# Patient Record
Sex: MALE | Race: WHITE | NOT HISPANIC OR LATINO | Employment: OTHER | URBAN - NONMETROPOLITAN AREA
[De-identification: names, ages, dates, MRNs, and addresses within clinical notes are randomized per-mention and may not be internally consistent; named-entity substitution may affect disease eponyms.]

---

## 2020-05-18 ENCOUNTER — HOSPITAL ENCOUNTER (INPATIENT)
Facility: HOSPITAL | Age: 50
LOS: 4 days | Discharge: HOME | DRG: 872 | End: 2020-05-22
Attending: EMERGENCY MEDICINE | Admitting: FAMILY MEDICINE
Payer: COMMERCIAL

## 2020-05-18 ENCOUNTER — APPOINTMENT (EMERGENCY)
Dept: RADIOLOGY | Facility: HOSPITAL | Age: 50
DRG: 872 | End: 2020-05-18
Payer: COMMERCIAL

## 2020-05-18 DIAGNOSIS — I10 ESSENTIAL HYPERTENSION: ICD-10-CM

## 2020-05-18 DIAGNOSIS — L03.116 CELLULITIS OF LEFT LOWER EXTREMITY: ICD-10-CM

## 2020-05-18 DIAGNOSIS — L03.116 CELLULITIS OF LEFT ANTERIOR LOWER LEG: Primary | ICD-10-CM

## 2020-05-18 DIAGNOSIS — N17.9 AKI (ACUTE KIDNEY INJURY) (HCC): ICD-10-CM

## 2020-05-18 DIAGNOSIS — E87.6 HYPOKALEMIA: ICD-10-CM

## 2020-05-18 PROBLEM — R93.89 ABNORMAL FINDING ON CHEST XRAY: Status: ACTIVE | Noted: 2020-05-18

## 2020-05-18 PROBLEM — R79.89 ELEVATED SERUM CREATININE: Status: ACTIVE | Noted: 2020-05-18

## 2020-05-18 PROBLEM — R50.9 FEVER IN ADULT: Status: ACTIVE | Noted: 2020-05-18

## 2020-05-18 LAB
ANION GAP SERPL CALCULATED.3IONS-SCNC: 9 MMOL/L (ref 4–13)
BASOPHILS # BLD MANUAL: 0 THOUSAND/UL (ref 0–0.1)
BASOPHILS NFR MAR MANUAL: 0 % (ref 0–1)
BUN SERPL-MCNC: 24 MG/DL (ref 5–25)
CALCIUM SERPL-MCNC: 8.6 MG/DL (ref 8.3–10.1)
CHLORIDE SERPL-SCNC: 100 MMOL/L (ref 100–108)
CO2 SERPL-SCNC: 28 MMOL/L (ref 21–32)
CREAT SERPL-MCNC: 1.63 MG/DL (ref 0.6–1.3)
EOSINOPHIL # BLD MANUAL: 0 THOUSAND/UL (ref 0–0.4)
EOSINOPHIL NFR BLD MANUAL: 0 % (ref 0–6)
ERYTHROCYTE [DISTWIDTH] IN BLOOD BY AUTOMATED COUNT: 12.5 % (ref 11.6–15.1)
GFR SERPL CREATININE-BSD FRML MDRD: 49 ML/MIN/1.73SQ M
GLUCOSE SERPL-MCNC: 152 MG/DL (ref 65–140)
HCT VFR BLD AUTO: 41.6 % (ref 36.5–49.3)
HGB BLD-MCNC: 14.9 G/DL (ref 12–17)
LACTATE SERPL-SCNC: 1.2 MMOL/L (ref 0.5–2)
LYMPHOCYTES # BLD AUTO: 0.2 THOUSAND/UL (ref 0.6–4.47)
LYMPHOCYTES # BLD AUTO: 1 % (ref 14–44)
MCH RBC QN AUTO: 31.2 PG (ref 26.8–34.3)
MCHC RBC AUTO-ENTMCNC: 35.8 G/DL (ref 31.4–37.4)
MCV RBC AUTO: 87 FL (ref 82–98)
MONOCYTES # BLD AUTO: 0.6 THOUSAND/UL (ref 0–1.22)
MONOCYTES NFR BLD: 3 % (ref 4–12)
NEUTROPHILS # BLD MANUAL: 19.23 THOUSAND/UL (ref 1.85–7.62)
NEUTS SEG NFR BLD AUTO: 96 % (ref 43–75)
NRBC BLD AUTO-RTO: 0 /100 WBCS
PLATELET # BLD AUTO: 218 THOUSANDS/UL (ref 149–390)
PLATELET BLD QL SMEAR: ADEQUATE
PMV BLD AUTO: 9.5 FL (ref 8.9–12.7)
POTASSIUM SERPL-SCNC: 3 MMOL/L (ref 3.5–5.3)
RBC # BLD AUTO: 4.78 MILLION/UL (ref 3.88–5.62)
SARS-COV-2 RNA RESP QL NAA+PROBE: NEGATIVE
SODIUM SERPL-SCNC: 137 MMOL/L (ref 136–145)
TOTAL CELLS COUNTED SPEC: 100
WBC # BLD AUTO: 20.03 THOUSAND/UL (ref 4.31–10.16)

## 2020-05-18 PROCEDURE — 71045 X-RAY EXAM CHEST 1 VIEW: CPT

## 2020-05-18 PROCEDURE — 99284 EMERGENCY DEPT VISIT MOD MDM: CPT

## 2020-05-18 PROCEDURE — 99223 1ST HOSP IP/OBS HIGH 75: CPT | Performed by: NURSE PRACTITIONER

## 2020-05-18 PROCEDURE — 99285 EMERGENCY DEPT VISIT HI MDM: CPT | Performed by: EMERGENCY MEDICINE

## 2020-05-18 PROCEDURE — 96365 THER/PROPH/DIAG IV INF INIT: CPT

## 2020-05-18 PROCEDURE — 36415 COLL VENOUS BLD VENIPUNCTURE: CPT | Performed by: EMERGENCY MEDICINE

## 2020-05-18 PROCEDURE — 83605 ASSAY OF LACTIC ACID: CPT | Performed by: EMERGENCY MEDICINE

## 2020-05-18 PROCEDURE — 85027 COMPLETE CBC AUTOMATED: CPT | Performed by: EMERGENCY MEDICINE

## 2020-05-18 PROCEDURE — 87635 SARS-COV-2 COVID-19 AMP PRB: CPT | Performed by: EMERGENCY MEDICINE

## 2020-05-18 PROCEDURE — 85007 BL SMEAR W/DIFF WBC COUNT: CPT | Performed by: EMERGENCY MEDICINE

## 2020-05-18 PROCEDURE — 96375 TX/PRO/DX INJ NEW DRUG ADDON: CPT

## 2020-05-18 PROCEDURE — 80048 BASIC METABOLIC PNL TOTAL CA: CPT | Performed by: EMERGENCY MEDICINE

## 2020-05-18 RX ORDER — POTASSIUM CHLORIDE 20 MEQ/1
40 TABLET, EXTENDED RELEASE ORAL ONCE
Status: COMPLETED | OUTPATIENT
Start: 2020-05-18 | End: 2020-05-18

## 2020-05-18 RX ORDER — HYDROCHLOROTHIAZIDE 50 MG/1
50 TABLET ORAL DAILY
COMMUNITY

## 2020-05-18 RX ORDER — KETOROLAC TROMETHAMINE 30 MG/ML
15 INJECTION, SOLUTION INTRAMUSCULAR; INTRAVENOUS ONCE
Status: COMPLETED | OUTPATIENT
Start: 2020-05-18 | End: 2020-05-18

## 2020-05-18 RX ORDER — CEFTRIAXONE 1 G/50ML
1000 INJECTION, SOLUTION INTRAVENOUS ONCE
Status: COMPLETED | OUTPATIENT
Start: 2020-05-18 | End: 2020-05-18

## 2020-05-18 RX ORDER — METOPROLOL TARTRATE 5 MG/5ML
5 INJECTION INTRAVENOUS ONCE
Status: DISCONTINUED | OUTPATIENT
Start: 2020-05-18 | End: 2020-05-22 | Stop reason: HOSPADM

## 2020-05-18 RX ORDER — ACETAMINOPHEN 325 MG/1
650 TABLET ORAL ONCE
Status: COMPLETED | OUTPATIENT
Start: 2020-05-18 | End: 2020-05-18

## 2020-05-18 RX ADMIN — POTASSIUM CHLORIDE 40 MEQ: 1500 TABLET, EXTENDED RELEASE ORAL at 21:22

## 2020-05-18 RX ADMIN — ACETAMINOPHEN 650 MG: 325 TABLET ORAL at 20:27

## 2020-05-18 RX ADMIN — SODIUM CHLORIDE 1000 ML: 0.9 INJECTION, SOLUTION INTRAVENOUS at 21:24

## 2020-05-18 RX ADMIN — CEFTRIAXONE 1000 MG: 1 INJECTION, SOLUTION INTRAVENOUS at 20:34

## 2020-05-18 RX ADMIN — KETOROLAC TROMETHAMINE 15 MG: 30 INJECTION, SOLUTION INTRAMUSCULAR at 20:34

## 2020-05-18 RX ADMIN — VANCOMYCIN HYDROCHLORIDE 1250 MG: 5 INJECTION, POWDER, LYOPHILIZED, FOR SOLUTION INTRAVENOUS at 22:14

## 2020-05-19 LAB
ANION GAP SERPL CALCULATED.3IONS-SCNC: 9 MMOL/L (ref 4–13)
BUN SERPL-MCNC: 25 MG/DL (ref 5–25)
CALCIUM SERPL-MCNC: 8 MG/DL (ref 8.3–10.1)
CHLORIDE SERPL-SCNC: 100 MMOL/L (ref 100–108)
CO2 SERPL-SCNC: 27 MMOL/L (ref 21–32)
CREAT SERPL-MCNC: 1.25 MG/DL (ref 0.6–1.3)
ERYTHROCYTE [DISTWIDTH] IN BLOOD BY AUTOMATED COUNT: 12.8 % (ref 11.6–15.1)
GFR SERPL CREATININE-BSD FRML MDRD: 68 ML/MIN/1.73SQ M
GLUCOSE SERPL-MCNC: 115 MG/DL (ref 65–140)
HCT VFR BLD AUTO: 39.9 % (ref 36.5–49.3)
HGB BLD-MCNC: 14 G/DL (ref 12–17)
MCH RBC QN AUTO: 31 PG (ref 26.8–34.3)
MCHC RBC AUTO-ENTMCNC: 35.1 G/DL (ref 31.4–37.4)
MCV RBC AUTO: 88 FL (ref 82–98)
NRBC BLD AUTO-RTO: 0 /100 WBCS
PLATELET # BLD AUTO: 206 THOUSANDS/UL (ref 149–390)
PMV BLD AUTO: 9.7 FL (ref 8.9–12.7)
POTASSIUM SERPL-SCNC: 3.3 MMOL/L (ref 3.5–5.3)
PROCALCITONIN SERPL-MCNC: 1.9 NG/ML
RBC # BLD AUTO: 4.52 MILLION/UL (ref 3.88–5.62)
SODIUM SERPL-SCNC: 136 MMOL/L (ref 136–145)
WBC # BLD AUTO: 14.96 THOUSAND/UL (ref 4.31–10.16)

## 2020-05-19 PROCEDURE — 99232 SBSQ HOSP IP/OBS MODERATE 35: CPT | Performed by: FAMILY MEDICINE

## 2020-05-19 PROCEDURE — 80048 BASIC METABOLIC PNL TOTAL CA: CPT | Performed by: NURSE PRACTITIONER

## 2020-05-19 PROCEDURE — 84145 PROCALCITONIN (PCT): CPT | Performed by: NURSE PRACTITIONER

## 2020-05-19 PROCEDURE — 85027 COMPLETE CBC AUTOMATED: CPT | Performed by: NURSE PRACTITIONER

## 2020-05-19 RX ORDER — HEPARIN SODIUM 5000 [USP'U]/ML
5000 INJECTION, SOLUTION INTRAVENOUS; SUBCUTANEOUS EVERY 8 HOURS SCHEDULED
Status: DISCONTINUED | OUTPATIENT
Start: 2020-05-19 | End: 2020-05-22 | Stop reason: HOSPADM

## 2020-05-19 RX ORDER — CYCLOBENZAPRINE HCL 10 MG
10 TABLET ORAL ONCE
Status: COMPLETED | OUTPATIENT
Start: 2020-05-19 | End: 2020-05-19

## 2020-05-19 RX ORDER — POTASSIUM CHLORIDE 20 MEQ/1
40 TABLET, EXTENDED RELEASE ORAL ONCE
Status: COMPLETED | OUTPATIENT
Start: 2020-05-19 | End: 2020-05-19

## 2020-05-19 RX ORDER — ONDANSETRON 2 MG/ML
4 INJECTION INTRAMUSCULAR; INTRAVENOUS EVERY 6 HOURS PRN
Status: DISCONTINUED | OUTPATIENT
Start: 2020-05-19 | End: 2020-05-22 | Stop reason: HOSPADM

## 2020-05-19 RX ORDER — HYDROCHLOROTHIAZIDE 25 MG/1
50 TABLET ORAL DAILY
Status: DISCONTINUED | OUTPATIENT
Start: 2020-05-19 | End: 2020-05-22 | Stop reason: HOSPADM

## 2020-05-19 RX ORDER — ACETAMINOPHEN 325 MG/1
650 TABLET ORAL EVERY 6 HOURS PRN
Status: DISCONTINUED | OUTPATIENT
Start: 2020-05-19 | End: 2020-05-22 | Stop reason: HOSPADM

## 2020-05-19 RX ORDER — CEFAZOLIN SODIUM 1 G/50ML
1000 SOLUTION INTRAVENOUS EVERY 8 HOURS
Status: DISCONTINUED | OUTPATIENT
Start: 2020-05-19 | End: 2020-05-20

## 2020-05-19 RX ADMIN — ACETAMINOPHEN 650 MG: 325 TABLET ORAL at 08:38

## 2020-05-19 RX ADMIN — HYDROCHLOROTHIAZIDE 50 MG: 25 TABLET ORAL at 08:31

## 2020-05-19 RX ADMIN — ONDANSETRON 4 MG: 2 INJECTION INTRAMUSCULAR; INTRAVENOUS at 08:38

## 2020-05-19 RX ADMIN — CYCLOBENZAPRINE HYDROCHLORIDE 10 MG: 10 TABLET, FILM COATED ORAL at 19:47

## 2020-05-19 RX ADMIN — CEFAZOLIN SODIUM 1000 MG: 1 SOLUTION INTRAVENOUS at 15:39

## 2020-05-19 RX ADMIN — ACETAMINOPHEN 650 MG: 325 TABLET ORAL at 15:39

## 2020-05-19 RX ADMIN — HEPARIN SODIUM 5000 UNITS: 5000 INJECTION INTRAVENOUS; SUBCUTANEOUS at 21:45

## 2020-05-19 RX ADMIN — POTASSIUM CHLORIDE 40 MEQ: 1500 TABLET, EXTENDED RELEASE ORAL at 11:08

## 2020-05-19 RX ADMIN — HEPARIN SODIUM 5000 UNITS: 5000 INJECTION INTRAVENOUS; SUBCUTANEOUS at 01:46

## 2020-05-19 RX ADMIN — CEFAZOLIN SODIUM 1000 MG: 1 SOLUTION INTRAVENOUS at 08:31

## 2020-05-19 RX ADMIN — HEPARIN SODIUM 5000 UNITS: 5000 INJECTION INTRAVENOUS; SUBCUTANEOUS at 15:39

## 2020-05-20 LAB
ANION GAP SERPL CALCULATED.3IONS-SCNC: 6 MMOL/L (ref 4–13)
BUN SERPL-MCNC: 15 MG/DL (ref 5–25)
CALCIUM SERPL-MCNC: 8.9 MG/DL (ref 8.3–10.1)
CHLORIDE SERPL-SCNC: 99 MMOL/L (ref 100–108)
CK SERPL-CCNC: 83 U/L (ref 39–308)
CO2 SERPL-SCNC: 30 MMOL/L (ref 21–32)
CREAT SERPL-MCNC: 1.17 MG/DL (ref 0.6–1.3)
ERYTHROCYTE [DISTWIDTH] IN BLOOD BY AUTOMATED COUNT: 12.9 % (ref 11.6–15.1)
GFR SERPL CREATININE-BSD FRML MDRD: 74 ML/MIN/1.73SQ M
GLUCOSE SERPL-MCNC: 111 MG/DL (ref 65–140)
HCT VFR BLD AUTO: 40.8 % (ref 36.5–49.3)
HGB BLD-MCNC: 14.4 G/DL (ref 12–17)
MCH RBC QN AUTO: 31.1 PG (ref 26.8–34.3)
MCHC RBC AUTO-ENTMCNC: 35.3 G/DL (ref 31.4–37.4)
MCV RBC AUTO: 88 FL (ref 82–98)
PLATELET # BLD AUTO: 189 THOUSANDS/UL (ref 149–390)
PMV BLD AUTO: 9.5 FL (ref 8.9–12.7)
POTASSIUM SERPL-SCNC: 3.5 MMOL/L (ref 3.5–5.3)
PROCALCITONIN SERPL-MCNC: 1.12 NG/ML
RBC # BLD AUTO: 4.63 MILLION/UL (ref 3.88–5.62)
SODIUM SERPL-SCNC: 135 MMOL/L (ref 136–145)
WBC # BLD AUTO: 11.88 THOUSAND/UL (ref 4.31–10.16)

## 2020-05-20 PROCEDURE — 84145 PROCALCITONIN (PCT): CPT | Performed by: NURSE PRACTITIONER

## 2020-05-20 PROCEDURE — 80048 BASIC METABOLIC PNL TOTAL CA: CPT | Performed by: FAMILY MEDICINE

## 2020-05-20 PROCEDURE — 85027 COMPLETE CBC AUTOMATED: CPT | Performed by: FAMILY MEDICINE

## 2020-05-20 PROCEDURE — 82550 ASSAY OF CK (CPK): CPT | Performed by: FAMILY MEDICINE

## 2020-05-20 PROCEDURE — 99232 SBSQ HOSP IP/OBS MODERATE 35: CPT | Performed by: FAMILY MEDICINE

## 2020-05-20 RX ORDER — CEFAZOLIN SODIUM 2 G/50ML
2000 SOLUTION INTRAVENOUS EVERY 8 HOURS
Status: DISCONTINUED | OUTPATIENT
Start: 2020-05-20 | End: 2020-05-22 | Stop reason: HOSPADM

## 2020-05-20 RX ADMIN — HEPARIN SODIUM 5000 UNITS: 5000 INJECTION INTRAVENOUS; SUBCUTANEOUS at 21:32

## 2020-05-20 RX ADMIN — VANCOMYCIN HYDROCHLORIDE 1250 MG: 5 INJECTION, POWDER, LYOPHILIZED, FOR SOLUTION INTRAVENOUS at 11:40

## 2020-05-20 RX ADMIN — ACETAMINOPHEN 650 MG: 325 TABLET ORAL at 00:09

## 2020-05-20 RX ADMIN — VANCOMYCIN HYDROCHLORIDE 1250 MG: 5 INJECTION, POWDER, LYOPHILIZED, FOR SOLUTION INTRAVENOUS at 21:32

## 2020-05-20 RX ADMIN — ACETAMINOPHEN 650 MG: 325 TABLET ORAL at 20:41

## 2020-05-20 RX ADMIN — HYDROCHLOROTHIAZIDE 50 MG: 25 TABLET ORAL at 08:04

## 2020-05-20 RX ADMIN — CEFAZOLIN SODIUM 1000 MG: 1 SOLUTION INTRAVENOUS at 00:06

## 2020-05-20 RX ADMIN — CEFAZOLIN SODIUM 2000 MG: 2 SOLUTION INTRAVENOUS at 23:52

## 2020-05-20 RX ADMIN — CEFAZOLIN SODIUM 2000 MG: 2 SOLUTION INTRAVENOUS at 15:32

## 2020-05-20 RX ADMIN — HEPARIN SODIUM 5000 UNITS: 5000 INJECTION INTRAVENOUS; SUBCUTANEOUS at 13:12

## 2020-05-20 RX ADMIN — CEFAZOLIN SODIUM 1000 MG: 1 SOLUTION INTRAVENOUS at 08:04

## 2020-05-20 RX ADMIN — ACETAMINOPHEN 650 MG: 325 TABLET ORAL at 07:23

## 2020-05-20 RX ADMIN — HEPARIN SODIUM 5000 UNITS: 5000 INJECTION INTRAVENOUS; SUBCUTANEOUS at 06:21

## 2020-05-21 ENCOUNTER — APPOINTMENT (INPATIENT)
Dept: NON INVASIVE DIAGNOSTICS | Facility: HOSPITAL | Age: 50
DRG: 872 | End: 2020-05-21
Payer: COMMERCIAL

## 2020-05-21 LAB
ALBUMIN SERPL BCP-MCNC: 3.3 G/DL (ref 3.5–5)
ALP SERPL-CCNC: 57 U/L (ref 46–116)
ALT SERPL W P-5'-P-CCNC: 28 U/L (ref 12–78)
ANION GAP SERPL CALCULATED.3IONS-SCNC: 7 MMOL/L (ref 4–13)
AST SERPL W P-5'-P-CCNC: 21 U/L (ref 5–45)
BILIRUB SERPL-MCNC: 0.85 MG/DL (ref 0.2–1)
BUN SERPL-MCNC: 15 MG/DL (ref 5–25)
CALCIUM SERPL-MCNC: 9 MG/DL (ref 8.3–10.1)
CHLORIDE SERPL-SCNC: 100 MMOL/L (ref 100–108)
CO2 SERPL-SCNC: 30 MMOL/L (ref 21–32)
CREAT SERPL-MCNC: 1.05 MG/DL (ref 0.6–1.3)
ERYTHROCYTE [DISTWIDTH] IN BLOOD BY AUTOMATED COUNT: 12.8 % (ref 11.6–15.1)
GFR SERPL CREATININE-BSD FRML MDRD: 84 ML/MIN/1.73SQ M
GLUCOSE SERPL-MCNC: 96 MG/DL (ref 65–140)
HCT VFR BLD AUTO: 41.9 % (ref 36.5–49.3)
HGB BLD-MCNC: 14.5 G/DL (ref 12–17)
MCH RBC QN AUTO: 30.6 PG (ref 26.8–34.3)
MCHC RBC AUTO-ENTMCNC: 34.6 G/DL (ref 31.4–37.4)
MCV RBC AUTO: 88 FL (ref 82–98)
PLATELET # BLD AUTO: 196 THOUSANDS/UL (ref 149–390)
PMV BLD AUTO: 9.3 FL (ref 8.9–12.7)
POTASSIUM SERPL-SCNC: 3.2 MMOL/L (ref 3.5–5.3)
PROCALCITONIN SERPL-MCNC: 0.62 NG/ML
PROT SERPL-MCNC: 7.2 G/DL (ref 6.4–8.2)
RBC # BLD AUTO: 4.74 MILLION/UL (ref 3.88–5.62)
SODIUM SERPL-SCNC: 137 MMOL/L (ref 136–145)
WBC # BLD AUTO: 9.28 THOUSAND/UL (ref 4.31–10.16)

## 2020-05-21 PROCEDURE — 93971 EXTREMITY STUDY: CPT | Performed by: SURGERY

## 2020-05-21 PROCEDURE — 85027 COMPLETE CBC AUTOMATED: CPT | Performed by: FAMILY MEDICINE

## 2020-05-21 PROCEDURE — 93971 EXTREMITY STUDY: CPT

## 2020-05-21 PROCEDURE — 84145 PROCALCITONIN (PCT): CPT | Performed by: FAMILY MEDICINE

## 2020-05-21 PROCEDURE — 80053 COMPREHEN METABOLIC PANEL: CPT | Performed by: FAMILY MEDICINE

## 2020-05-21 PROCEDURE — 99232 SBSQ HOSP IP/OBS MODERATE 35: CPT | Performed by: FAMILY MEDICINE

## 2020-05-21 RX ORDER — POTASSIUM CHLORIDE 20 MEQ/1
40 TABLET, EXTENDED RELEASE ORAL ONCE
Status: COMPLETED | OUTPATIENT
Start: 2020-05-21 | End: 2020-05-21

## 2020-05-21 RX ADMIN — POTASSIUM CHLORIDE 40 MEQ: 1500 TABLET, EXTENDED RELEASE ORAL at 09:53

## 2020-05-21 RX ADMIN — CEFAZOLIN SODIUM 2000 MG: 2 SOLUTION INTRAVENOUS at 08:36

## 2020-05-21 RX ADMIN — CEFAZOLIN SODIUM 2000 MG: 2 SOLUTION INTRAVENOUS at 23:36

## 2020-05-21 RX ADMIN — HEPARIN SODIUM 5000 UNITS: 5000 INJECTION INTRAVENOUS; SUBCUTANEOUS at 05:49

## 2020-05-21 RX ADMIN — HYDROCHLOROTHIAZIDE 50 MG: 25 TABLET ORAL at 08:35

## 2020-05-21 RX ADMIN — ACETAMINOPHEN 650 MG: 325 TABLET ORAL at 11:53

## 2020-05-21 RX ADMIN — CEFAZOLIN SODIUM 2000 MG: 2 SOLUTION INTRAVENOUS at 16:14

## 2020-05-21 RX ADMIN — HEPARIN SODIUM 5000 UNITS: 5000 INJECTION INTRAVENOUS; SUBCUTANEOUS at 21:45

## 2020-05-21 RX ADMIN — VANCOMYCIN HYDROCHLORIDE 1250 MG: 5 INJECTION, POWDER, LYOPHILIZED, FOR SOLUTION INTRAVENOUS at 21:45

## 2020-05-21 RX ADMIN — HEPARIN SODIUM 5000 UNITS: 5000 INJECTION INTRAVENOUS; SUBCUTANEOUS at 13:53

## 2020-05-21 RX ADMIN — VANCOMYCIN HYDROCHLORIDE 1250 MG: 5 INJECTION, POWDER, LYOPHILIZED, FOR SOLUTION INTRAVENOUS at 10:25

## 2020-05-22 VITALS
BODY MASS INDEX: 27.3 KG/M2 | OXYGEN SATURATION: 95 % | HEIGHT: 71 IN | DIASTOLIC BLOOD PRESSURE: 85 MMHG | RESPIRATION RATE: 19 BRPM | HEART RATE: 77 BPM | SYSTOLIC BLOOD PRESSURE: 126 MMHG | TEMPERATURE: 99.1 F | WEIGHT: 195 LBS

## 2020-05-22 PROBLEM — R93.89 ABNORMAL FINDING ON CHEST XRAY: Status: RESOLVED | Noted: 2020-05-18 | Resolved: 2020-05-22

## 2020-05-22 PROBLEM — E87.6 HYPOKALEMIA: Status: RESOLVED | Noted: 2020-05-18 | Resolved: 2020-05-22

## 2020-05-22 PROBLEM — N17.9 ACUTE KIDNEY INJURY (HCC): Status: RESOLVED | Noted: 2020-05-18 | Resolved: 2020-05-22

## 2020-05-22 LAB
ANION GAP SERPL CALCULATED.3IONS-SCNC: 8 MMOL/L (ref 4–13)
BUN SERPL-MCNC: 17 MG/DL (ref 5–25)
CALCIUM SERPL-MCNC: 9.1 MG/DL (ref 8.3–10.1)
CHLORIDE SERPL-SCNC: 100 MMOL/L (ref 100–108)
CO2 SERPL-SCNC: 30 MMOL/L (ref 21–32)
CREAT SERPL-MCNC: 1.03 MG/DL (ref 0.6–1.3)
ERYTHROCYTE [DISTWIDTH] IN BLOOD BY AUTOMATED COUNT: 12.6 % (ref 11.6–15.1)
GFR SERPL CREATININE-BSD FRML MDRD: 86 ML/MIN/1.73SQ M
GLUCOSE SERPL-MCNC: 99 MG/DL (ref 65–140)
HCT VFR BLD AUTO: 41.7 % (ref 36.5–49.3)
HGB BLD-MCNC: 14.6 G/DL (ref 12–17)
MCH RBC QN AUTO: 30.7 PG (ref 26.8–34.3)
MCHC RBC AUTO-ENTMCNC: 35 G/DL (ref 31.4–37.4)
MCV RBC AUTO: 88 FL (ref 82–98)
PLATELET # BLD AUTO: 217 THOUSANDS/UL (ref 149–390)
PMV BLD AUTO: 9.1 FL (ref 8.9–12.7)
POTASSIUM SERPL-SCNC: 3.6 MMOL/L (ref 3.5–5.3)
PROCALCITONIN SERPL-MCNC: 0.35 NG/ML
RBC # BLD AUTO: 4.76 MILLION/UL (ref 3.88–5.62)
SODIUM SERPL-SCNC: 138 MMOL/L (ref 136–145)
WBC # BLD AUTO: 9.21 THOUSAND/UL (ref 4.31–10.16)

## 2020-05-22 PROCEDURE — 85027 COMPLETE CBC AUTOMATED: CPT | Performed by: FAMILY MEDICINE

## 2020-05-22 PROCEDURE — 80048 BASIC METABOLIC PNL TOTAL CA: CPT | Performed by: FAMILY MEDICINE

## 2020-05-22 PROCEDURE — 84145 PROCALCITONIN (PCT): CPT | Performed by: FAMILY MEDICINE

## 2020-05-22 PROCEDURE — 99239 HOSP IP/OBS DSCHRG MGMT >30: CPT | Performed by: FAMILY MEDICINE

## 2020-05-22 RX ORDER — CEFUROXIME AXETIL 500 MG/1
500 TABLET ORAL EVERY 12 HOURS SCHEDULED
Qty: 12 TABLET | Refills: 0 | Status: SHIPPED | OUTPATIENT
Start: 2020-05-22 | End: 2020-05-28

## 2020-05-22 RX ORDER — DOXYCYCLINE HYCLATE 100 MG/1
100 CAPSULE ORAL EVERY 12 HOURS SCHEDULED
Status: DISCONTINUED | OUTPATIENT
Start: 2020-05-22 | End: 2020-05-22 | Stop reason: HOSPADM

## 2020-05-22 RX ORDER — DOXYCYCLINE HYCLATE 100 MG/1
100 CAPSULE ORAL EVERY 12 HOURS SCHEDULED
Qty: 12 CAPSULE | Refills: 0 | Status: SHIPPED | OUTPATIENT
Start: 2020-05-22 | End: 2020-05-28

## 2020-05-22 RX ADMIN — HEPARIN SODIUM 5000 UNITS: 5000 INJECTION INTRAVENOUS; SUBCUTANEOUS at 05:10

## 2020-05-22 RX ADMIN — DOXYCYCLINE 100 MG: 100 CAPSULE ORAL at 10:19

## 2020-05-22 RX ADMIN — HYDROCHLOROTHIAZIDE 50 MG: 25 TABLET ORAL at 08:10

## 2020-05-22 RX ADMIN — CEFAZOLIN SODIUM 2000 MG: 2 SOLUTION INTRAVENOUS at 08:10

## 2021-03-10 DIAGNOSIS — Z23 ENCOUNTER FOR IMMUNIZATION: ICD-10-CM

## 2021-08-31 DIAGNOSIS — M54.50 LOW BACK PAIN: ICD-10-CM

## 2021-08-31 DIAGNOSIS — G89.29 OTHER CHRONIC PAIN: ICD-10-CM

## 2021-08-31 DIAGNOSIS — R29.898 OTHER SYMPTOMS AND SIGNS INVOLVING THE MUSCULOSKELETAL SYSTEM: ICD-10-CM

## 2021-11-19 ENCOUNTER — TELEPHONE (OUTPATIENT)
Dept: PAIN MEDICINE | Facility: CLINIC | Age: 51
End: 2021-11-19

## 2021-11-19 ENCOUNTER — CONSULT (OUTPATIENT)
Dept: PAIN MEDICINE | Facility: CLINIC | Age: 51
End: 2021-11-19
Payer: COMMERCIAL

## 2021-11-19 ENCOUNTER — HOSPITAL ENCOUNTER (OUTPATIENT)
Dept: RADIOLOGY | Facility: CLINIC | Age: 51
Discharge: HOME | End: 2021-11-19
Payer: COMMERCIAL

## 2021-11-19 VITALS
TEMPERATURE: 97.8 F | HEIGHT: 71 IN | HEART RATE: 59 BPM | SYSTOLIC BLOOD PRESSURE: 160 MMHG | WEIGHT: 206.6 LBS | DIASTOLIC BLOOD PRESSURE: 100 MMHG | BODY MASS INDEX: 28.92 KG/M2

## 2021-11-19 DIAGNOSIS — M54.16 LUMBAR RADICULOPATHY: ICD-10-CM

## 2021-11-19 DIAGNOSIS — M54.2 NECK PAIN: ICD-10-CM

## 2021-11-19 DIAGNOSIS — M54.16 LUMBAR RADICULOPATHY: Primary | ICD-10-CM

## 2021-11-19 DIAGNOSIS — M53.3 SACROILIAC JOINT DYSFUNCTION OF RIGHT SIDE: ICD-10-CM

## 2021-11-19 PROCEDURE — 72040 X-RAY EXAM NECK SPINE 2-3 VW: CPT

## 2021-11-19 PROCEDURE — 72100 X-RAY EXAM L-S SPINE 2/3 VWS: CPT

## 2021-11-19 PROCEDURE — 99244 OFF/OP CNSLTJ NEW/EST MOD 40: CPT | Performed by: ANESTHESIOLOGY

## 2021-11-19 RX ORDER — GABAPENTIN 300 MG/1
300 CAPSULE ORAL 3 TIMES DAILY
Qty: 90 CAPSULE | Refills: 0 | Status: SHIPPED | OUTPATIENT
Start: 2021-11-19 | End: 2021-12-20 | Stop reason: SDUPTHER

## 2021-11-19 RX ORDER — METHYLPREDNISOLONE ACETATE 80 MG/ML
80 INJECTION, SUSPENSION INTRA-ARTICULAR; INTRALESIONAL; INTRAMUSCULAR; PARENTERAL; SOFT TISSUE ONCE
Status: CANCELLED | OUTPATIENT
Start: 2021-11-19 | End: 2021-11-19

## 2021-11-19 RX ORDER — LANOLIN ALCOHOL/MO/W.PET/CERES
1 CREAM (GRAM) TOPICAL
COMMUNITY

## 2021-11-19 RX ORDER — OMEGA-3S/DHA/EPA/FISH OIL/D3 300MG-1000
400 CAPSULE ORAL DAILY
COMMUNITY

## 2021-11-19 RX ORDER — MAGNESIUM 30 MG
200 TABLET ORAL 2 TIMES DAILY
COMMUNITY

## 2021-11-19 RX ORDER — IBUPROFEN 600 MG/1
600 TABLET ORAL
COMMUNITY
End: 2021-11-19

## 2021-11-19 RX ORDER — LIDOCAINE HYDROCHLORIDE 10 MG/ML
5 INJECTION, SOLUTION EPIDURAL; INFILTRATION; INTRACAUDAL; PERINEURAL ONCE
Status: CANCELLED | OUTPATIENT
Start: 2021-11-19 | End: 2021-11-19

## 2021-11-19 RX ORDER — OMEGA-3/DHA/EPA/FISH OIL 300-1000MG
1 CAPSULE ORAL
COMMUNITY

## 2021-11-19 RX ORDER — MELOXICAM 7.5 MG/1
7.5 TABLET ORAL 2 TIMES DAILY PRN
Qty: 60 TABLET | Refills: 0 | Status: SHIPPED | OUTPATIENT
Start: 2021-11-19 | End: 2021-12-26

## 2021-11-19 RX ORDER — LOSARTAN POTASSIUM 25 MG/1
TABLET ORAL
COMMUNITY
Start: 2021-08-19 | End: 2022-02-14

## 2021-11-19 RX ORDER — SUMATRIPTAN 25 MG/1
TABLET, FILM COATED ORAL
COMMUNITY
Start: 2021-09-01

## 2021-11-19 RX ORDER — BUPIVACAINE HCL/PF 2.5 MG/ML
4 VIAL (ML) INJECTION ONCE
Status: CANCELLED | OUTPATIENT
Start: 2021-11-19 | End: 2021-11-19

## 2021-12-01 ENCOUNTER — HOSPITAL ENCOUNTER (OUTPATIENT)
Dept: MRI IMAGING | Facility: HOSPITAL | Age: 51
Discharge: HOME | End: 2021-12-01
Attending: ANESTHESIOLOGY
Payer: COMMERCIAL

## 2021-12-01 DIAGNOSIS — M54.16 LUMBAR RADICULOPATHY: ICD-10-CM

## 2021-12-01 PROCEDURE — G1004 CDSM NDSC: HCPCS

## 2021-12-01 PROCEDURE — 72148 MRI LUMBAR SPINE W/O DYE: CPT

## 2021-12-02 ENCOUNTER — APPOINTMENT (OUTPATIENT)
Dept: RADIOLOGY | Facility: HOSPITAL | Age: 51
End: 2021-12-02
Payer: COMMERCIAL

## 2021-12-02 ENCOUNTER — HOSPITAL ENCOUNTER (OUTPATIENT)
Facility: HOSPITAL | Age: 51
Setting detail: OUTPATIENT SURGERY
Discharge: HOME | End: 2021-12-02
Attending: ANESTHESIOLOGY | Admitting: ANESTHESIOLOGY
Payer: COMMERCIAL

## 2021-12-02 VITALS
SYSTOLIC BLOOD PRESSURE: 125 MMHG | OXYGEN SATURATION: 99 % | BODY MASS INDEX: 28.84 KG/M2 | WEIGHT: 206 LBS | DIASTOLIC BLOOD PRESSURE: 78 MMHG | HEART RATE: 68 BPM | TEMPERATURE: 98.1 F | RESPIRATION RATE: 18 BRPM | HEIGHT: 71 IN

## 2021-12-02 PROCEDURE — 62323 NJX INTERLAMINAR LMBR/SAC: CPT | Performed by: ANESTHESIOLOGY

## 2021-12-02 RX ORDER — LIDOCAINE HYDROCHLORIDE 10 MG/ML
INJECTION, SOLUTION EPIDURAL; INFILTRATION; INTRACAUDAL; PERINEURAL AS NEEDED
Status: DISCONTINUED | OUTPATIENT
Start: 2021-12-02 | End: 2021-12-02 | Stop reason: HOSPADM

## 2021-12-02 RX ORDER — METHYLPREDNISOLONE ACETATE 40 MG/ML
INJECTION, SUSPENSION INTRA-ARTICULAR; INTRALESIONAL; INTRAMUSCULAR; SOFT TISSUE AS NEEDED
Status: DISCONTINUED | OUTPATIENT
Start: 2021-12-02 | End: 2021-12-02 | Stop reason: HOSPADM

## 2021-12-02 RX ORDER — ALPRAZOLAM 0.5 MG/1
0.5 TABLET ORAL ONCE
Status: COMPLETED | OUTPATIENT
Start: 2021-12-02 | End: 2021-12-02

## 2021-12-02 RX ORDER — SODIUM CHLORIDE 9 MG/ML
INJECTION INTRAVENOUS AS NEEDED
Status: DISCONTINUED | OUTPATIENT
Start: 2021-12-02 | End: 2021-12-02 | Stop reason: HOSPADM

## 2021-12-02 RX ADMIN — ALPRAZOLAM 0.5 MG: 0.5 TABLET ORAL at 09:08

## 2021-12-20 ENCOUNTER — OFFICE VISIT (OUTPATIENT)
Dept: PAIN MEDICINE | Facility: CLINIC | Age: 51
End: 2021-12-20
Payer: COMMERCIAL

## 2021-12-20 VITALS
HEIGHT: 71 IN | BODY MASS INDEX: 29.46 KG/M2 | WEIGHT: 210.4 LBS | RESPIRATION RATE: 20 BRPM | TEMPERATURE: 97.4 F | HEART RATE: 72 BPM | DIASTOLIC BLOOD PRESSURE: 68 MMHG | SYSTOLIC BLOOD PRESSURE: 122 MMHG

## 2021-12-20 DIAGNOSIS — M54.16 LUMBAR RADICULOPATHY: ICD-10-CM

## 2021-12-20 DIAGNOSIS — M47.816 LUMBAR FACET ARTHROPATHY: Primary | ICD-10-CM

## 2021-12-20 PROCEDURE — 99214 OFFICE O/P EST MOD 30 MIN: CPT | Performed by: ANESTHESIOLOGY

## 2021-12-20 RX ORDER — BUPIVACAINE HCL/PF 2.5 MG/ML
10 VIAL (ML) INJECTION ONCE
Status: CANCELLED | OUTPATIENT
Start: 2021-12-20 | End: 2021-12-20

## 2021-12-20 RX ORDER — METHYLPREDNISOLONE ACETATE 80 MG/ML
80 INJECTION, SUSPENSION INTRA-ARTICULAR; INTRALESIONAL; INTRAMUSCULAR; PARENTERAL; SOFT TISSUE ONCE
Status: CANCELLED | OUTPATIENT
Start: 2021-12-20 | End: 2021-12-20

## 2021-12-20 RX ORDER — LIDOCAINE HYDROCHLORIDE 10 MG/ML
10 INJECTION, SOLUTION EPIDURAL; INFILTRATION; INTRACAUDAL; PERINEURAL ONCE
Status: CANCELLED | OUTPATIENT
Start: 2021-12-20 | End: 2021-12-20

## 2021-12-20 RX ORDER — GABAPENTIN 300 MG/1
300 CAPSULE ORAL 3 TIMES DAILY
Qty: 90 CAPSULE | Refills: 2 | Status: SHIPPED | OUTPATIENT
Start: 2021-12-20 | End: 2022-03-16 | Stop reason: SDUPTHER

## 2021-12-20 NOTE — H&P (VIEW-ONLY)
Assessment  1  Lumbar facet arthropathy  -     Nerve Stimulator (TENS Therapy Pain Relief) SASHA; Use 1 application 2 (two) times a day as needed (moderate pain)  -     Case request operating room: BLOCK MEDIAL BRANCH Bilateral L3, L4, L5 #1; Standing  -     Case request operating room: BLOCK MEDIAL BRANCH Bilateral L3, L4, L5 #1    2  Lumbar radiculopathy  -     gabapentin (NEURONTIN) 300 mg capsule; Take 1 capsule (300 mg total) by mouth 3 (three) times a day    Greater than 60% relief of radicular pain with improved ability to participate with IADLs after ILESI at L4-L5  Continues to describe axial low back pain with aching, nagging, indolent, stabbing,, and characteristics  Positive facet loading maneuvers concordant with prominent lumbar facet arthropathy seen throughout lumbar spine on MRI  Risks, benefits and alternatives to lumbar medial branch blocks and subsequent radiofrequency ablation of successful thoroughly discussed with patient  Handouts provided questions answered to patient's satisfaction  Previously reported the following symptomatology:     Right low axial back pain in the area of right sacroiliac joint  Positive right-sided sacroiliac joint loading, positive Rony fingers test, positive Gaenslen's test   Aching, nagging, indolent, stabbing, throbbing pain  Additionally describes symptomatology of leg weakness and numbness that has been progressive  The pain resembles neurogenic claudication  No pertinent imaging available to review; however, pain with positive facet loading maneuvers bilaterally  5/5 strength in all extremities with active range of motion movements, negative SLR bilaterally  He has failed more than 12 weeks of conservative therapy including physical and chiropractic therapy  Reasonable at this time to proceed with multimodal pain therapy plan while obtaining MRI lumbar spine noncontrast to guide interventional therapy      Plan  -bilateral L3, L4, L5 medial branch blocks #1; f/u 2 weeks post procedure  -gabapentin 300 mg t i d  Ordered for patient; counseled regarding sedative effects of taking this medication and provided up titration calendar  Counseled not to take medication while driving or operating heavy machinery/using stairs  -DME TENS  -Meloxicam 7 5 mg b i d  prn pain Prescribed  Patient educated regarding bleeding risk of taking this medication not taking any other nonsteroidal anti-inflammatory medications while taking this medication; counseled thoroughly regarding potential risk of Cardiovascular injury, Kidney injury, Gastrointestinal ulceration/bleeding  Patient voiced understanding  -physical therapy for right-sided lumbar radiculopathy; Core exercises additionally provided for physician directed home PT that the patient plans to participate with for 1 hour, twice a week for the next 6 weeks  There are risks associated with opioid medications, including dependence, addiction and tolerance  The patient understands and agrees to use these medications only as prescribed  Potential side effects of the medications include, but are not limited to, constipation, drowsiness, addiction, impaired judgment and risk of fatal overdose if not taken as prescribed  The patient was warned against driving while taking sedation medications  Sharing medications is a felony  At this point in time, the patient is showing no signs of addiction, abuse, diversion or suicidal ideation  [place] Prescription Drug Monitoring Program report was reviewed and was appropriate     Complete risks and benefits including bleeding, infection, tissue reaction, nerve injury and allergic reaction were discussed  The approach was demonstrated using models and literature was provided  Verbal and written consent was obtained  My impressions and treatment recommendations were discussed in detail with the patient who verbalized understanding and had no further questions  Discharge instructions were provided  I personally saw and examined the patient and I agree with the above discussed plan of care  New Medications Ordered This Visit   Medications    Nerve Stimulator (TENS Therapy Pain Relief) SASHA     Sig: Use 1 application 2 (two) times a day as needed (moderate pain)     Dispense:  1 each     Refill:  0    gabapentin (NEURONTIN) 300 mg capsule     Sig: Take 1 capsule (300 mg total) by mouth 3 (three) times a day     Dispense:  90 capsule     Refill:  2       History of Present Illness    Greater than 60% relief of radicular pain with improved ability to participate with IADLs after ILESI at L4-L5  Continues to describe axial low back pain with aching, nagging, indolent, stabbing,, and characteristics  Positive facet loading maneuvers concordant with prominent lumbar facet arthropathy seen throughout lumbar spine on MRI  Risks, benefits and alternatives to lumbar medial branch blocks and subsequent radiofrequency ablation of successful thoroughly discussed with patient  Handouts provided questions answered to patient's satisfaction  Previously reported the following symptomatology:     Angeline Flores is a 54 y o  male with pmhx of migraines, HTN presenting with right low axial back pain described primarily arthritic features  He describes 8/10 right low back pain that is worse in the mornings and worse at the end of the day  The pain is characterized by achy, nagging, indolent, crampy, stabbing pain in his axial right low back in the region of the sacroiliac joint  The patient describes that the pain is worse with standing for long periods of time on hard surfaces as well as with walking  The patient is a very active individual and feels as though this pain compromises his  participation with independent activities of daily living   The pain can be debilitating at times and contribute to significant disability, compromising overall activity and independent activities of daily living  He has tried chiropractic and physical therapy with limited relief of symptoms  Medications the patient has tried in the past include ibuprofen with limited relief  He describes   radicular symptoms in the bilateral L4 and L5 dermatomes but otherwise has good strength  He endorses weakness numbness or paresthesias  The patient denies any bowel or bladder dysfunction as well  The patient works as a  and states that he has significant debilitation with independent activities of daily living and overall function  Additionally describes neck pain with arthritic features; pain with lateral rotation/extesion flexion  Chiropractic and PT did help with this pain in the past as have nsaids  I have personally reviewed and/or updated the patient's past medical history, past surgical history, family history, social history, current medications, allergies, and vital signs today  Review of Systems   Constitutional: Positive for activity change  HENT: Negative  Eyes: Negative  Respiratory: Negative  Cardiovascular: Negative  Gastrointestinal: Negative  Endocrine: Negative  Genitourinary: Negative  Musculoskeletal: Positive for arthralgias, back pain, gait problem and myalgias  Skin: Negative  Allergic/Immunologic: Negative  Neurological: Positive for weakness and numbness  Hematological: Negative  Psychiatric/Behavioral: Negative  All other systems reviewed and are negative        Patient Active Problem List   Diagnosis    Essential hypertension    Cellulitis of left lower extremity    Fever in adult       Past Medical History:   Diagnosis Date    Hypertension     Migraines        Past Surgical History:   Procedure Laterality Date   Willene Mocha JOINT Right 12/2/2021    Procedure: Interlaminar epidural steroid injection at L4-L5 ;  Surgeon: Wyatt Nielson MD;  [facility];  Service: Pain Management     SHOULDER SURGERY Left        Family History   Problem Relation Age of Onset    Diabetes Mother     Migraines Mother     Hypertension Father        Social History     Occupational History    Not on file   Tobacco Use    Smoking status: Former Smoker     Packs/day: 1 00     Years: 10 00     Pack years: 10      Types: Cigarettes     Quit date: 2000     Years since quittin 6    Smokeless tobacco: Never Used   Vaping Use    Vaping Use: Never used   Substance and Sexual Activity    Alcohol use: Not Currently    Drug use: Yes     Types: Marijuana     Comment: Occasional marijuana    Sexual activity: Not on file       Current Outpatient Medications on File Prior to Visit   Medication Sig    cholecalciferol (VITAMIN D3) 400 units tablet Take 400 Units by mouth daily    Diclofenac Sodium (VOLTAREN) 1 % Apply to affected area twice a day as needed for pain    fish oil-omega-3 fatty acids 1000 MG capsule Take 1 capsule by mouth    glucosamine-chondroitin 500-400 MG tablet Take 1 tablet by mouth    hydrochlorothiazide (HYDRODIURIL) 50 mg tablet Take 50 mg by mouth daily    losartan (COZAAR) 25 mg tablet     magnesium 30 MG tablet Take 200 mg by mouth 2 (two) times a day    meloxicam (MOBIC) 7 5 mg tablet Take 1 tablet (7 5 mg total) by mouth 2 (two) times a day as needed for moderate pain    SUMAtriptan (IMITREX) 25 mg tablet     [DISCONTINUED] gabapentin (NEURONTIN) 300 mg capsule Take 1 capsule (300 mg total) by mouth 3 (three) times a day     No current facility-administered medications on file prior to visit  Allergies   Allergen Reactions    Penicillins Hives     Hives           Physical Exam    /68   Pulse 72   Temp (!) 97 4 °F (36 3 °C)   Resp 20   Ht 5' 11" (1 803 m)   Wt 95 4 kg (210 lb 6 4 oz)   BMI 29 34 kg/m²     Constitutional: normal, well developed, well nourished, alert, in no distress and non-toxic and no overt pain behavior   and overweight  Eyes: anicteric  HEENT: grossly intact  Neck: supple, symmetric, trachea midline and no masses   Pulmonary:even and unlabored  Cardiovascular:No edema or pitting edema present  Skin:Normal without rashes or lesions and well hydrated  Psychiatric:Mood and affect appropriate  Neurologic:Cranial Nerves II-XII grossly intact Sensation grossly intact; no clonus negative rodas's  Reflexes 2+ and brisk  SLR negative bilaterally  Musculoskeletal:normal gait  5/5 strength in all extremities with active range of motion movements bilaterally  Normal heel toe and tip toe walking  pain with positive facet loading maneuvers bilaterally and with lateral spine rotation  No ttp over lumbar paraspinal muscles  Positive right-sided sacroiliac joint loading, positive Rony fingers test, positive Gaenslen's test       Imaging    No pertinent imaging to review at this time

## 2021-12-26 DIAGNOSIS — M54.16 LUMBAR RADICULOPATHY: ICD-10-CM

## 2021-12-26 RX ORDER — MELOXICAM 7.5 MG/1
7.5 TABLET ORAL 2 TIMES DAILY PRN
Qty: 60 TABLET | Refills: 0 | Status: SHIPPED | OUTPATIENT
Start: 2021-12-26 | End: 2022-03-16 | Stop reason: SDUPTHER

## 2021-12-28 ENCOUNTER — HOSPITAL ENCOUNTER (OUTPATIENT)
Facility: HOSPITAL | Age: 51
Setting detail: OUTPATIENT SURGERY
Discharge: HOME | End: 2021-12-28
Attending: ANESTHESIOLOGY | Admitting: ANESTHESIOLOGY
Payer: COMMERCIAL

## 2021-12-28 ENCOUNTER — APPOINTMENT (OUTPATIENT)
Dept: RADIOLOGY | Facility: HOSPITAL | Age: 51
End: 2021-12-28
Payer: COMMERCIAL

## 2021-12-28 VITALS
HEART RATE: 61 BPM | BODY MASS INDEX: 29.4 KG/M2 | RESPIRATION RATE: 20 BRPM | TEMPERATURE: 98.3 F | DIASTOLIC BLOOD PRESSURE: 89 MMHG | SYSTOLIC BLOOD PRESSURE: 154 MMHG | HEIGHT: 71 IN | WEIGHT: 210 LBS | OXYGEN SATURATION: 98 %

## 2021-12-28 PROCEDURE — 64494 INJ PARAVERT F JNT L/S 2 LEV: CPT | Performed by: ANESTHESIOLOGY

## 2021-12-28 PROCEDURE — 64493 INJ PARAVERT F JNT L/S 1 LEV: CPT | Performed by: ANESTHESIOLOGY

## 2021-12-28 RX ORDER — ALPRAZOLAM 0.5 MG/1
0.5 TABLET ORAL ONCE
Status: COMPLETED | OUTPATIENT
Start: 2021-12-28 | End: 2021-12-28

## 2021-12-28 RX ORDER — METHYLPREDNISOLONE ACETATE 80 MG/ML
INJECTION, SUSPENSION INTRA-ARTICULAR; INTRALESIONAL; INTRAMUSCULAR; SOFT TISSUE AS NEEDED
Status: DISCONTINUED | OUTPATIENT
Start: 2021-12-28 | End: 2021-12-28 | Stop reason: HOSPADM

## 2021-12-28 RX ORDER — BUPIVACAINE HYDROCHLORIDE 2.5 MG/ML
INJECTION, SOLUTION EPIDURAL; INFILTRATION; INTRACAUDAL AS NEEDED
Status: DISCONTINUED | OUTPATIENT
Start: 2021-12-28 | End: 2021-12-28 | Stop reason: HOSPADM

## 2021-12-28 RX ORDER — LIDOCAINE HYDROCHLORIDE 10 MG/ML
INJECTION, SOLUTION EPIDURAL; INFILTRATION; INTRACAUDAL; PERINEURAL AS NEEDED
Status: DISCONTINUED | OUTPATIENT
Start: 2021-12-28 | End: 2021-12-28 | Stop reason: HOSPADM

## 2021-12-28 RX ADMIN — ALPRAZOLAM 0.5 MG: 0.5 TABLET ORAL at 10:38

## 2021-12-28 NOTE — INTERVAL H&P NOTE
H&P reviewed  After examining the patient I find no changes in the patients condition since the H&P had been written      Vitals:    12/28/21 1042   BP: 138/92   Pulse: 74   Resp: 18   Temp: 98 7 °F (37 1 °C)   SpO2: 98%

## 2021-12-28 NOTE — PROCEDURES
Pre-procedure Diagnosis: Lumbar Facet Arthropathy  Post-procedure Diagnosis: Lumbar Facet Arthropathy  Procedure Title(s):  [BILATERAL L3, L4, L5] medial branch nerve blocks [(DIAGNOSTIC)]  Attending Surgeon:   Pretty Méndez MD  Anesthesia:   Local     Indications: The patient is a 54y o  year-old male with a diagnosis of lumbar facet arthropathy  The patient's history and physical exam were reviewed  The risks, benefits and alternatives to the procedure were discussed, and all questions were answered to the patient's satisfaction  The patient agreed to proceed, and written informed consent was obtained  Procedure in Detail: The patient was brought into the procedure room and placed in the prone position on the fluoroscopy table  The area of the lumbar spine was prepped with chloraprep and then draped in a sterile manner  AP fluoroscopy was used to identify the [L3-L5] levels on the [LEFT] side  The C-arm was obliqued to visualize the junction of the superior articulate process and transverse process  The sacral ala was identified and marked  The skin in these identified areas was anesthetized with 1% lidocaine  A 22-gauge, 3½-inch spinal needle was advanced toward each of these points under fluoroscopic guidance  Once bone was contacted, negative aspiration was confirmed and [1-mL] of a [6mL]mixture of [5mL] [0 25% bupivicaine] and 1mL of 80mg/mL Depomedrol was injected at each level  (The same procedure was performed on the opposite side )    After the procedure was completed, the patient's back was cleaned and bandages were placed at the needle insertion sites  Disposition: The patient tolerated the procedure well, and there were no apparent complications  The patient was taken to the recovery area where written discharge instructions for the procedure were given  The patient was given a pain diary to determine if the patient's pain improves following the injection   Once the diary is returned we will consider next appropriate course of treatment      Postoperative pain relief [WAS] significant    Estimated Blood Loss: None  Specimens Obtained: N/A

## 2021-12-28 NOTE — OP NOTE
PERATIVE REPORT  PATIENT NAME: Laura Andrade    :  1970  MRN: [id number]  Pt Location:  GI ROOM 01    SURGERY DATE: 2021    Surgeon(s) and Role: Baltazar Will MD - Primary    Preop Diagnosis:  Lumbar facet arthropathy [M47 816]    Post-Op Diagnosis Codes:     * Lumbar facet arthropathy [M47 816]    Procedure(s) (LRB):  BLOCK MEDIAL BRANCH Bilateral L3, L4, L5 #1 (Bilateral)    Specimen(s):  * No specimens in log *    Estimated Blood Loss:   Minimal    Drains:  * No LDAs found *    Anesthesia Type:   Local    Operative Indications:  Lumbar facet arthropathy [M47 816]    Operative Findings:  Bilateral L3, L4, L5 medial branch nerve regions identified under fluoroscopic guidance      Complications:   None    Procedure and Technique:  Please see detailed procedure note     I was present for the entire procedure    Patient Disposition:  PACU       SIGNATURE: Lauren Ro MD  DATE: 2021  TIME: 11:06 AM

## 2021-12-28 NOTE — DISCHARGE INSTRUCTIONS
PLEASE SCHEDULE 2 WEEK FOLLOW UP BY CALLING THE [facility] [place]: [phone]      MEDIAL BRANCH BLOCK DISCHARGE INSTRUCTIONS      ACTIVITY  · Please do activities that will bring the normal pain that we are rating  For example, if vacuuming or walking increases the painm do that  Umesh twill give the most accurate response to the diary  · You may shower, but no tub baths today, or applied heat  CARE OF THE INJECTION SITE  · This area may be numb for several hours after the injection  · Notify the [facility] if you have any of the following: redness, drainage, swelling or fever above 100°F     SPECIAL INSTRUCTIONS  · Please return the MBB diary to our office by mail, fax, or drop it off  MEDICATIONS  · Please do not take any break through or short acting pain medications for 8 hours after the block  · Continue to take all routine medications  · Our office may have instructed you to hold some medications  · You may resume _______________________________________________  If you have any problems specifically related to your procedure, please call our office at [phone]  Problems not related to your procedure should be directed at your primary care physician  Lumbar Radiofrequency Ablation   WHAT YOU NEED TO KNOW:   What do I need to know about lumbar radiofrequency ablation? Lumbar radiofrequency ablation (RFA) is a procedure used to treat facet joint pain in your lower back  Facet joints are found at the back of each vertebra  A needle electrode is used to send electrical currents to the nerves in your facet joint  The electrical currents create heat that damages the nerve so it cannot send pain signals  How do I prepare for lumbar RFA? Your healthcare provider will talk to you about how to prepare for this procedure  He may tell you not to eat or drink anything after midnight on the day of your procedure   He will tell you what medicines to take or not take on the day of your procedure  What will happen during lumbar RFA? · You will lie on your stomach  You will be given local anesthesia to numb the area of your back where the needle electrode will be inserted  You may be given a sedative to help keep you relaxed  You may still feel pressure or pushing during the procedure, but you should not feel any pain  Your healthcare provider will use fluoroscopy (a type of x-ray) to guide the needle electrode to the nerves near your facet joint  · Your healthcare provider may touch the affected nerve to make sure the needle electrode is in the right place  You will feel tingling or pressure when he does this  He will then apply local anesthesia to the nerve to numb it  This will prevent you from feeling pain when he applies heat to the nerve  Your healthcare provider will then apply heat to the nerve using the needle electrode  He may need to apply heat to more than one nerve  He will remove the needle electrode and apply a bandage over the area  What are the risks of lumbar RFA? You may have pain, numbness, tingling, or burning in the area where the lumbar RFA was done  These normally go away within 6 weeks  The needle electrode may injure your spinal nerves  This may cause permanent leg weakness or nerve pain  CARE AGREEMENT:   You have the right to help plan your care  Learn about your health condition and how it may be treated  Discuss treatment options with your healthcare providers to decide what care you want to receive  You always have the right to refuse treatment  The above information is an  only  It is not intended as medical advice for individual conditions or treatments  Talk to your doctor, nurse or pharmacist before following any medical regimen to see if it is safe and effective for you    © Copyright [employer] 2021 Information is for End User's use only and may not be sold, redistributed or otherwise used for commercial purposes   All illustrations and images included in CareNotes® are the copyrighted property of A D [employer] M [employer] Inc  or [facility] Pili Munoz

## 2022-01-12 ENCOUNTER — OFFICE VISIT (OUTPATIENT)
Dept: PAIN MEDICINE | Facility: CLINIC | Age: 52
End: 2022-01-12
Payer: COMMERCIAL

## 2022-01-12 VITALS
BODY MASS INDEX: 29.48 KG/M2 | RESPIRATION RATE: 20 BRPM | TEMPERATURE: 97.4 F | HEIGHT: 71 IN | DIASTOLIC BLOOD PRESSURE: 64 MMHG | HEART RATE: 80 BPM | WEIGHT: 210.6 LBS | SYSTOLIC BLOOD PRESSURE: 100 MMHG

## 2022-01-12 DIAGNOSIS — M47.816 LUMBAR FACET ARTHROPATHY: Primary | ICD-10-CM

## 2022-01-12 PROCEDURE — 99214 OFFICE O/P EST MOD 30 MIN: CPT | Performed by: ANESTHESIOLOGY

## 2022-01-12 RX ORDER — LIDOCAINE HYDROCHLORIDE 10 MG/ML
10 INJECTION, SOLUTION EPIDURAL; INFILTRATION; INTRACAUDAL; PERINEURAL ONCE
Status: CANCELLED | OUTPATIENT
Start: 2022-01-12 | End: 2022-01-12

## 2022-01-12 RX ORDER — BUPIVACAINE HCL/PF 2.5 MG/ML
10 VIAL (ML) INJECTION ONCE
Status: CANCELLED | OUTPATIENT
Start: 2022-01-12 | End: 2022-01-12

## 2022-01-12 RX ORDER — METHYLPREDNISOLONE ACETATE 80 MG/ML
80 INJECTION, SUSPENSION INTRA-ARTICULAR; INTRALESIONAL; INTRAMUSCULAR; PARENTERAL; SOFT TISSUE ONCE
Status: CANCELLED | OUTPATIENT
Start: 2022-01-12 | End: 2022-01-12

## 2022-01-12 NOTE — H&P (VIEW-ONLY)
Assessment  1  Lumbar facet arthropathy  -     Case request operating room: BLOCK MEDIAL BRANCH Bilateral L3, L4, L5 #2; Standing  -     Case request operating room: BLOCK MEDIAL BRANCH Bilateral L3, L4, L5 #2    Greater than 90% relief of pain with improved ability to participate with IADLs after bilateral L3, L4, L5 medial branch blocks #1  Previously described axial low back pain with aching, nagging, indolent, stabbing,, and characteristics  Positive facet loading maneuvers concordant with prominent lumbar facet arthropathy seen throughout lumbar spine on MRI  Risks, benefits and alternatives to lumbar medial branch blocks and subsequent radiofrequency ablation of successful thoroughly discussed with patient  Handouts provided questions answered to patient's satisfaction  Previously reported the following symptomatology:     Right low axial back pain in the area of right sacroiliac joint  Positive right-sided sacroiliac joint loading, positive Rony fingers test, positive Gaenslen's test   Aching, nagging, indolent, stabbing, throbbing pain  Additionally describes symptomatology of leg weakness and numbness that has been progressive  The pain resembles neurogenic claudication  No pertinent imaging available to review; however, pain with positive facet loading maneuvers bilaterally  5/5 strength in all extremities with active range of motion movements, negative SLR bilaterally  He has failed more than 12 weeks of conservative therapy including physical and chiropractic therapy  Reasonable at this time to proceed with multimodal pain therapy plan while obtaining MRI lumbar spine noncontrast to guide interventional therapy  Plan  -bilateral L3, L4, L5 medial branch blocks #2; f/u 2 weeks post procedure  -gabapentin 300 mg t i d  Ordered for patient; counseled regarding sedative effects of taking this medication and provided up titration calendar    Counseled not to take medication while driving or operating heavy machinery/using stairs  -DME TENS  -Meloxicam 7 5 mg b i d  prn pain Prescribed  Patient educated regarding bleeding risk of taking this medication not taking any other nonsteroidal anti-inflammatory medications while taking this medication; counseled thoroughly regarding potential risk of Cardiovascular injury, Kidney injury, Gastrointestinal ulceration/bleeding  Patient voiced understanding  -physical therapy for right-sided lumbar radiculopathy; Core exercises additionally provided for physician directed home PT that the patient plans to participate with for 1 hour, twice a week for the next 6 weeks  There are risks associated with opioid medications, including dependence, addiction and tolerance  The patient understands and agrees to use these medications only as prescribed  Potential side effects of the medications include, but are not limited to, constipation, drowsiness, addiction, impaired judgment and risk of fatal overdose if not taken as prescribed  The patient was warned against driving while taking sedation medications  Sharing medications is a felony  At this point in time, the patient is showing no signs of addiction, abuse, diversion or suicidal ideation  [place] Prescription Drug Monitoring Program report was reviewed and was appropriate     Complete risks and benefits including bleeding, infection, tissue reaction, nerve injury and allergic reaction were discussed  The approach was demonstrated using models and literature was provided  Verbal and written consent was obtained  My impressions and treatment recommendations were discussed in detail with the patient who verbalized understanding and had no further questions  Discharge instructions were provided  I personally saw and examined the patient and I agree with the above discussed plan of care  No orders of the defined types were placed in this encounter        History of Present Illness    Greater than 90% relief of pain with improved ability to participate with IADLs after bilateral L3, L4, L5 medial branch blocks #1  Previously described axial low back pain with aching, nagging, indolent, stabbing,, and characteristics  Positive facet loading maneuvers concordant with prominent lumbar facet arthropathy seen throughout lumbar spine on MRI  Risks, benefits and alternatives to lumbar medial branch blocks and subsequent radiofrequency ablation of successful thoroughly discussed with patient  Handouts provided questions answered to patient's satisfaction  Previously reported the following symptomatology:     Devon Wells is a 54 y o  male with pmhx of migraines, HTN presenting with right low axial back pain described primarily arthritic features  He describes 8/10 right low back pain that is worse in the mornings and worse at the end of the day  The pain is characterized by achy, nagging, indolent, crampy, stabbing pain in his axial right low back in the region of the sacroiliac joint  The patient describes that the pain is worse with standing for long periods of time on hard surfaces as well as with walking  The patient is a very active individual and feels as though this pain compromises his  participation with independent activities of daily living  The pain can be debilitating at times and contribute to significant disability, compromising overall activity and independent activities of daily living  He has tried chiropractic and physical therapy with limited relief of symptoms  Medications the patient has tried in the past include ibuprofen with limited relief  He describes   radicular symptoms in the bilateral L4 and L5 dermatomes but otherwise has good strength  He endorses weakness numbness or paresthesias  The patient denies any bowel or bladder dysfunction as well   The patient works as a  and states that he has significant debilitation with independent activities of daily living and overall function  Additionally describes neck pain with arthritic features; pain with lateral rotation/extesion flexion  Chiropractic and PT did help with this pain in the past as have nsaids  I have personally reviewed and/or updated the patient's past medical history, past surgical history, family history, social history, current medications, allergies, and vital signs today  Review of Systems   Constitutional: Positive for activity change  HENT: Negative  Eyes: Negative  Respiratory: Negative  Cardiovascular: Negative  Gastrointestinal: Negative  Endocrine: Negative  Genitourinary: Negative  Musculoskeletal: Positive for arthralgias, back pain, gait problem and myalgias  Skin: Negative  Allergic/Immunologic: Negative  Neurological: Positive for weakness and numbness  Hematological: Negative  Psychiatric/Behavioral: Negative  All other systems reviewed and are negative        Patient Active Problem List   Diagnosis    Essential hypertension    Cellulitis of left lower extremity    Fever in adult       Past Medical History:   Diagnosis Date    Hypertension     Migraines        Past Surgical History:   Procedure Laterality Date    NERVE BLOCK Bilateral 12/28/2021    Procedure: BLOCK MEDIAL BRANCH Bilateral L3, L4, L5 #1;  Surgeon: Fabienne Corcoran MD;  Location: [facility];  Service: Pain Management     [place] INJECTION,SACROILIAC JOINT Right 12/2/2021    Procedure: Interlaminar epidural steroid injection at L4-L5 ;  Surgeon: Fabienne Corcoran MD;  Location: [facility];  Service: Pain Management     SHOULDER SURGERY Left        Family History   Problem Relation Age of Onset    Diabetes Mother     Migraines Mother     Hypertension Father        Social History     Occupational History    Not on file   Tobacco Use    Smoking status: Former Smoker     Packs/day: 1 00     Years: 10 00     Pack years: 10 00     Types: Cigarettes     Quit date: 5/19/2000     Years since quittin 6    Smokeless tobacco: Never Used   Vaping Use    Vaping Use: Never used   Substance and Sexual Activity    Alcohol use: Not Currently    Drug use: Yes     Types: Marijuana     Comment: Occasional marijuana    Sexual activity: Not on file       Current Outpatient Medications on File Prior to Visit   Medication Sig    cholecalciferol (VITAMIN D3) 400 units tablet Take 400 Units by mouth daily    Diclofenac Sodium (VOLTAREN) 1 % Apply to affected area twice a day as needed for pain    fish oil-omega-3 fatty acids 1000 MG capsule Take 1 capsule by mouth    gabapentin (NEURONTIN) 300 mg capsule Take 1 capsule (300 mg total) by mouth 3 (three) times a day    glucosamine-chondroitin 500-400 MG tablet Take 1 tablet by mouth    hydrochlorothiazide (HYDRODIURIL) 50 mg tablet Take 50 mg by mouth daily    losartan (COZAAR) 25 mg tablet     magnesium 30 MG tablet Take 200 mg by mouth 2 (two) times a day    meloxicam (MOBIC) 7 5 mg tablet TAKE 1 TABLET (7 5 MG TOTAL) BY MOUTH 2 (TWO) TIMES A DAY AS NEEDED FOR MODERATE PAIN    Nerve Stimulator (TENS Therapy Pain Relief) SASHA Use 1 application 2 (two) times a day as needed (moderate pain)    SUMAtriptan (IMITREX) 25 mg tablet      No current facility-administered medications on file prior to visit  Allergies   Allergen Reactions    Penicillins Hives     Hives           Physical Exam    /64   Pulse 80   Temp (!) 97 4 °F (36 3 °C)   Resp 20   Ht 5' 11" (1 803 m)   Wt 95 5 kg (210 lb 9 6 oz)   BMI 29 37 kg/m²     Constitutional: normal, well developed, well nourished, alert, in no distress and non-toxic and no overt pain behavior   and overweight  Eyes: anicteric  HEENT: grossly intact  Neck: supple, symmetric, trachea midline and no masses   Pulmonary:even and unlabored  Cardiovascular:No edema or pitting edema present  Skin:Normal without rashes or lesions and well hydrated  Psychiatric:Mood and affect appropriate  Neurologic:Cranial Nerves II-XII grossly intact Sensation grossly intact; no clonus negative rodas's  Reflexes 2+ and brisk  SLR negative bilaterally  Musculoskeletal:normal gait  5/5 strength in all extremities with active range of motion movements bilaterally  Normal heel toe and tip toe walking  pain with positive facet loading maneuvers bilaterally and with lateral spine rotation  No ttp over lumbar paraspinal muscles  Positive right-sided sacroiliac joint loading, positive Rony fingers test, positive Gaenslen's test       Imaging    MRI LUMBAR SPINE WITHOUT CONTRAST     INDICATION: M54 [id number]: Radiculopathy, lumbar region  Low back pain with numbness into the left leg      COMPARISON:  None      TECHNIQUE:  Sagittal T1, sagittal T2, sagittal inversion recovery, axial T1 and axial T2, coronal T2     IMAGE QUALITY:  Diagnostic     FINDINGS:     VERTEBRAL BODIES:  There are 5 lumbar type vertebral bodies  Levoscoliosis apex L1-2  Slight retrolisthesis L1-2 also noted  Modic type I degenerative marrow signal L1-2 eccentric to the right      SACRUM:  Normal signal within the sacrum  No evidence of insufficiency or stress fracture      DISTAL CORD AND CONUS:  Normal size and signal within the distal cord and conus      PARASPINAL SOFT TISSUES:  Paraspinal soft tissues are unremarkable      LOWER THORACIC DISC SPACES:  Normal disc height and signal   No disc herniation, canal stenosis or foraminal narrowing      LUMBAR DISC SPACES:     L1-L2:  Degenerative disc osteophyte complex with marginal osteophytes noted with a superimposed right paramedian protrusion type disc herniation  There is moderate right lateral recess stenosis  Correlate for right L2 radiculopathy  Severe right and   mild left foraminal narrowing with mild central stenosis      L2-L3:  Degenerative disc osteophyte complex with marginal osteophytes results in mild bilateral foraminal narrowing and mild central stenosis  Mild/moderate left lateral recess stenosis      L3-L4:  Mild annular bulge small marginal osteophytes result in moderate right and mild to moderate left foraminal narrowing with mild central stenosis      L4-L5:  Moderate to severe left and mild right facet hypertrophy identified  There is a disc osteophyte complex with marginal osteophytes resulting in moderate to severe left foraminal narrowing  Correlate for left L4 radiculopathy  Mild central   stenosis and moderate left lateral recess stenosis noted      L5-S1:  Moderate facet hypertrophy identified  There is annular bulging and small marginal osteophytes resulting in mild right foraminal narrowing and minimal central stenosis      IMPRESSION:     Spondylotic degenerative changes are seen throughout the lumbar spine  There is right lateral recess stenosis at L1-2 secondary to right paramedian protrusion type disc herniation with marginal osteophyte contributing to severe right foraminal   narrowing  Choroid for right L1 or right 2 radiculopathy      Severe left foraminal narrowing noted at L4-5 secondary to spondylotic degenerative changes  Correlate for left L4 radiculopathy      Levoscoliosis noted with its apex at L1-2  Degenerative changes are noted at remaining lumbar levels as described

## 2022-01-12 NOTE — PATIENT INSTRUCTIONS
Core Strengthening Exercises   WHAT YOU NEED TO KNOW:   Your core includes the muscles of your lower back, hip, pelvis, and abdomen  Core strengthening exercises help heal and strengthen these muscles  This helps prevent another injury, and keeps your pelvis, spine, and hips in the correct position  DISCHARGE INSTRUCTIONS:   Contact your healthcare provider if:   · You have sharp or worsening pain during exercise or at rest     · You have questions or concerns about your shoulder exercises  Safety tips:  Talk to your healthcare provider before you start an exercise program  A physical therapist can teach you how to do core strengthening exercises safely  · Do the exercises on a mat or firm surface  A firm surface will support your spine and prevent low back pain  Do not do these exercises on a bed  · Move slowly and smoothly  Avoid fast or jerky motions  · Stop if you feel pain  Core exercises should not be painful  Stop if you feel pain  · Breathe normally during core exercises  Do not hold your breath  This may cause an increase in blood pressure and prevent muscle strengthening  Your healthcare provider will tell you when to inhale and exhale during the exercise  · Begin all of your exercises with abdominal bracing  Abdominal bracing helps warm up your core muscles  You can also practice abdominal bracing throughout the day  Lie on your back with your knees bent and feet flat on the floor  Place your arms in a relaxed position beside your body  Tighten your abdominal muscles  Pull your belly button in and up toward your spine  Hold for 5 seconds  Relax your muscles  Repeat 10 times  Core strengthening exercises: Your healthcare provider will tell you how often to do these exercises  The provider will also tell you how many repetitions of each exercise you should do  Hold each exercise for 5 seconds or as directed  As you get stronger, increase your hold to 10 to 15 seconds   You can do some of these exercises on a stability ball, or with a weight  Ask your healthcare provider how to use a stability ball or weight for these exercises:  · Bridging:  Lie on your back with your knees bent and feet flat on the floor  Rest your arms at your side  Tighten your buttocks, and then lift your hips 1 inch off the floor  Hold for 5 seconds  When you can do this exercise without pain for 10 seconds, increase the distance you lift your hips  A good goal is to be able to lift your hips so that your shoulders, hips, and knees are in a straight line  · Dead bug:  Lie on your back with your knees bent and feet flat on the floor  Place your arms in a relaxed position beside your body  Begin with abdominal bracing  Next, raise one leg, keeping your knee bent  Hold for 5 seconds  Repeat with the other leg  When you can do this exercise without pain for 10 to 15 seconds, you may raise one straight leg and hold  Repeat with the other leg  · Quadruped:  Place your hands and knees on the floor  Keep your wrists directly below your shoulders and your knees directly below your hips  Pull your belly button in toward your spine  Do not flatten or arch your back  Tighten your abdominal muscles below your belly button  Hold for 5 seconds  When you can do this exercise without pain for 10 to 15 seconds, you may extend one arm and hold  Repeat on the other side  · Side bridge exercises:      ? Standing side bridge:  Stand next to a wall and extend one arm toward the wall  Place your palm flat on the wall with your fingers pointing upward  Begin with abdominal bracing  Next, without moving your feet, slowly bend your arm to 90 degrees  Hold for 5 seconds  Repeat on the other side  When you can do this exercise without pain for 10 to 15 seconds, you may do the bent leg side bridge on the floor  ? Bent leg side bridge:  Lie on one side with your legs, hips, and shoulders in a straight line   Prop yourself up onto your forearm so your elbow is directly below your shoulder  Bend your knees back to 90 degrees  Begin with abdominal bracing  Next, lift your hips and balance yourself on your forearm and knees  Hold for 5 seconds  Repeat on the other side  When you can do this exercise without pain for 10 to 15 seconds, you may do the straight leg side bridge on the floor  ? Straight leg side bridge:  Lie on one side with your legs, hips, and shoulders in a straight line  Prop yourself up onto your forearm so your elbow is directly below your shoulder  Begin with abdominal bracing  Lift your hips off the floor and balance yourself on your forearm and the outside of your flexed foot  Do not let your ankle bend sideways  Hold for 5 seconds  Repeat on the other side  When you can do this exercise without pain for 10 to 15 seconds, ask your healthcare provider for more advanced exercises  · Superman:  Lie on your stomach  Extend your arms forward on the floor  Tighten your abdominal muscles and lift your right hand and left leg off the floor  Hold this position  Slowly return to the starting position  Tighten your abdominal muscles and lift your left hand and right leg off the floor  Hold this position  Slowly return to the starting position  · Clam:  Lie on your side with your knees bent  Put your bottom arm under your head to keep your neck in line  Put your top hand on your hip to keep your pelvis from moving  Put your heels together, and keep them together during this exercise  Slowly raise your top knee toward the ceiling  Then lower your leg so your knees are together  Repeat this exercise 10 times  Then switch sides and do the exercise 10 times with the other leg  · Curl up:  Lie on your back with your knees bent and feet flat on the floor  Place your hands, palms down, underneath your lower back   Next, with your elbows on the floor, lift your shoulders and chest 2 to 3 inches off the floor  Keep your head in line with your shoulders  Hold this position  Slowly return to the starting position  · Straight leg raises:  Lie on your back with one leg straight  Bend the other knee and place your foot flat on the floor  Tighten your abdominal muscles  Keep your leg straight and slowly lift it straight up 6 to 12 inches off the floor  Hold this position  Lower your leg slowly  Do as many repetitions as directed on this side  Repeat with the other leg  · Plank:  Lie on your stomach  Bend your elbows and place your forearms flat on the floor  Lift your chest, stomach, and knees off the floor  Make sure your elbows are below your shoulders  Your body should be in a straight line  Do not let your hips or lower back sink to the ground  Squeeze your abdominal muscles together and hold for 15 seconds  To make this exercise harder, hold for 30 seconds or lift 1 leg at a time  · Bicycles:  Lie on your back  Bend both knees and bring them toward your chest  Your calves should be parallel to the floor  Place the palms of your hands on the back of your head  Straighten your right leg and keep it lifted 2 inches off the floor  Raise your head and shoulders off the floor and twist towards your left  Keep your head and shoulders lifted  Bend your right knee while you straighten your left leg  Keep your left leg 2 inches off the floor  Twist your head and chest towards the left leg  Continue to straighten 1 leg at a time and twist        Follow up with your doctor as directed:  Write down your questions so you remember to ask them during your visits  © Copyright [employer] 2021 Information is for End User's use only and may not be sold, redistributed or otherwise used for commercial purposes  All illustrations and images included in CareNotes® are the copyrighted property of [employer]  or [facility]   The above information is an  only   It is not intended as medical advice for individual conditions or treatments  Talk to your doctor, nurse or pharmacist before following any medical regimen to see if it is safe and effective for you

## 2022-01-12 NOTE — PROGRESS NOTES
Assessment  1  Lumbar facet arthropathy  -     Case request operating room: BLOCK MEDIAL BRANCH Bilateral L3, L4, L5 #2; Standing  -     Case request operating room: BLOCK MEDIAL BRANCH Bilateral L3, L4, L5 #2    Greater than 90% relief of pain with improved ability to participate with IADLs after bilateral L3, L4, L5 medial branch blocks #1  Previously described axial low back pain with aching, nagging, indolent, stabbing,, and characteristics  Positive facet loading maneuvers concordant with prominent lumbar facet arthropathy seen throughout lumbar spine on MRI  Risks, benefits and alternatives to lumbar medial branch blocks and subsequent radiofrequency ablation of successful thoroughly discussed with patient  Handouts provided questions answered to patient's satisfaction  Previously reported the following symptomatology:     Right low axial back pain in the area of right sacroiliac joint  Positive right-sided sacroiliac joint loading, positive Rony fingers test, positive Gaenslen's test   Aching, nagging, indolent, stabbing, throbbing pain  Additionally describes symptomatology of leg weakness and numbness that has been progressive  The pain resembles neurogenic claudication  No pertinent imaging available to review; however, pain with positive facet loading maneuvers bilaterally  5/5 strength in all extremities with active range of motion movements, negative SLR bilaterally  He has failed more than 12 weeks of conservative therapy including physical and chiropractic therapy  Reasonable at this time to proceed with multimodal pain therapy plan while obtaining MRI lumbar spine noncontrast to guide interventional therapy  Plan  -bilateral L3, L4, L5 medial branch blocks #2; f/u 2 weeks post procedure  -gabapentin 300 mg t i d  Ordered for patient; counseled regarding sedative effects of taking this medication and provided up titration calendar    Counseled not to take medication while driving or operating heavy machinery/using stairs  -DME TENS  -Meloxicam 7 5 mg b i d  prn pain Prescribed  Patient educated regarding bleeding risk of taking this medication not taking any other nonsteroidal anti-inflammatory medications while taking this medication; counseled thoroughly regarding potential risk of Cardiovascular injury, Kidney injury, Gastrointestinal ulceration/bleeding  Patient voiced understanding  -physical therapy for right-sided lumbar radiculopathy; Core exercises additionally provided for physician directed home PT that the patient plans to participate with for 1 hour, twice a week for the next 6 weeks  There are risks associated with opioid medications, including dependence, addiction and tolerance  The patient understands and agrees to use these medications only as prescribed  Potential side effects of the medications include, but are not limited to, constipation, drowsiness, addiction, impaired judgment and risk of fatal overdose if not taken as prescribed  The patient was warned against driving while taking sedation medications  Sharing medications is a felony  At this point in time, the patient is showing no signs of addiction, abuse, diversion or suicidal ideation  [place] Prescription Drug Monitoring Program report was reviewed and was appropriate     Complete risks and benefits including bleeding, infection, tissue reaction, nerve injury and allergic reaction were discussed  The approach was demonstrated using models and literature was provided  Verbal and written consent was obtained  My impressions and treatment recommendations were discussed in detail with the patient who verbalized understanding and had no further questions  Discharge instructions were provided  I personally saw and examined the patient and I agree with the above discussed plan of care  No orders of the defined types were placed in this encounter        History of Present Illness    Greater than 90% relief of pain with improved ability to participate with IADLs after bilateral L3, L4, L5 medial branch blocks #1  Previously described axial low back pain with aching, nagging, indolent, stabbing,, and characteristics  Positive facet loading maneuvers concordant with prominent lumbar facet arthropathy seen throughout lumbar spine on MRI  Risks, benefits and alternatives to lumbar medial branch blocks and subsequent radiofrequency ablation of successful thoroughly discussed with patient  Handouts provided questions answered to patient's satisfaction  Previously reported the following symptomatology:     Lauro Acuña is a 54 y o  male with pmhx of migraines, HTN presenting with right low axial back pain described primarily arthritic features  He describes 8/10 right low back pain that is worse in the mornings and worse at the end of the day  The pain is characterized by achy, nagging, indolent, crampy, stabbing pain in his axial right low back in the region of the sacroiliac joint  The patient describes that the pain is worse with standing for long periods of time on hard surfaces as well as with walking  The patient is a very active individual and feels as though this pain compromises his  participation with independent activities of daily living  The pain can be debilitating at times and contribute to significant disability, compromising overall activity and independent activities of daily living  He has tried chiropractic and physical therapy with limited relief of symptoms  Medications the patient has tried in the past include ibuprofen with limited relief  He describes   radicular symptoms in the bilateral L4 and L5 dermatomes but otherwise has good strength  He endorses weakness numbness or paresthesias  The patient denies any bowel or bladder dysfunction as well   The patient works as a  and states that he has significant debilitation with independent activities of daily living and overall function  Additionally describes neck pain with arthritic features; pain with lateral rotation/extesion flexion  Chiropractic and PT did help with this pain in the past as have nsaids  I have personally reviewed and/or updated the patient's past medical history, past surgical history, family history, social history, current medications, allergies, and vital signs today  Review of Systems   Constitutional: Positive for activity change  HENT: Negative  Eyes: Negative  Respiratory: Negative  Cardiovascular: Negative  Gastrointestinal: Negative  Endocrine: Negative  Genitourinary: Negative  Musculoskeletal: Positive for arthralgias, back pain, gait problem and myalgias  Skin: Negative  Allergic/Immunologic: Negative  Neurological: Positive for weakness and numbness  Hematological: Negative  Psychiatric/Behavioral: Negative  All other systems reviewed and are negative        Patient Active Problem List   Diagnosis    Essential hypertension    Cellulitis of left lower extremity    Fever in adult       Past Medical History:   Diagnosis Date    Hypertension     Migraines        Past Surgical History:   Procedure Laterality Date    NERVE BLOCK Bilateral 12/28/2021    Procedure: BLOCK MEDIAL BRANCH Bilateral L3, L4, L5 #1;  Surgeon: Dominique Rajput MD;  Location: [facility];  Service: Pain Management     MO INJECTION,SACROILIAC JOINT Right 12/2/2021    Procedure: Interlaminar epidural steroid injection at L4-L5 ;  Surgeon: Dominique Rajput MD;  Location: [facility];  Service: Pain Management     SHOULDER SURGERY Left        Family History   Problem Relation Age of Onset    Diabetes Mother     Migraines Mother     Hypertension Father        Social History     Occupational History    Not on file   Tobacco Use    Smoking status: Former Smoker     Packs/day: 1 00     Years: 10 00     Pack years: 10 00     Types: Cigarettes     Quit date: 5/19/2000     Years since quittin 6    Smokeless tobacco: Never Used   Vaping Use    Vaping Use: Never used   Substance and Sexual Activity    Alcohol use: Not Currently    Drug use: Yes     Types: Marijuana     Comment: Occasional marijuana    Sexual activity: Not on file       Current Outpatient Medications on File Prior to Visit   Medication Sig    cholecalciferol (VITAMIN D3) 400 units tablet Take 400 Units by mouth daily    Diclofenac Sodium (VOLTAREN) 1 % Apply to affected area twice a day as needed for pain    fish oil-omega-3 fatty acids 1000 MG capsule Take 1 capsule by mouth    gabapentin (NEURONTIN) 300 mg capsule Take 1 capsule (300 mg total) by mouth 3 (three) times a day    glucosamine-chondroitin 500-400 MG tablet Take 1 tablet by mouth    hydrochlorothiazide (HYDRODIURIL) 50 mg tablet Take 50 mg by mouth daily    losartan (COZAAR) 25 mg tablet     magnesium 30 MG tablet Take 200 mg by mouth 2 (two) times a day    meloxicam (MOBIC) 7 5 mg tablet TAKE 1 TABLET (7 5 MG TOTAL) BY MOUTH 2 (TWO) TIMES A DAY AS NEEDED FOR MODERATE PAIN    Nerve Stimulator (TENS Therapy Pain Relief) SASHA Use 1 application 2 (two) times a day as needed (moderate pain)    SUMAtriptan (IMITREX) 25 mg tablet      No current facility-administered medications on file prior to visit  Allergies   Allergen Reactions    Penicillins Hives     Hives           Physical Exam    /64   Pulse 80   Temp (!) 97 4 °F (36 3 °C)   Resp 20   Ht 5' 11" (1 803 m)   Wt 95 5 kg (210 lb 9 6 oz)   BMI 29 37 kg/m²     Constitutional: normal, well developed, well nourished, alert, in no distress and non-toxic and no overt pain behavior   and overweight  Eyes: anicteric  HEENT: grossly intact  Neck: supple, symmetric, trachea midline and no masses   Pulmonary:even and unlabored  Cardiovascular:No edema or pitting edema present  Skin:Normal without rashes or lesions and well hydrated  Psychiatric:Mood and affect appropriate  Neurologic:Cranial Nerves II-XII grossly intact Sensation grossly intact; no clonus negative rodas's  Reflexes 2+ and brisk  SLR negative bilaterally  Musculoskeletal:normal gait  5/5 strength in all extremities with active range of motion movements bilaterally  Normal heel toe and tip toe walking  pain with positive facet loading maneuvers bilaterally and with lateral spine rotation  No ttp over lumbar paraspinal muscles  Positive right-sided sacroiliac joint loading, positive Rony fingers test, positive Gaenslen's test       Imaging    MRI LUMBAR SPINE WITHOUT CONTRAST     INDICATION: M54 [id number]: Radiculopathy, lumbar region  Low back pain with numbness into the left leg      COMPARISON:  None      TECHNIQUE:  Sagittal T1, sagittal T2, sagittal inversion recovery, axial T1 and axial T2, coronal T2     IMAGE QUALITY:  Diagnostic     FINDINGS:     VERTEBRAL BODIES:  There are 5 lumbar type vertebral bodies  Levoscoliosis apex L1-2  Slight retrolisthesis L1-2 also noted  Modic type I degenerative marrow signal L1-2 eccentric to the right      SACRUM:  Normal signal within the sacrum  No evidence of insufficiency or stress fracture      DISTAL CORD AND CONUS:  Normal size and signal within the distal cord and conus      PARASPINAL SOFT TISSUES:  Paraspinal soft tissues are unremarkable      LOWER THORACIC DISC SPACES:  Normal disc height and signal   No disc herniation, canal stenosis or foraminal narrowing      LUMBAR DISC SPACES:     L1-L2:  Degenerative disc osteophyte complex with marginal osteophytes noted with a superimposed right paramedian protrusion type disc herniation  There is moderate right lateral recess stenosis  Correlate for right L2 radiculopathy  Severe right and   mild left foraminal narrowing with mild central stenosis      L2-L3:  Degenerative disc osteophyte complex with marginal osteophytes results in mild bilateral foraminal narrowing and mild central stenosis  Mild/moderate left lateral recess stenosis      L3-L4:  Mild annular bulge small marginal osteophytes result in moderate right and mild to moderate left foraminal narrowing with mild central stenosis      L4-L5:  Moderate to severe left and mild right facet hypertrophy identified  There is a disc osteophyte complex with marginal osteophytes resulting in moderate to severe left foraminal narrowing  Correlate for left L4 radiculopathy  Mild central   stenosis and moderate left lateral recess stenosis noted      L5-S1:  Moderate facet hypertrophy identified  There is annular bulging and small marginal osteophytes resulting in mild right foraminal narrowing and minimal central stenosis      IMPRESSION:     Spondylotic degenerative changes are seen throughout the lumbar spine  There is right lateral recess stenosis at L1-2 secondary to right paramedian protrusion type disc herniation with marginal osteophyte contributing to severe right foraminal   narrowing  Choroid for right L1 or right 2 radiculopathy      Severe left foraminal narrowing noted at L4-5 secondary to spondylotic degenerative changes  Correlate for left L4 radiculopathy      Levoscoliosis noted with its apex at L1-2  Degenerative changes are noted at remaining lumbar levels as described

## 2022-01-20 ENCOUNTER — APPOINTMENT (OUTPATIENT)
Dept: RADIOLOGY | Facility: HOSPITAL | Age: 52
End: 2022-01-20
Payer: COMMERCIAL

## 2022-01-20 ENCOUNTER — HOSPITAL ENCOUNTER (OUTPATIENT)
Facility: HOSPITAL | Age: 52
Setting detail: OUTPATIENT SURGERY
Discharge: HOME | End: 2022-01-20
Attending: ANESTHESIOLOGY | Admitting: ANESTHESIOLOGY
Payer: COMMERCIAL

## 2022-01-20 VITALS
HEART RATE: 61 BPM | HEIGHT: 71 IN | RESPIRATION RATE: 18 BRPM | BODY MASS INDEX: 29.4 KG/M2 | WEIGHT: 210 LBS | SYSTOLIC BLOOD PRESSURE: 142 MMHG | OXYGEN SATURATION: 98 % | DIASTOLIC BLOOD PRESSURE: 84 MMHG | TEMPERATURE: 98.4 F

## 2022-01-20 PROCEDURE — 64494 INJ PARAVERT F JNT L/S 2 LEV: CPT | Performed by: ANESTHESIOLOGY

## 2022-01-20 PROCEDURE — 64493 INJ PARAVERT F JNT L/S 1 LEV: CPT | Performed by: ANESTHESIOLOGY

## 2022-01-20 RX ORDER — BUPIVACAINE HYDROCHLORIDE 2.5 MG/ML
INJECTION, SOLUTION EPIDURAL; INFILTRATION; INTRACAUDAL AS NEEDED
Status: DISCONTINUED | OUTPATIENT
Start: 2022-01-20 | End: 2022-01-20 | Stop reason: HOSPADM

## 2022-01-20 RX ORDER — LIDOCAINE HYDROCHLORIDE 10 MG/ML
INJECTION, SOLUTION EPIDURAL; INFILTRATION; INTRACAUDAL; PERINEURAL AS NEEDED
Status: DISCONTINUED | OUTPATIENT
Start: 2022-01-20 | End: 2022-01-20 | Stop reason: HOSPADM

## 2022-01-20 RX ORDER — DEXAMETHASONE SODIUM PHOSPHATE 10 MG/ML
INJECTION, SOLUTION INTRAMUSCULAR; INTRAVENOUS AS NEEDED
Status: DISCONTINUED | OUTPATIENT
Start: 2022-01-20 | End: 2022-01-20 | Stop reason: HOSPADM

## 2022-01-20 RX ORDER — ALPRAZOLAM 0.5 MG/1
0.5 TABLET ORAL ONCE
Status: COMPLETED | OUTPATIENT
Start: 2022-01-20 | End: 2022-01-20

## 2022-01-20 RX ADMIN — ALPRAZOLAM 0.5 MG: 0.5 TABLET ORAL at 09:54

## 2022-01-20 NOTE — OP NOTE
PERATIVE REPORT  PATIENT NAME: Genna Last    :  1970  MRN: [id number]  Pt Location:  GI ROOM 01    SURGERY DATE: 2022    Surgeon(s) and Role: Eduard Neri MD - Primary    Preop Diagnosis:  Lumbar facet arthropathy [M47 816]    Post-Op Diagnosis Codes:     * Lumbar facet arthropathy [M47 816]    Procedure(s) (LRB):  BLOCK MEDIAL BRANCH Bilateral L3, L4, L5 #2 (Bilateral)    Specimen(s):  * No specimens in log *    Estimated Blood Loss:   Minimal    Drains:  * No LDAs found *    Anesthesia Type:   Local    Operative Indications:  Lumbar facet arthropathy [M47 816]    Operative Findings:  Bilateral L3, L4, L5 medial branch nerve regions identified under fluorscopic guidance      Complications:   None    Procedure and Technique:  Please see detailed procedure note     I was present for the entire procedure    Patient Disposition:  PACU       SIGNATURE: Estelle Alpers, MD  DATE: 2022  TIME: 10:30 AM

## 2022-01-20 NOTE — PROCEDURES
Pre-procedure Diagnosis: Lumbar Facet Arthropathy  Post-procedure Diagnosis: Lumbar Facet Arthropathy  Procedure Title(s):  [Bilateral L3, L4, L5] medial branch nerve blocks [(CONFIRMATORY)]  Attending Surgeon:   Brien Davis MD  Anesthesia:   Local     Indications: The patient is a 54y o  year-old male with a diagnosis of lumbar facet arthropathy  The patient's history and physical exam were reviewed  The risks, benefits and alternatives to the procedure were discussed, and all questions were answered to the patient's satisfaction  The patient agreed to proceed, and written informed consent was obtained  Procedure in Detail: The patient was brought into the procedure room and placed in the prone position on the fluoroscopy table  The area of the lumbar spine was prepped with chloraprep and then draped in a sterile manner  AP fluoroscopy was used to identify the [L3-L5] levels on the [LEFT] side  The C-arm was obliqued to visualize the junction of the superior articulate process and transverse process  The sacral ala was identified and marked  The skin in these identified areas was anesthetized with 1% lidocaine  A 22-gauge, 3½-inch spinal needle was advanced toward each of these points under fluoroscopic guidance  Once bone was contacted, negative aspiration was confirmed and [1-mL] of a [5mL]mixture of [6mL] [0 25% bupivicaine] and 1mL of 10mg/mL Dexamethasone was injected at each level  (The same procedure was performed on the opposite side )    After the procedure was completed, the patient's back was cleaned and bandages were placed at the needle insertion sites  Disposition: The patient tolerated the procedure well, and there were no apparent complications  The patient was taken to the recovery area where written discharge instructions for the procedure were given  The patient was given a pain diary to determine if the patient's pain improves following the injection   Once the diary is returned we will consider next appropriate course of treatment      Postoperative pain relief [WAS] significant    Estimated Blood Loss: None  Specimens Obtained: N/A

## 2022-01-20 NOTE — INTERVAL H&P NOTE
H&P reviewed  After examining the patient I find no changes in the patients condition since the H&P had been written      Vitals:    01/20/22 0953   BP: 130/75   Pulse: 75   Resp: 20   Temp: 98 7 °F (37 1 °C)   SpO2: 97%

## 2022-01-20 NOTE — DISCHARGE INSTRUCTIONS
PLEASE SCHEDULE 2 WEEK FOLLOW UP BY CALLING THE [facility] AT [place]: [phone]      MEDIAL BRANCH BLOCK DISCHARGE INSTRUCTIONS      ACTIVITY  · Please do activities that will bring the normal pain that we are rating  For example, if vacuuming or walking increases the painm do that  Umesh twill give the most accurate response to the diary  · You may shower, but no tub baths today, or applied heat  CARE OF THE INJECTION SITE  · This area may be numb for several hours after the injection  · Notify the [facility] if you have any of the following: redness, drainage, swelling or fever above 100°F     SPECIAL INSTRUCTIONS  · Please return the MBB diary to our office by mail, fax, or drop it off  MEDICATIONS  · Please do not take any break through or short acting pain medications for 8 hours after the block  · Continue to take all routine medications  · Our office may have instructed you to hold some medications  · You may resume _______________________________________________  If you have any problems specifically related to your procedure, please call our office at [phone]  Problems not related to your procedure should be directed at your primary care physician  Lumbar Radiofrequency Ablation   WHAT YOU NEED TO KNOW:   What do I need to know about lumbar radiofrequency ablation? Lumbar radiofrequency ablation (RFA) is a procedure used to treat facet joint pain in your lower back  Facet joints are found at the back of each vertebra  A needle electrode is used to send electrical currents to the nerves in your facet joint  The electrical currents create heat that damages the nerve so it cannot send pain signals  How do I prepare for lumbar RFA? Your healthcare provider will talk to you about how to prepare for this procedure  He may tell you not to eat or drink anything after midnight on the day of your procedure   He will tell you what medicines to take or not take on the day of your procedure  What will happen during lumbar RFA? · You will lie on your stomach  You will be given local anesthesia to numb the area of your back where the needle electrode will be inserted  You may be given a sedative to help keep you relaxed  You may still feel pressure or pushing during the procedure, but you should not feel any pain  Your healthcare provider will use fluoroscopy (a type of x-ray) to guide the needle electrode to the nerves near your facet joint  · Your healthcare provider may touch the affected nerve to make sure the needle electrode is in the right place  You will feel tingling or pressure when he does this  He will then apply local anesthesia to the nerve to numb it  This will prevent you from feeling pain when he applies heat to the nerve  Your healthcare provider will then apply heat to the nerve using the needle electrode  He may need to apply heat to more than one nerve  He will remove the needle electrode and apply a bandage over the area  What are the risks of lumbar RFA? You may have pain, numbness, tingling, or burning in the area where the lumbar RFA was done  These normally go away within 6 weeks  The needle electrode may injure your spinal nerves  This may cause permanent leg weakness or nerve pain  CARE AGREEMENT:   You have the right to help plan your care  Learn about your health condition and how it may be treated  Discuss treatment options with your healthcare providers to decide what care you want to receive  You always have the right to refuse treatment  The above information is an  only  It is not intended as medical advice for individual conditions or treatments  Talk to your doctor, nurse or pharmacist before following any medical regimen to see if it is safe and effective for you    © Copyright [employer] 2021 Information is for End User's use only and may not be sold, redistributed or otherwise used for commercial purposes   All illustrations and images included in CareNotes® are the copyrighted property of [employer] Inc  or [facility]

## 2022-02-14 ENCOUNTER — OFFICE VISIT (OUTPATIENT)
Dept: PAIN MEDICINE | Facility: CLINIC | Age: 52
End: 2022-02-14
Payer: COMMERCIAL

## 2022-02-14 VITALS
RESPIRATION RATE: 20 BRPM | HEART RATE: 66 BPM | DIASTOLIC BLOOD PRESSURE: 92 MMHG | WEIGHT: 210.4 LBS | HEIGHT: 71 IN | TEMPERATURE: 97.6 F | SYSTOLIC BLOOD PRESSURE: 118 MMHG | BODY MASS INDEX: 29.46 KG/M2

## 2022-02-14 DIAGNOSIS — M47.816 LUMBAR FACET ARTHROPATHY: Primary | ICD-10-CM

## 2022-02-14 PROCEDURE — 99214 OFFICE O/P EST MOD 30 MIN: CPT | Performed by: ANESTHESIOLOGY

## 2022-02-14 RX ORDER — BUPIVACAINE HCL/PF 2.5 MG/ML
5 VIAL (ML) INJECTION ONCE
Status: CANCELLED | OUTPATIENT
Start: 2022-02-14 | End: 2022-02-14

## 2022-02-14 RX ORDER — LOSARTAN POTASSIUM 50 MG/1
50 TABLET ORAL DAILY
COMMUNITY
Start: 2022-01-19

## 2022-02-14 RX ORDER — METHYLPREDNISOLONE ACETATE 80 MG/ML
80 INJECTION, SUSPENSION INTRA-ARTICULAR; INTRALESIONAL; INTRAMUSCULAR; PARENTERAL; SOFT TISSUE ONCE
Status: CANCELLED | OUTPATIENT
Start: 2022-02-14 | End: 2022-02-14

## 2022-02-14 RX ORDER — LIDOCAINE HYDROCHLORIDE 10 MG/ML
10 INJECTION, SOLUTION EPIDURAL; INFILTRATION; INTRACAUDAL; PERINEURAL ONCE
Status: CANCELLED | OUTPATIENT
Start: 2022-02-14 | End: 2022-02-14

## 2022-02-14 NOTE — H&P (VIEW-ONLY)
Assessment  1  Lumbar facet arthropathy  -     Case request operating room: RHIZOTOMY LUMBAR L3, L4, L5; Standing  -     Case request operating room: RHIZOTOMY LUMBAR L3, L4, L5    Greater than 90% relief of pain with improved ability to participate with IADLs after bilateral L3, L4, L5 medial branch blocks #1 and #2  Previously described axial low back pain with aching, nagging, indolent, stabbing,, and characteristics  Positive facet loading maneuvers concordant with prominent lumbar facet arthropathy seen throughout lumbar spine on MRI  Risks, benefits and alternatives to lumbar medial branch blocks and subsequent radiofrequency ablation of successful thoroughly discussed with patient  Handouts provided questions answered to patient's satisfaction  Previously reported the following symptomatology:     Right low axial back pain in the area of right sacroiliac joint  Positive right-sided sacroiliac joint loading, positive Rony fingers test, positive Gaenslen's test   Aching, nagging, indolent, stabbing, throbbing pain  Additionally describes symptomatology of leg weakness and numbness that has been progressive  The pain resembles neurogenic claudication  No pertinent imaging available to review; however, pain with positive facet loading maneuvers bilaterally  5/5 strength in all extremities with active range of motion movements, negative SLR bilaterally  He has failed more than 12 weeks of conservative therapy including physical and chiropractic therapy  Reasonable at this time to proceed with multimodal pain therapy plan while obtaining MRI lumbar spine noncontrast to guide interventional therapy  Plan  -bilateral L3, L4, L5 medial branch nerve radiofrequency ablation; f/u 2 weeks post procedure  -gabapentin 300 mg t i d  previously ordered for patient; counseled regarding sedative effects of taking this medication and provided up titration calendar    Counseled not to take medication while driving or operating heavy machinery/using stairs  -DME TENS  -Meloxicam 7 5 mg b i d  prn pain previously prescribed  Patient educated regarding bleeding risk of taking this medication not taking any other nonsteroidal anti-inflammatory medications while taking this medication; counseled thoroughly regarding potential risk of Cardiovascular injury, Kidney injury, Gastrointestinal ulceration/bleeding  Patient voiced understanding  -physical therapy for right-sided lumbar radiculopathy, lumbar facet arthropathy; Core exercises additionally provided for physician directed home PT that the patient plans to participate with for 1 hour, twice a week for the next 6 weeks  There are risks associated with opioid medications, including dependence, addiction and tolerance  The patient understands and agrees to use these medications only as prescribed  Potential side effects of the medications include, but are not limited to, constipation, drowsiness, addiction, impaired judgment and risk of fatal overdose if not taken as prescribed  The patient was warned against driving while taking sedation medications  Sharing medications is a felony  At this point in time, the patient is showing no signs of addiction, abuse, diversion or suicidal ideation  [place] Prescription Drug Monitoring Program report was reviewed and was appropriate     Complete risks and benefits including bleeding, infection, tissue reaction, nerve injury and allergic reaction were discussed  The approach was demonstrated using models and literature was provided  Verbal and written consent was obtained  My impressions and treatment recommendations were discussed in detail with the patient who verbalized understanding and had no further questions  Discharge instructions were provided  I personally saw and examined the patient and I agree with the above discussed plan of care      New Medications Ordered This Visit   Medications    losartan (COZAAR) 50 mg tablet     Sig: Take 50 mg by mouth daily       History of Present Illness    Greater than 90% relief of pain with improved ability to participate with IADLs after bilateral L3, L4, L5 medial branch blocks #1 and #2  Previously described axial low back pain with aching, nagging, indolent, stabbing,, and characteristics  Positive facet loading maneuvers concordant with prominent lumbar facet arthropathy seen throughout lumbar spine on MRI  Risks, benefits and alternatives to lumbar medial branch blocks and subsequent radiofrequency ablation of successful thoroughly discussed with patient  Handouts provided questions answered to patient's satisfaction  Previously reported the following symptomatology:     Maryann Mora is a 54 y o  male with pmhx of migraines, HTN presenting with right low axial back pain described primarily arthritic features  He describes 8/10 right low back pain that is worse in the mornings and worse at the end of the day  The pain is characterized by achy, nagging, indolent, crampy, stabbing pain in his axial right low back in the region of the sacroiliac joint  The patient describes that the pain is worse with standing for long periods of time on hard surfaces as well as with walking  The patient is a very active individual and feels as though this pain compromises his  participation with independent activities of daily living  The pain can be debilitating at times and contribute to significant disability, compromising overall activity and independent activities of daily living  He has tried chiropractic and physical therapy with limited relief of symptoms  Medications the patient has tried in the past include ibuprofen with limited relief  He describes   radicular symptoms in the bilateral L4 and L5 dermatomes but otherwise has good strength  He endorses weakness numbness or paresthesias  The patient denies any bowel or bladder dysfunction as well   The patient works as a  and states that he has significant debilitation with independent activities of daily living and overall function  Additionally describes neck pain with arthritic features; pain with lateral rotation/extesion flexion  Chiropractic and PT did help with this pain in the past as have nsaids  I have personally reviewed and/or updated the patient's past medical history, past surgical history, family history, social history, current medications, allergies, and vital signs today  Review of Systems   Constitutional: Positive for activity change  HENT: Negative  Eyes: Negative  Respiratory: Negative  Cardiovascular: Negative  Gastrointestinal: Negative  Endocrine: Negative  Genitourinary: Negative  Musculoskeletal: Positive for arthralgias, back pain, gait problem and myalgias  Skin: Negative  Allergic/Immunologic: Negative  Neurological: Positive for weakness and numbness  Hematological: Negative  Psychiatric/Behavioral: Negative  All other systems reviewed and are negative        Patient Active Problem List   Diagnosis    Essential hypertension    Cellulitis of left lower extremity    Fever in adult       Past Medical History:   Diagnosis Date    Hypertension     Migraines        Past Surgical History:   Procedure Laterality Date    NERVE BLOCK Bilateral 12/28/2021    Procedure: BLOCK MEDIAL BRANCH Bilateral L3, L4, L5 #1;  Surgeon: Fabienne Corcoran MD;  Location: [facility];  Service: Pain Management     NERVE BLOCK Bilateral 1/20/2022    Procedure: BLOCK MEDIAL BRANCH Bilateral L3, L4, L5 #2;  Surgeon: Fabienne Corcoran MD;  Location: [facility];  Service: Pain Management     NM INJECTION,SACROILIAC JOINT Right 12/2/2021    Procedure: Interlaminar epidural steroid injection at L4-L5 ;  Surgeon: Fabienne Corcoran MD;  Location: [facility];  Service: Pain Management     SHOULDER SURGERY Left        Family History   Problem Relation Age of Onset    Diabetes Mother     Migraines Mother     Hypertension Father        Social History     Occupational History    Not on file   Tobacco Use    Smoking status: Former Smoker     Packs/day: 1 00     Years: 10 00     Pack years: 10      Types: Cigarettes     Quit date: 2000     Years since quittin 7    Smokeless tobacco: Never Used   Vaping Use    Vaping Use: Never used   Substance and Sexual Activity    Alcohol use: Not Currently    Drug use: Yes     Types: Marijuana     Comment: Occasional marijuana    Sexual activity: Not on file       Current Outpatient Medications on File Prior to Visit   Medication Sig    cholecalciferol (VITAMIN D3) 400 units tablet Take 400 Units by mouth daily    Diclofenac Sodium (VOLTAREN) 1 % Apply to affected area twice a day as needed for pain    fish oil-omega-3 fatty acids 1000 MG capsule Take 1 capsule by mouth    gabapentin (NEURONTIN) 300 mg capsule Take 1 capsule (300 mg total) by mouth 3 (three) times a day    glucosamine-chondroitin 500-400 MG tablet Take 1 tablet by mouth    hydrochlorothiazide (HYDRODIURIL) 50 mg tablet Take 50 mg by mouth daily    losartan (COZAAR) 50 mg tablet Take 50 mg by mouth daily    magnesium 30 MG tablet Take 200 mg by mouth 2 (two) times a day    meloxicam (MOBIC) 7 5 mg tablet TAKE 1 TABLET (7 5 MG TOTAL) BY MOUTH 2 (TWO) TIMES A DAY AS NEEDED FOR MODERATE PAIN    Nerve Stimulator (TENS Therapy Pain Relief) SASHA Use 1 application 2 (two) times a day as needed (moderate pain)    SUMAtriptan (IMITREX) 25 mg tablet     [DISCONTINUED] losartan (COZAAR) 25 mg tablet      No current facility-administered medications on file prior to visit         Allergies   Allergen Reactions    Penicillins Hives     Hives           Physical Exam    /92   Pulse 66   Temp 97 6 °F (36 4 °C)   Resp 20   Ht 5' 11" (1 803 m)   Wt 95 4 kg (210 lb 6 4 oz)   BMI 29 34 kg/m²     Constitutional: normal, well developed, well nourished, alert, in no distress and non-toxic and no overt pain behavior  and overweight  Eyes: anicteric  HEENT: grossly intact  Neck: supple, symmetric, trachea midline and no masses   Pulmonary:even and unlabored  Cardiovascular:No edema or pitting edema present  Skin:Normal without rashes or lesions and well hydrated  Psychiatric:Mood and affect appropriate  Neurologic:Cranial Nerves II-XII grossly intact Sensation grossly intact; no clonus negative rodas's  Reflexes 2+ and brisk  SLR negative bilaterally  Musculoskeletal:normal gait  5/5 strength in all extremities with active range of motion movements bilaterally  Normal heel toe and tip toe walking  pain with positive facet loading maneuvers bilaterally and with lateral spine rotation  No ttp over lumbar paraspinal muscles  Positive right-sided sacroiliac joint loading, positive Rony fingers test, positive Gaenslen's test       Imaging    MRI LUMBAR SPINE WITHOUT CONTRAST     INDICATION: [id number]: Radiculopathy, lumbar region  Low back pain with numbness into the left leg      COMPARISON:  None      TECHNIQUE:  Sagittal T1, sagittal T2, sagittal inversion recovery, axial T1 and axial T2, coronal T2     IMAGE QUALITY:  Diagnostic     FINDINGS:     VERTEBRAL BODIES:  There are 5 lumbar type vertebral bodies  Levoscoliosis apex L1-2  Slight retrolisthesis L1-2 also noted  Modic type I degenerative marrow signal L1-2 eccentric to the right      SACRUM:  Normal signal within the sacrum   No evidence of insufficiency or stress fracture      DISTAL CORD AND CONUS:  Normal size and signal within the distal cord and conus      PARASPINAL SOFT TISSUES:  Paraspinal soft tissues are unremarkable      LOWER THORACIC DISC SPACES:  Normal disc height and signal   No disc herniation, canal stenosis or foraminal narrowing      LUMBAR DISC SPACES:     L1-L2:  Degenerative disc osteophyte complex with marginal osteophytes noted with a superimposed right paramedian protrusion type disc herniation  There is moderate right lateral recess stenosis  Correlate for right L2 radiculopathy  Severe right and   mild left foraminal narrowing with mild central stenosis      L2-L3:  Degenerative disc osteophyte complex with marginal osteophytes results in mild bilateral foraminal narrowing and mild central stenosis  Mild/moderate left lateral recess stenosis      L3-L4:  Mild annular bulge small marginal osteophytes result in moderate right and mild to moderate left foraminal narrowing with mild central stenosis      L4-L5:  Moderate to severe left and mild right facet hypertrophy identified  There is a disc osteophyte complex with marginal osteophytes resulting in moderate to severe left foraminal narrowing  Correlate for left L4 radiculopathy  Mild central   stenosis and moderate left lateral recess stenosis noted      L5-S1:  Moderate facet hypertrophy identified  There is annular bulging and small marginal osteophytes resulting in mild right foraminal narrowing and minimal central stenosis      IMPRESSION:     Spondylotic degenerative changes are seen throughout the lumbar spine  There is right lateral recess stenosis at L1-2 secondary to right paramedian protrusion type disc herniation with marginal osteophyte contributing to severe right foraminal   narrowing  Choroid for right L1 or right 2 radiculopathy      Severe left foraminal narrowing noted at L4-5 secondary to spondylotic degenerative changes  Correlate for left L4 radiculopathy      Levoscoliosis noted with its apex at L1-2  Degenerative changes are noted at remaining lumbar levels as described

## 2022-02-14 NOTE — PROGRESS NOTES
Assessment  1  Lumbar facet arthropathy  -     Case request operating room: RHIZOTOMY LUMBAR L3, L4, L5; Standing  -     Case request operating room: RHIZOTOMY LUMBAR L3, L4, L5    Greater than 90% relief of pain with improved ability to participate with IADLs after bilateral L3, L4, L5 medial branch blocks #1 and #2  Previously described axial low back pain with aching, nagging, indolent, stabbing,, and characteristics  Positive facet loading maneuvers concordant with prominent lumbar facet arthropathy seen throughout lumbar spine on MRI  Risks, benefits and alternatives to lumbar medial branch blocks and subsequent radiofrequency ablation of successful thoroughly discussed with patient  Handouts provided questions answered to patient's satisfaction  Previously reported the following symptomatology:     Right low axial back pain in the area of right sacroiliac joint  Positive right-sided sacroiliac joint loading, positive Rony fingers test, positive Gaenslen's test   Aching, nagging, indolent, stabbing, throbbing pain  Additionally describes symptomatology of leg weakness and numbness that has been progressive  The pain resembles neurogenic claudication  No pertinent imaging available to review; however, pain with positive facet loading maneuvers bilaterally  5/5 strength in all extremities with active range of motion movements, negative SLR bilaterally  He has failed more than 12 weeks of conservative therapy including physical and chiropractic therapy  Reasonable at this time to proceed with multimodal pain therapy plan while obtaining MRI lumbar spine noncontrast to guide interventional therapy  Plan  -bilateral L3, L4, L5 medial branch nerve radiofrequency ablation; f/u 2 weeks post procedure  -gabapentin 300 mg t i d  previously ordered for patient; counseled regarding sedative effects of taking this medication and provided up titration calendar    Counseled not to take medication while driving or operating heavy machinery/using stairs  -DME TENS  -Meloxicam 7 5 mg b i d  prn pain previously prescribed  Patient educated regarding bleeding risk of taking this medication not taking any other nonsteroidal anti-inflammatory medications while taking this medication; counseled thoroughly regarding potential risk of Cardiovascular injury, Kidney injury, Gastrointestinal ulceration/bleeding  Patient voiced understanding  -physical therapy for right-sided lumbar radiculopathy, lumbar facet arthropathy; Core exercises additionally provided for physician directed home PT that the patient plans to participate with for 1 hour, twice a week for the next 6 weeks  There are risks associated with opioid medications, including dependence, addiction and tolerance  The patient understands and agrees to use these medications only as prescribed  Potential side effects of the medications include, but are not limited to, constipation, drowsiness, addiction, impaired judgment and risk of fatal overdose if not taken as prescribed  The patient was warned against driving while taking sedation medications  Sharing medications is a felony  At this point in time, the patient is showing no signs of addiction, abuse, diversion or suicidal ideation  [place] Prescription Drug Monitoring Program report was reviewed and was appropriate     Complete risks and benefits including bleeding, infection, tissue reaction, nerve injury and allergic reaction were discussed  The approach was demonstrated using models and literature was provided  Verbal and written consent was obtained  My impressions and treatment recommendations were discussed in detail with the patient who verbalized understanding and had no further questions  Discharge instructions were provided  I personally saw and examined the patient and I agree with the above discussed plan of care      New Medications Ordered This Visit   Medications    losartan (COZAAR) 50 mg tablet     Sig: Take 50 mg by mouth daily       History of Present Illness    Greater than 90% relief of pain with improved ability to participate with IADLs after bilateral L3, L4, L5 medial branch blocks #1 and #2  Previously described axial low back pain with aching, nagging, indolent, stabbing,, and characteristics  Positive facet loading maneuvers concordant with prominent lumbar facet arthropathy seen throughout lumbar spine on MRI  Risks, benefits and alternatives to lumbar medial branch blocks and subsequent radiofrequency ablation of successful thoroughly discussed with patient  Handouts provided questions answered to patient's satisfaction  Previously reported the following symptomatology:     Ambrocio Lester is a 54 y o  male with pmhx of migraines, HTN presenting with right low axial back pain described primarily arthritic features  He describes 8/10 right low back pain that is worse in the mornings and worse at the end of the day  The pain is characterized by achy, nagging, indolent, crampy, stabbing pain in his axial right low back in the region of the sacroiliac joint  The patient describes that the pain is worse with standing for long periods of time on hard surfaces as well as with walking  The patient is a very active individual and feels as though this pain compromises his  participation with independent activities of daily living  The pain can be debilitating at times and contribute to significant disability, compromising overall activity and independent activities of daily living  He has tried chiropractic and physical therapy with limited relief of symptoms  Medications the patient has tried in the past include ibuprofen with limited relief  He describes   radicular symptoms in the bilateral L4 and L5 dermatomes but otherwise has good strength  He endorses weakness numbness or paresthesias  The patient denies any bowel or bladder dysfunction as well   The patient works as a  and states that he has significant debilitation with independent activities of daily living and overall function  Additionally describes neck pain with arthritic features; pain with lateral rotation/extesion flexion  Chiropractic and PT did help with this pain in the past as have nsaids  I have personally reviewed and/or updated the patient's past medical history, past surgical history, family history, social history, current medications, allergies, and vital signs today  Review of Systems   Constitutional: Positive for activity change  HENT: Negative  Eyes: Negative  Respiratory: Negative  Cardiovascular: Negative  Gastrointestinal: Negative  Endocrine: Negative  Genitourinary: Negative  Musculoskeletal: Positive for arthralgias, back pain, gait problem and myalgias  Skin: Negative  Allergic/Immunologic: Negative  Neurological: Positive for weakness and numbness  Hematological: Negative  Psychiatric/Behavioral: Negative  All other systems reviewed and are negative        Patient Active Problem List   Diagnosis    Essential hypertension    Cellulitis of left lower extremity    Fever in adult       Past Medical History:   Diagnosis Date    Hypertension     Migraines        Past Surgical History:   Procedure Laterality Date    NERVE BLOCK Bilateral 12/28/2021    Procedure: BLOCK MEDIAL BRANCH Bilateral L3, L4, L5 #1;  Surgeon: Qamar Dodge MD;  Location:  [facility];  Service: Pain Management     NERVE BLOCK Bilateral 1/20/2022    Procedure: BLOCK MEDIAL BRANCH Bilateral L3, L4, L5 #2;  Surgeon: Qamar Dodge MD;  Location:  [facility];  Service: Pain Management     NJ INJECTION,SACROILIAC JOINT Right 12/2/2021    Procedure: Interlaminar epidural steroid injection at L4-L5 ;  Surgeon: Qamar oDdge MD;  Location:  [facility];  Service: Pain Management     SHOULDER SURGERY Left        Family History   Problem Relation Age of Onset    Diabetes Mother     Migraines Mother     Hypertension Father        Social History     Occupational History    Not on file   Tobacco Use    Smoking status: Former Smoker     Packs/day: 1 00     Years: 10 00     Pack years: 10      Types: Cigarettes     Quit date: 2000     Years since quittin 7    Smokeless tobacco: Never Used   Vaping Use    Vaping Use: Never used   Substance and Sexual Activity    Alcohol use: Not Currently    Drug use: Yes     Types: Marijuana     Comment: Occasional marijuana    Sexual activity: Not on file       Current Outpatient Medications on File Prior to Visit   Medication Sig    cholecalciferol (VITAMIN D3) 400 units tablet Take 400 Units by mouth daily    Diclofenac Sodium (VOLTAREN) 1 % Apply to affected area twice a day as needed for pain    fish oil-omega-3 fatty acids 1000 MG capsule Take 1 capsule by mouth    gabapentin (NEURONTIN) 300 mg capsule Take 1 capsule (300 mg total) by mouth 3 (three) times a day    glucosamine-chondroitin 500-400 MG tablet Take 1 tablet by mouth    hydrochlorothiazide (HYDRODIURIL) 50 mg tablet Take 50 mg by mouth daily    losartan (COZAAR) 50 mg tablet Take 50 mg by mouth daily    magnesium 30 MG tablet Take 200 mg by mouth 2 (two) times a day    meloxicam (MOBIC) 7 5 mg tablet TAKE 1 TABLET (7 5 MG TOTAL) BY MOUTH 2 (TWO) TIMES A DAY AS NEEDED FOR MODERATE PAIN    Nerve Stimulator (TENS Therapy Pain Relief) SASHA Use 1 application 2 (two) times a day as needed (moderate pain)    SUMAtriptan (IMITREX) 25 mg tablet     [DISCONTINUED] losartan (COZAAR) 25 mg tablet      No current facility-administered medications on file prior to visit         Allergies   Allergen Reactions    Penicillins Hives     Hives           Physical Exam    /92   Pulse 66   Temp 97 6 °F (36 4 °C)   Resp 20   Ht 5' 11" (1 803 m)   Wt 95 4 kg (210 lb 6 4 oz)   BMI 29 34 kg/m²     Constitutional: normal, well developed, well nourished, alert, in no distress and non-toxic and no overt pain behavior  and overweight  Eyes: anicteric  HEENT: grossly intact  Neck: supple, symmetric, trachea midline and no masses   Pulmonary:even and unlabored  Cardiovascular:No edema or pitting edema present  Skin:Normal without rashes or lesions and well hydrated  Psychiatric:Mood and affect appropriate  Neurologic:Cranial Nerves II-XII grossly intact Sensation grossly intact; no clonus negative rodas's  Reflexes 2+ and brisk  SLR negative bilaterally  Musculoskeletal:normal gait  5/5 strength in all extremities with active range of motion movements bilaterally  Normal heel toe and tip toe walking  pain with positive facet loading maneuvers bilaterally and with lateral spine rotation  No ttp over lumbar paraspinal muscles  Positive right-sided sacroiliac joint loading, positive Rony fingers test, positive Gaenslen's test       Imaging    MRI LUMBAR SPINE WITHOUT CONTRAST     INDICATION: M54 16: Radiculopathy, lumbar region  Low back pain with numbness into the left leg      COMPARISON:  None      TECHNIQUE:  Sagittal T1, sagittal T2, sagittal inversion recovery, axial T1 and axial T2, coronal T2     IMAGE QUALITY:  Diagnostic     FINDINGS:     VERTEBRAL BODIES:  There are 5 lumbar type vertebral bodies  Levoscoliosis apex L1-2  Slight retrolisthesis L1-2 also noted  Modic type I degenerative marrow signal L1-2 eccentric to the right      SACRUM:  Normal signal within the sacrum   No evidence of insufficiency or stress fracture      DISTAL CORD AND CONUS:  Normal size and signal within the distal cord and conus      PARASPINAL SOFT TISSUES:  Paraspinal soft tissues are unremarkable      LOWER THORACIC DISC SPACES:  Normal disc height and signal   No disc herniation, canal stenosis or foraminal narrowing      LUMBAR DISC SPACES:     L1-L2:  Degenerative disc osteophyte complex with marginal osteophytes noted with a superimposed right paramedian protrusion type disc herniation  There is moderate right lateral recess stenosis  Correlate for right L2 radiculopathy  Severe right and   mild left foraminal narrowing with mild central stenosis      L2-L3:  Degenerative disc osteophyte complex with marginal osteophytes results in mild bilateral foraminal narrowing and mild central stenosis  Mild/moderate left lateral recess stenosis      L3-L4:  Mild annular bulge small marginal osteophytes result in moderate right and mild to moderate left foraminal narrowing with mild central stenosis      L4-L5:  Moderate to severe left and mild right facet hypertrophy identified  There is a disc osteophyte complex with marginal osteophytes resulting in moderate to severe left foraminal narrowing  Correlate for left L4 radiculopathy  Mild central   stenosis and moderate left lateral recess stenosis noted      L5-S1:  Moderate facet hypertrophy identified  There is annular bulging and small marginal osteophytes resulting in mild right foraminal narrowing and minimal central stenosis      IMPRESSION:     Spondylotic degenerative changes are seen throughout the lumbar spine  There is right lateral recess stenosis at L1-2 secondary to right paramedian protrusion type disc herniation with marginal osteophyte contributing to severe right foraminal   narrowing  Choroid for right L1 or right 2 radiculopathy      Severe left foraminal narrowing noted at L4-5 secondary to spondylotic degenerative changes  Correlate for left L4 radiculopathy      Levoscoliosis noted with its apex at L1-2  Degenerative changes are noted at remaining lumbar levels as described

## 2022-02-14 NOTE — PATIENT INSTRUCTIONS
Lumbar Radiofrequency Ablation   WHAT YOU NEED TO KNOW:   What do I need to know about lumbar radiofrequency ablation? Lumbar radiofrequency ablation (RFA) is a procedure used to treat facet joint pain in your lower back  Facet joints are found at the back of each vertebra  A needle electrode is used to send electrical currents to the nerves in your facet joint  The electrical currents create heat that damages the nerve so it cannot send pain signals  How do I prepare for lumbar RFA? Your healthcare provider will talk to you about how to prepare for this procedure  He may tell you not to eat or drink anything after midnight on the day of your procedure  He will tell you what medicines to take or not take on the day of your procedure  What will happen during lumbar RFA? · You will lie on your stomach  You will be given local anesthesia to numb the area of your back where the needle electrode will be inserted  You may be given a sedative to help keep you relaxed  You may still feel pressure or pushing during the procedure, but you should not feel any pain  Your healthcare provider will use fluoroscopy (a type of x-ray) to guide the needle electrode to the nerves near your facet joint  · Your healthcare provider may touch the affected nerve to make sure the needle electrode is in the right place  You will feel tingling or pressure when he does this  He will then apply local anesthesia to the nerve to numb it  This will prevent you from feeling pain when he applies heat to the nerve  Your healthcare provider will then apply heat to the nerve using the needle electrode  He may need to apply heat to more than one nerve  He will remove the needle electrode and apply a bandage over the area  What are the risks of lumbar RFA? You may have pain, numbness, tingling, or burning in the area where the lumbar RFA was done  These normally go away within 6 weeks  The needle electrode may injure your spinal nerves  This may cause permanent leg weakness or nerve pain  CARE AGREEMENT:   You have the right to help plan your care  Learn about your health condition and how it may be treated  Discuss treatment options with your healthcare providers to decide what care you want to receive  You always have the right to refuse treatment  The above information is an  only  It is not intended as medical advice for individual conditions or treatments  Talk to your doctor, nurse or pharmacist before following any medical regimen to see if it is safe and effective for you  © Copyright [employer] 2021 Information is for End User's use only and may not be sold, redistributed or otherwise used for commercial purposes   All illustrations and images included in CareNotes® are the copyrighted property of [employer]  or [address] [place] [address]

## 2022-03-01 ENCOUNTER — HOSPITAL ENCOUNTER (OUTPATIENT)
Facility: HOSPITAL | Age: 52
Setting detail: OUTPATIENT SURGERY
Discharge: HOME | End: 2022-03-01
Attending: ANESTHESIOLOGY | Admitting: ANESTHESIOLOGY
Payer: COMMERCIAL

## 2022-03-01 ENCOUNTER — APPOINTMENT (OUTPATIENT)
Dept: RADIOLOGY | Facility: HOSPITAL | Age: 52
End: 2022-03-01
Payer: COMMERCIAL

## 2022-03-01 ENCOUNTER — TELEPHONE (OUTPATIENT)
Dept: RADIOLOGY | Facility: CLINIC | Age: 52
End: 2022-03-01

## 2022-03-01 VITALS
HEIGHT: 71 IN | HEART RATE: 63 BPM | RESPIRATION RATE: 18 BRPM | TEMPERATURE: 98.4 F | DIASTOLIC BLOOD PRESSURE: 88 MMHG | WEIGHT: 210 LBS | BODY MASS INDEX: 29.4 KG/M2 | OXYGEN SATURATION: 100 % | SYSTOLIC BLOOD PRESSURE: 115 MMHG

## 2022-03-01 DIAGNOSIS — R52 PAIN: ICD-10-CM

## 2022-03-01 PROCEDURE — 64635 DESTROY LUMB/SAC FACET JNT: CPT | Performed by: ANESTHESIOLOGY

## 2022-03-01 PROCEDURE — 64636 DESTROY L/S FACET JNT ADDL: CPT | Performed by: ANESTHESIOLOGY

## 2022-03-01 RX ORDER — LIDOCAINE HYDROCHLORIDE 10 MG/ML
10 INJECTION, SOLUTION EPIDURAL; INFILTRATION; INTRACAUDAL; PERINEURAL ONCE
Status: COMPLETED | OUTPATIENT
Start: 2022-03-01 | End: 2022-03-01

## 2022-03-01 RX ORDER — BUPIVACAINE HCL/PF 2.5 MG/ML
5 VIAL (ML) INJECTION ONCE
Status: COMPLETED | OUTPATIENT
Start: 2022-03-01 | End: 2022-03-01

## 2022-03-01 RX ORDER — METHYLPREDNISOLONE ACETATE 80 MG/ML
80 INJECTION, SUSPENSION INTRA-ARTICULAR; INTRALESIONAL; INTRAMUSCULAR; PARENTERAL; SOFT TISSUE ONCE
Status: COMPLETED | OUTPATIENT
Start: 2022-03-01 | End: 2022-03-01

## 2022-03-01 RX ORDER — ALPRAZOLAM 0.5 MG/1
0.5 TABLET ORAL ONCE
Status: COMPLETED | OUTPATIENT
Start: 2022-03-01 | End: 2022-03-01

## 2022-03-01 RX ORDER — OXYCODONE HYDROCHLORIDE AND ACETAMINOPHEN 5; 325 MG/1; MG/1
1 TABLET ORAL ONCE
Status: COMPLETED | OUTPATIENT
Start: 2022-03-01 | End: 2022-03-01

## 2022-03-01 RX ADMIN — OXYCODONE HYDROCHLORIDE AND ACETAMINOPHEN 1 TABLET: 5; 325 TABLET ORAL at 08:15

## 2022-03-01 RX ADMIN — ALPRAZOLAM 0.5 MG: 0.5 TABLET ORAL at 08:14

## 2022-03-01 NOTE — INTERVAL H&P NOTE
H&P reviewed  After examining the patient I find no changes in the patients condition since the H&P had been written      Vitals:    03/01/22 0814   BP: 135/62   Pulse: 70   Resp: 20   Temp: 97 9 °F (36 6 °C)   SpO2: 97%

## 2022-03-01 NOTE — PROCEDURES
Pre-procedure Diagnosis: Lumbar facet arthropathy  Post-procedure Diagnosis: Lumbar facet arthropathy  Operation Title(s):  1  Radiofrequency ablation of [BILATERAL] L3, L4, L5 medial branch nerves      2  Intraoperative fluoroscopy  Attending Surgeon:   Fabienne Corcoran MD  Anesthesia:   Local    Indications: The patient is a 54y o  year-old male with a diagnosis of lumbar facet arthropathy  The patient's history and physical exam were reviewed  The patient has previously undergone diagnostic and confirmatory lumbar medial branch blocks  Fluoroscopy is being used for the precise placement of the needles at the lumbar medial branch nerves  The risks, benefits and alternatives to the procedure were discussed, and all questions were answered to the patient's satisfaction  The patient agreed to proceed, and written informed consent was obtained  Procedure in Detail: The patient was brought into the procedure room and placed in the prone position on the fluoroscopy table  The low back and upper buttock were prepped with chloraprep times two and draped in a sterile manner  AP fluoroscopy was used to identify the lumbar levels on the [LEFT] side  The fluoroscope beam was then obliqued to better visualize the junction of the superior articular process and transverse process on the [LEFT] side and then tilted caudally about 25 degrees  An 18-gauge, 100mm length, 10mm curved active tip radiofrequency probe was advanced toward the targeted points until bone was contacted  Multiple fluoroscopic views were made to ensure placement of the needle tip at the appropriate location of the medial branch nerve  Motor stimulation was performed at 2 Hz and 1 2 volts generating a twitch in the paraspinal muscles with no motor activity in the lower extremities  Next, AP fluoroscopy was used to identify the L5-S1 level  The fluoroscope been was tilted cephalad to visualize the sacral ala   The fluoroscope beam was then tilted about 45 degrees from that point and the skin and subcutaneous tissues in these identified areas were anesthetized with 1% lidocaine  An 18-gauge, 100mm length, 10mm curved active tip radiofrequency probe was advanced toward the targeted points until bone was contacted  Motor stimulation was performed at 2 Hz and 1 2 volts generating a twitch in the paraspinal muscles with no motor activity in the lower extremities  0 5ml of 2% lidocaine was injected prior to lesioning, which was performed for 90 seconds at 80 degrees centigrade  The lesion was repeated once more after slight repositioning of the needles on an oblique view  Once the lesions were complete, 1ml of a solution consisting of 5mL 0 25% bupivacaine and 1 mL Dexamethasone (10mg/mL) was injected through each needle and then removed with a 1% lidocaine flush  The patient's back was cleaned, and bandages were placed at the needle insertion site  The same procedure was repeated at the lumbar levels on the [RIGHT] side    Disposition: The patient tolerated the procedure well and there were no apparent complications  Vital signs remained stable throughout the procedure  The patient was taken to the recovery area where written discharge instructions for the procedure were given      Estimated Blood Loss: None  Specimens Obtained: N/A

## 2022-03-01 NOTE — DISCHARGE INSTRUCTIONS
PLEASE SCHEDULE 2 WEEK FOLLOW UP BY CALLING THE [facility] AT [facility]: [phone]    Lumbar Radiofrequency Ablation   WHAT YOU NEED TO KNOW:   Lumbar radiofrequency ablation (RFA) is a procedure used to treat facet joint pain in your lower back  Facet joints are found at the back of each vertebra  A needle electrode is used to send electrical currents to the nerves in your facet joint  The electrical currents create heat that damages the nerve so it cannot send pain signals  DISCHARGE INSTRUCTIONS:   Seek care immediately if:   · You cannot move your leg  · You cannot control your urine or bowel movements  · You have severe pain in your lower back  Contact your healthcare provider if:   · You have leg weakness  · You develop new symptoms  · You have questions or concerns about your condition or care  Medicines:   · Pain medicine  may be given  Ask how to take this medicine safely  · Take your medicine as directed  Contact your healthcare provider if you think your medicine is not helping or if you have side effects  Tell him or her if you are allergic to any medicine  Keep a list of the medicines, vitamins, and herbs you take  Include the amounts, and when and why you take them  Bring the list or the pill bottles to follow-up visits  Carry your medicine list with you in case of an emergency  Follow up with your healthcare provider as directed:  Write down your questions so you remember to ask them during your visits  Activity:  Do not drive a car or operate machinery within 24 hours after your procedure  Ask your healthcare provider about any other activities you should avoid  © Copyright [employer] 2022 Information is for End User's use only and may not be sold, redistributed or otherwise used for commercial purposes   All illustrations and images included in CareNotes® are the copyrighted property of [employer]  or Carrillo Munoz  The above information is an  only  It is not intended as medical advice for individual conditions or treatments  Talk to your doctor, nurse or pharmacist before following any medical regimen to see if it is safe and effective for you

## 2022-03-01 NOTE — OP NOTE
OPERATIVE REPORT  PATIENT NAME: Renny Jon    :  1970  MRN: [id number]  Pt Location:  GI ROOM 01    SURGERY DATE: 3/1/2022    Surgeon(s) and Role: Justin Dalton MD - Primary    Preop Diagnosis:  Lumbar facet arthropathy [M47 816]    Post-Op Diagnosis Codes:     * Lumbar facet arthropathy [M47 816]    Procedure(s) (LRB):  RHIZOTOMY LUMBAR L3, L4, L5 (Bilateral)    Specimen(s):  * No specimens in log *    Estimated Blood Loss:   Minimal    Drains:  * No LDAs found *    Anesthesia Type:   Local    Operative Indications:  Lumbar facet arthropathy [M47 816]    Operative Findings:  Bilateral L3, L4, L5 medial branch nerve regions identified under fluoroscopic guidance  Appropriate motor testing performed prior to radiofrequency lesioning of medial branch nerves      Complications:   None    Procedure and Technique:  Please see detailed procedure note     I was present for the entire procedure    Patient Disposition:  PACU       SIGNATURE: Isis Dougherty MD  DATE: 2022  TIME: 9:24 AM

## 2022-03-02 NOTE — TELEPHONE ENCOUNTER
Pt called back and explained the below  Pt states everything is going well and nothing out of the ordinary    Confirmed appt on 3/16

## 2022-03-16 ENCOUNTER — OFFICE VISIT (OUTPATIENT)
Dept: PAIN MEDICINE | Facility: CLINIC | Age: 52
End: 2022-03-16
Payer: COMMERCIAL

## 2022-03-16 VITALS
RESPIRATION RATE: 20 BRPM | SYSTOLIC BLOOD PRESSURE: 118 MMHG | HEART RATE: 69 BPM | WEIGHT: 207.8 LBS | DIASTOLIC BLOOD PRESSURE: 64 MMHG | TEMPERATURE: 98.3 F | BODY MASS INDEX: 29.09 KG/M2 | HEIGHT: 71 IN

## 2022-03-16 DIAGNOSIS — M54.16 LUMBAR RADICULOPATHY: ICD-10-CM

## 2022-03-16 PROCEDURE — 99214 OFFICE O/P EST MOD 30 MIN: CPT | Performed by: ANESTHESIOLOGY

## 2022-03-16 RX ORDER — MELOXICAM 7.5 MG/1
7.5 TABLET ORAL 2 TIMES DAILY PRN
Qty: 180 TABLET | Refills: 0 | Status: SHIPPED | OUTPATIENT
Start: 2022-03-16 | End: 2022-06-20

## 2022-03-16 RX ORDER — GABAPENTIN 300 MG/1
300 CAPSULE ORAL 3 TIMES DAILY
Qty: 270 CAPSULE | Refills: 0 | Status: SHIPPED | OUTPATIENT
Start: 2022-03-16

## 2022-03-16 NOTE — LETTER
March 16, 2022     DO Jozef Maria [address] [place] [address]    Patient: Erick Dean   YOB: 1970   Date of Visit: 3/16/2022       Dear Dr Urbano Ortiz: Thank you for referring Harshal Montiel to me for evaluation  Below are my notes for this consultation  If you have questions, please do not hesitate to call me  I look forward to following your patient along with you  Sincerely,        Zoila Pisano MD        CC: No Recipients  Zoila Pisano MD  3/16/2022  2:07 PM  Signed      Assessment  1  Lumbar radiculopathy  -     meloxicam (MOBIC) 7 5 mg tablet; Take 1 tablet (7 5 mg total) by mouth 2 (two) times a day as needed for moderate pain  -     gabapentin (NEURONTIN) 300 mg capsule; Take 1 capsule (300 mg total) by mouth 3 (three) times a day    Greater than 90% relief of pain with improved ability to participate with IADLs after bilateral L3, L4, L5 medial branch blocks #1 and #2 and subsequent radiofrequency ablation  Previously described axial low back pain with aching, nagging, indolent, stabbing,, and characteristics  Positive facet loading maneuvers concordant with prominent lumbar facet arthropathy seen throughout lumbar spine on MRI  Risks, benefits and alternatives to lumbar medial branch blocks and subsequent radiofrequency ablation of successful thoroughly discussed with patient  Handouts provided questions answered to patient's satisfaction  Previously reported the following symptomatology:     Right low axial back pain in the area of right sacroiliac joint  Positive right-sided sacroiliac joint loading, positive Rony fingers test, positive Gaenslen's test   Aching, nagging, indolent, stabbing, throbbing pain  Additionally describes symptomatology of leg weakness and numbness that has been progressive  The pain resembles neurogenic claudication  No pertinent imaging available to review; however, pain with positive facet loading maneuvers bilaterally  5/5 strength in all extremities with active range of motion movements, negative SLR bilaterally  He has failed more than 12 weeks of conservative therapy including physical and chiropractic therapy  Reasonable at this time to proceed with multimodal pain therapy plan while obtaining MRI lumbar spine noncontrast to guide interventional therapy  Plan  -f/u prn or in 3 months for med check  -gabapentin 300 mg t i d  previously ordered for patient; may taper  counseled regarding sedative effects of taking this medication and provided up titration calendar  Counseled not to take medication while driving or operating heavy machinery/using stairs  -DME TENS  -Meloxicam 7 5 mg b i d  prn pain previously prescribed  Patient takes infrequently, 1-2x a week; counseled to let us know if he takes it every day so gi ppx can be written  Patient educated regarding bleeding risk of taking this medication not taking any other nonsteroidal anti-inflammatory medications while taking this medication; counseled thoroughly regarding potential risk of Cardiovascular injury, Kidney injury, Gastrointestinal ulceration/bleeding  Patient voiced understanding  -physical therapy for right-sided lumbar radiculopathy, lumbar facet arthropathy; Core exercises additionally provided for physician directed home PT that the patient plans to participate with for 1 hour, twice a week for the next 6 weeks  There are risks associated with opioid medications, including dependence, addiction and tolerance  The patient understands and agrees to use these medications only as prescribed  Potential side effects of the medications include, but are not limited to, constipation, drowsiness, addiction, impaired judgment and risk of fatal overdose if not taken as prescribed  The patient was warned against driving while taking sedation medications  Sharing medications is a felony   At this point in time, the patient is showing no signs of addiction, abuse, diversion or suicidal ideation  [place] Prescription Drug Monitoring Program report was reviewed and was appropriate     Complete risks and benefits including bleeding, infection, tissue reaction, nerve injury and allergic reaction were discussed  The approach was demonstrated using models and literature was provided  Verbal and written consent was obtained  My impressions and treatment recommendations were discussed in detail with the patient who verbalized understanding and had no further questions  Discharge instructions were provided  I personally saw and examined the patient and I agree with the above discussed plan of care  New Medications Ordered This Visit   Medications    meloxicam (MOBIC) 7 5 mg tablet     Sig: Take 1 tablet (7 5 mg total) by mouth 2 (two) times a day as needed for moderate pain     Dispense:  180 tablet     Refill:  0    gabapentin (NEURONTIN) 300 mg capsule     Sig: Take 1 capsule (300 mg total) by mouth 3 (three) times a day     Dispense:  270 capsule     Refill:  0       History of Present Illness    Greater than 90% relief of pain with improved ability to participate with IADLs after bilateral L3, L4, L5 medial branch blocks #1 and #2 and subsequent radiofrequency ablation  Previously described axial low back pain with aching, nagging, indolent, stabbing,, and characteristics  Positive facet loading maneuvers concordant with prominent lumbar facet arthropathy seen throughout lumbar spine on MRI  Risks, benefits and alternatives to lumbar medial branch blocks and subsequent radiofrequency ablation of successful thoroughly discussed with patient  Handouts provided questions answered to patient's satisfaction  Previously reported the following symptomatology:     Devon Wells is a 54 y o  male with pmhx of migraines, HTN presenting with right low axial back pain described primarily arthritic features   He describes 8/10 right low back pain that is worse in the mornings and worse at the end of the day  The pain is characterized by achy, nagging, indolent, crampy, stabbing pain in his axial right low back in the region of the sacroiliac joint  The patient describes that the pain is worse with standing for long periods of time on hard surfaces as well as with walking  The patient is a very active individual and feels as though this pain compromises his  participation with independent activities of daily living  The pain can be debilitating at times and contribute to significant disability, compromising overall activity and independent activities of daily living  He has tried chiropractic and physical therapy with limited relief of symptoms  Medications the patient has tried in the past include ibuprofen with limited relief  He describes   radicular symptoms in the bilateral L4 and L5 dermatomes but otherwise has good strength  He endorses weakness numbness or paresthesias  The patient denies any bowel or bladder dysfunction as well  The patient works as a  and states that he has significant debilitation with independent activities of daily living and overall function  Additionally describes neck pain with arthritic features; pain with lateral rotation/extesion flexion  Chiropractic and PT did help with this pain in the past as have nsaids  I have personally reviewed and/or updated the patient's past medical history, past surgical history, family history, social history, current medications, allergies, and vital signs today  Review of Systems   Constitutional: Positive for activity change  HENT: Negative  Eyes: Negative  Respiratory: Negative  Cardiovascular: Negative  Gastrointestinal: Negative  Endocrine: Negative  Genitourinary: Negative  Musculoskeletal: Positive for arthralgias, back pain, gait problem and myalgias  Skin: Negative  Allergic/Immunologic: Negative  Neurological: Positive for weakness and numbness  Hematological: Negative  Psychiatric/Behavioral: Negative  All other systems reviewed and are negative        Patient Active Problem List   Diagnosis    Essential hypertension    Cellulitis of left lower extremity    Fever in adult       Past Medical History:   Diagnosis Date    Hypertension     Migraines        Past Surgical History:   Procedure Laterality Date    FL GUIDED NEEDLE PLAC BX/ASP/INJ  3/1/2022    NERVE BLOCK Bilateral 2021    Procedure: BLOCK MEDIAL BRANCH Bilateral L3, L4, L5 #1;  Surgeon: Lon Pink MD;  Location: [facility];  Service: Pain Management     NERVE BLOCK Bilateral 2022    Procedure: BLOCK MEDIAL BRANCH Bilateral L3, L4, L5 #2;  Surgeon: Lon Pink MD;  Location: [facility];  Service: Pain Management     ID INJECTION,SACROILIAC JOINT Right 2021    Procedure: Interlaminar epidural steroid injection at L4-L5 ;  Surgeon: Lon Pink MD;  Location: [facility];  Service: Pain Management     RHIZOTOMY Bilateral 3/1/2022    Procedure: RHIZOTOMY LUMBAR L3, L4, L5;  Surgeon: Lon Pink MD;  Location: [facility];  Service: Pain Management     SHOULDER SURGERY Left        Family History   Problem Relation Age of Onset    Diabetes Mother     Migraines Mother     Hypertension Father        Social History     Occupational History    Not on file   Tobacco Use    Smoking status: Former Smoker     Packs/day: 1 00     Years: 10 00     Pack years: 10 00     Types: Cigarettes     Quit date: 2000     Years since quittin 8    Smokeless tobacco: Never Used   Vaping Use    Vaping Use: Never used   Substance and Sexual Activity    Alcohol use: Not Currently    Drug use: Yes     Types: Marijuana     Comment: Occasional marijuana    Sexual activity: Not on file       Current Outpatient Medications on File Prior to Visit   Medication Sig    cholecalciferol (VITAMIN D3) 400 units tablet Take 400 Units by mouth daily    fish oil-omega-3 fatty acids 1000 MG capsule Take 1 capsule by mouth    glucosamine-chondroitin 500-400 MG tablet Take 1 tablet by mouth    hydrochlorothiazide (HYDRODIURIL) 50 mg tablet Take 50 mg by mouth daily    losartan (COZAAR) 50 mg tablet Take 50 mg by mouth daily    magnesium 30 MG tablet Take 200 mg by mouth 2 (two) times a day    SUMAtriptan (IMITREX) 25 mg tablet     [DISCONTINUED] Diclofenac Sodium (VOLTAREN) 1 % Apply to affected area twice a day as needed for pain    [DISCONTINUED] gabapentin (NEURONTIN) 300 mg capsule Take 1 capsule (300 mg total) by mouth 3 (three) times a day    [DISCONTINUED] meloxicam (MOBIC) 7 5 mg tablet TAKE 1 TABLET (7 5 MG TOTAL) BY MOUTH 2 (TWO) TIMES A DAY AS NEEDED FOR MODERATE PAIN    Nerve Stimulator (TENS Therapy Pain Relief) SASHA Use 1 application 2 (two) times a day as needed (moderate pain) (Patient not taking: Reported on 3/16/2022 )     No current facility-administered medications on file prior to visit  Allergies   Allergen Reactions    Penicillins Hives     Hives           Physical Exam    /64   Pulse 69   Temp 98 3 °F (36 8 °C)   Resp 20   Ht 5' 11" (1 803 m)   Wt 94 3 kg (207 lb 12 8 oz)   BMI 28 98 kg/m²     Constitutional: normal, well developed, well nourished, alert, in no distress and non-toxic and no overt pain behavior  and overweight  Eyes: anicteric  HEENT: grossly intact  Neck: supple, symmetric, trachea midline and no masses   Pulmonary:even and unlabored  Cardiovascular:No edema or pitting edema present  Skin:Normal without rashes or lesions and well hydrated  Psychiatric:Mood and affect appropriate  Neurologic:Cranial Nerves II-XII grossly intact Sensation grossly intact; no clonus negative rodas's  Reflexes 2+ and brisk  SLR negative bilaterally  Musculoskeletal:normal gait  5/5 strength in all extremities with active range of motion movements bilaterally  Normal heel toe and tip toe walking    pain with positive facet loading maneuvers bilaterally and with lateral spine rotation  No ttp over lumbar paraspinal muscles  Positive right-sided sacroiliac joint loading, positive Rony fingers test, positive Gaenslen's test       Imaging    MRI LUMBAR SPINE WITHOUT CONTRAST     INDICATION: [id number]: Radiculopathy, lumbar region  Low back pain with numbness into the left leg      COMPARISON:  None      TECHNIQUE:  Sagittal T1, sagittal T2, sagittal inversion recovery, axial T1 and axial T2, coronal T2     IMAGE QUALITY:  Diagnostic     FINDINGS:     VERTEBRAL BODIES:  There are 5 lumbar type vertebral bodies  Levoscoliosis apex L1-2  Slight retrolisthesis L1-2 also noted  Modic type I degenerative marrow signal L1-2 eccentric to the right      SACRUM:  Normal signal within the sacrum  No evidence of insufficiency or stress fracture      DISTAL CORD AND CONUS:  Normal size and signal within the distal cord and conus      PARASPINAL SOFT TISSUES:  Paraspinal soft tissues are unremarkable      LOWER THORACIC DISC SPACES:  Normal disc height and signal   No disc herniation, canal stenosis or foraminal narrowing      LUMBAR DISC SPACES:     L1-L2:  Degenerative disc osteophyte complex with marginal osteophytes noted with a superimposed right paramedian protrusion type disc herniation  There is moderate right lateral recess stenosis  Correlate for right L2 radiculopathy  Severe right and   mild left foraminal narrowing with mild central stenosis      L2-L3:  Degenerative disc osteophyte complex with marginal osteophytes results in mild bilateral foraminal narrowing and mild central stenosis  Mild/moderate left lateral recess stenosis      L3-L4:  Mild annular bulge small marginal osteophytes result in moderate right and mild to moderate left foraminal narrowing with mild central stenosis      L4-L5:  Moderate to severe left and mild right facet hypertrophy identified    There is a disc osteophyte complex with marginal osteophytes resulting in moderate to severe left foraminal narrowing  Correlate for left L4 radiculopathy  Mild central   stenosis and moderate left lateral recess stenosis noted      L5-S1:  Moderate facet hypertrophy identified  There is annular bulging and small marginal osteophytes resulting in mild right foraminal narrowing and minimal central stenosis      IMPRESSION:     Spondylotic degenerative changes are seen throughout the lumbar spine  There is right lateral recess stenosis at L1-2 secondary to right paramedian protrusion type disc herniation with marginal osteophyte contributing to severe right foraminal   narrowing  Choroid for right L1 or right 2 radiculopathy      Severe left foraminal narrowing noted at L4-5 secondary to spondylotic degenerative changes  Correlate for left L4 radiculopathy      Levoscoliosis noted with its apex at L1-2  Degenerative changes are noted at remaining lumbar levels as described

## 2022-03-16 NOTE — PROGRESS NOTES
Assessment  1  Lumbar radiculopathy  -     meloxicam (MOBIC) 7 5 mg tablet; Take 1 tablet (7 5 mg total) by mouth 2 (two) times a day as needed for moderate pain  -     gabapentin (NEURONTIN) 300 mg capsule; Take 1 capsule (300 mg total) by mouth 3 (three) times a day    Greater than 90% relief of pain with improved ability to participate with IADLs after bilateral L3, L4, L5 medial branch blocks #1 and #2 and subsequent radiofrequency ablation  Previously described axial low back pain with aching, nagging, indolent, stabbing,, and characteristics  Positive facet loading maneuvers concordant with prominent lumbar facet arthropathy seen throughout lumbar spine on MRI  Risks, benefits and alternatives to lumbar medial branch blocks and subsequent radiofrequency ablation of successful thoroughly discussed with patient  Handouts provided questions answered to patient's satisfaction  Previously reported the following symptomatology:     Right low axial back pain in the area of right sacroiliac joint  Positive right-sided sacroiliac joint loading, positive Rony fingers test, positive Gaenslen's test   Aching, nagging, indolent, stabbing, throbbing pain  Additionally describes symptomatology of leg weakness and numbness that has been progressive  The pain resembles neurogenic claudication  No pertinent imaging available to review; however, pain with positive facet loading maneuvers bilaterally  5/5 strength in all extremities with active range of motion movements, negative SLR bilaterally  He has failed more than 12 weeks of conservative therapy including physical and chiropractic therapy  Reasonable at this time to proceed with multimodal pain therapy plan while obtaining MRI lumbar spine noncontrast to guide interventional therapy  Plan  -f/u prn or in 3 months for med check  -gabapentin 300 mg t i d  previously ordered for patient; may taper   counseled regarding sedative effects of taking this medication and provided up titration calendar  Counseled not to take medication while driving or operating heavy machinery/using stairs  -DME TENS  -Meloxicam 7 5 mg b i d  prn pain previously prescribed  Patient takes infrequently, 1-2x a week; counseled to let us know if he takes it every day so gi ppx can be written  Patient educated regarding bleeding risk of taking this medication not taking any other nonsteroidal anti-inflammatory medications while taking this medication; counseled thoroughly regarding potential risk of Cardiovascular injury, Kidney injury, Gastrointestinal ulceration/bleeding  Patient voiced understanding  -physical therapy for right-sided lumbar radiculopathy, lumbar facet arthropathy; Core exercises additionally provided for physician directed home PT that the patient plans to participate with for 1 hour, twice a week for the next 6 weeks  There are risks associated with opioid medications, including dependence, addiction and tolerance  The patient understands and agrees to use these medications only as prescribed  Potential side effects of the medications include, but are not limited to, constipation, drowsiness, addiction, impaired judgment and risk of fatal overdose if not taken as prescribed  The patient was warned against driving while taking sedation medications  Sharing medications is a felony  At this point in time, the patient is showing no signs of addiction, abuse, diversion or suicidal ideation  [place] Prescription Drug Monitoring Program report was reviewed and was appropriate     Complete risks and benefits including bleeding, infection, tissue reaction, nerve injury and allergic reaction were discussed  The approach was demonstrated using models and literature was provided  Verbal and written consent was obtained  My impressions and treatment recommendations were discussed in detail with the patient who verbalized understanding and had no further questions  Discharge instructions were provided  I personally saw and examined the patient and I agree with the above discussed plan of care  New Medications Ordered This Visit   Medications    meloxicam (MOBIC) 7 5 mg tablet     Sig: Take 1 tablet (7 5 mg total) by mouth 2 (two) times a day as needed for moderate pain     Dispense:  180 tablet     Refill:  0    gabapentin (NEURONTIN) 300 mg capsule     Sig: Take 1 capsule (300 mg total) by mouth 3 (three) times a day     Dispense:  270 capsule     Refill:  0       History of Present Illness    Greater than 90% relief of pain with improved ability to participate with IADLs after bilateral L3, L4, L5 medial branch blocks #1 and #2 and subsequent radiofrequency ablation  Previously described axial low back pain with aching, nagging, indolent, stabbing,, and characteristics  Positive facet loading maneuvers concordant with prominent lumbar facet arthropathy seen throughout lumbar spine on MRI  Risks, benefits and alternatives to lumbar medial branch blocks and subsequent radiofrequency ablation of successful thoroughly discussed with patient  Handouts provided questions answered to patient's satisfaction  Previously reported the following symptomatology:     Serena Cat is a 54 y o  male with pmhx of migraines, HTN presenting with right low axial back pain described primarily arthritic features  He describes 8/10 right low back pain that is worse in the mornings and worse at the end of the day  The pain is characterized by achy, nagging, indolent, crampy, stabbing pain in his axial right low back in the region of the sacroiliac joint  The patient describes that the pain is worse with standing for long periods of time on hard surfaces as well as with walking  The patient is a very active individual and feels as though this pain compromises his  participation with independent activities of daily living   The pain can be debilitating at times and contribute to significant disability, compromising overall activity and independent activities of daily living  He has tried chiropractic and physical therapy with limited relief of symptoms  Medications the patient has tried in the past include ibuprofen with limited relief  He describes   radicular symptoms in the bilateral L4 and L5 dermatomes but otherwise has good strength  He endorses weakness numbness or paresthesias  The patient denies any bowel or bladder dysfunction as well  The patient works as a  and states that he has significant debilitation with independent activities of daily living and overall function  Additionally describes neck pain with arthritic features; pain with lateral rotation/extesion flexion  Chiropractic and PT did help with this pain in the past as have nsaids  I have personally reviewed and/or updated the patient's past medical history, past surgical history, family history, social history, current medications, allergies, and vital signs today  Review of Systems   Constitutional: Positive for activity change  HENT: Negative  Eyes: Negative  Respiratory: Negative  Cardiovascular: Negative  Gastrointestinal: Negative  Endocrine: Negative  Genitourinary: Negative  Musculoskeletal: Positive for arthralgias, back pain, gait problem and myalgias  Skin: Negative  Allergic/Immunologic: Negative  Neurological: Positive for weakness and numbness  Hematological: Negative  Psychiatric/Behavioral: Negative  All other systems reviewed and are negative        Patient Active Problem List   Diagnosis    Essential hypertension    Cellulitis of left lower extremity    Fever in adult       Past Medical History:   Diagnosis Date    Hypertension     Migraines        Past Surgical History:   Procedure Laterality Date    FL GUIDED NEEDLE PLAC BX/ASP/INJ  3/1/2022    NERVE BLOCK Bilateral 12/28/2021    Procedure: BLOCK MEDIAL BRANCH Bilateral L3, L4, L5 #1;  Surgeon: Paola Terrell MD;  Location: [facility];  Service: Pain Management     NERVE BLOCK Bilateral 2022    Procedure: BLOCK MEDIAL BRANCH Bilateral L3, L4, L5 #2;  Surgeon: Paola Terrell MD;  Location: [facility];  Service: Pain Management     CA INJECTION,SACROILIAC JOINT Right 2021    Procedure: Interlaminar epidural steroid injection at L4-L5 ;  Surgeon: Paola Terrell MD;  Location: [facility];  Service: Pain Management     RHIZOTOMY Bilateral 3/1/2022    Procedure: RHIZOTOMY LUMBAR L3, L4, L5;  Surgeon: Paola Terrell MD;  Location: [facility];  Service: Pain Management     SHOULDER SURGERY Left        Family History   Problem Relation Age of Onset    Diabetes Mother     Migraines Mother     Hypertension Father        Social History     Occupational History    Not on file   Tobacco Use    Smoking status: Former Smoker     Packs/day: 1      Years: 10 00     Pack years: 10 00     Types: Cigarettes     Quit date: 2000     Years since quittin 8    Smokeless tobacco: Never Used   Vaping Use    Vaping Use: Never used   Substance and Sexual Activity    Alcohol use: Not Currently    Drug use: Yes     Types: Marijuana     Comment: Occasional marijuana    Sexual activity: Not on file       Current Outpatient Medications on File Prior to Visit   Medication Sig    cholecalciferol (VITAMIN D3) 400 units tablet Take 400 Units by mouth daily    fish oil-omega-3 fatty acids 1000 MG capsule Take 1 capsule by mouth    glucosamine-chondroitin 500-400 MG tablet Take 1 tablet by mouth    hydrochlorothiazide (HYDRODIURIL) 50 mg tablet Take 50 mg by mouth daily    losartan (COZAAR) 50 mg tablet Take 50 mg by mouth daily    magnesium 30 MG tablet Take 200 mg by mouth 2 (two) times a day    SUMAtriptan (IMITREX) 25 mg tablet     [DISCONTINUED] Diclofenac Sodium (VOLTAREN) 1 % Apply to affected area twice a day as needed for pain    [DISCONTINUED] gabapentin (NEURONTIN) 300 mg capsule Take 1 capsule (300 mg total) by mouth 3 (three) times a day    [DISCONTINUED] meloxicam (MOBIC) 7 5 mg tablet TAKE 1 TABLET (7 5 MG TOTAL) BY MOUTH 2 (TWO) TIMES A DAY AS NEEDED FOR MODERATE PAIN    Nerve Stimulator (TENS Therapy Pain Relief) SASHA Use 1 application 2 (two) times a day as needed (moderate pain) (Patient not taking: Reported on 3/16/2022 )     No current facility-administered medications on file prior to visit  Allergies   Allergen Reactions    Penicillins Hives     Hives           Physical Exam    /64   Pulse 69   Temp 98 3 °F (36 8 °C)   Resp 20   Ht 5' 11" (1 803 m)   Wt 94 3 kg (207 lb 12 8 oz)   BMI 28 98 kg/m²     Constitutional: normal, well developed, well nourished, alert, in no distress and non-toxic and no overt pain behavior  and overweight  Eyes: anicteric  HEENT: grossly intact  Neck: supple, symmetric, trachea midline and no masses   Pulmonary:even and unlabored  Cardiovascular:No edema or pitting edema present  Skin:Normal without rashes or lesions and well hydrated  Psychiatric:Mood and affect appropriate  Neurologic:Cranial Nerves II-XII grossly intact Sensation grossly intact; no clonus negative rodas's  Reflexes 2+ and brisk  SLR negative bilaterally  Musculoskeletal:normal gait  5/5 strength in all extremities with active range of motion movements bilaterally  Normal heel toe and tip toe walking  pain with positive facet loading maneuvers bilaterally and with lateral spine rotation  No ttp over lumbar paraspinal muscles  Positive right-sided sacroiliac joint loading, positive Rony fingers test, positive Gaenslen's test       Imaging    MRI LUMBAR SPINE WITHOUT CONTRAST     INDICATION: M54 [id number]: Radiculopathy, lumbar region     Low back pain with numbness into the left leg      COMPARISON:  None      TECHNIQUE:  Sagittal T1, sagittal T2, sagittal inversion recovery, axial T1 and axial T2, coronal T2     IMAGE QUALITY: Diagnostic     FINDINGS:     VERTEBRAL BODIES:  There are 5 lumbar type vertebral bodies  Levoscoliosis apex L1-2  Slight retrolisthesis L1-2 also noted  Modic type I degenerative marrow signal L1-2 eccentric to the right      SACRUM:  Normal signal within the sacrum  No evidence of insufficiency or stress fracture      DISTAL CORD AND CONUS:  Normal size and signal within the distal cord and conus      PARASPINAL SOFT TISSUES:  Paraspinal soft tissues are unremarkable      LOWER THORACIC DISC SPACES:  Normal disc height and signal   No disc herniation, canal stenosis or foraminal narrowing      LUMBAR DISC SPACES:     L1-L2:  Degenerative disc osteophyte complex with marginal osteophytes noted with a superimposed right paramedian protrusion type disc herniation  There is moderate right lateral recess stenosis  Correlate for right L2 radiculopathy  Severe right and   mild left foraminal narrowing with mild central stenosis      L2-L3:  Degenerative disc osteophyte complex with marginal osteophytes results in mild bilateral foraminal narrowing and mild central stenosis  Mild/moderate left lateral recess stenosis      L3-L4:  Mild annular bulge small marginal osteophytes result in moderate right and mild to moderate left foraminal narrowing with mild central stenosis      L4-L5:  Moderate to severe left and mild right facet hypertrophy identified  There is a disc osteophyte complex with marginal osteophytes resulting in moderate to severe left foraminal narrowing  Correlate for left L4 radiculopathy  Mild central   stenosis and moderate left lateral recess stenosis noted      L5-S1:  Moderate facet hypertrophy identified  There is annular bulging and small marginal osteophytes resulting in mild right foraminal narrowing and minimal central stenosis      IMPRESSION:     Spondylotic degenerative changes are seen throughout the lumbar spine    There is right lateral recess stenosis at L1-2 secondary to right paramedian protrusion type disc herniation with marginal osteophyte contributing to severe right foraminal   narrowing  Choroid for right L1 or right 2 radiculopathy      Severe left foraminal narrowing noted at L4-5 secondary to spondylotic degenerative changes  Correlate for left L4 radiculopathy      Levoscoliosis noted with its apex at L1-2  Degenerative changes are noted at remaining lumbar levels as described

## 2022-03-16 NOTE — PATIENT INSTRUCTIONS
Core Strengthening Exercises   WHAT YOU NEED TO KNOW:   Your core includes the muscles of your lower back, hip, pelvis, and abdomen  Core strengthening exercises help heal and strengthen these muscles  This helps prevent another injury, and keeps your pelvis, spine, and hips in the correct position  DISCHARGE INSTRUCTIONS:   Contact your healthcare provider if:   · You have sharp or worsening pain during exercise or at rest     · You have questions or concerns about your shoulder exercises  Safety tips:  Talk to your healthcare provider before you start an exercise program  A physical therapist can teach you how to do core strengthening exercises safely  · Do the exercises on a mat or firm surface  A firm surface will support your spine and prevent low back pain  Do not do these exercises on a bed  · Move slowly and smoothly  Avoid fast or jerky motions  · Stop if you feel pain  Core exercises should not be painful  Stop if you feel pain  · Breathe normally during core exercises  Do not hold your breath  This may cause an increase in blood pressure and prevent muscle strengthening  Your healthcare provider will tell you when to inhale and exhale during the exercise  · Begin all of your exercises with abdominal bracing  Abdominal bracing helps warm up your core muscles  You can also practice abdominal bracing throughout the day  Lie on your back with your knees bent and feet flat on the floor  Place your arms in a relaxed position beside your body  Tighten your abdominal muscles  Pull your belly button in and up toward your spine  Hold for 5 seconds  Relax your muscles  Repeat 10 times  Core strengthening exercises: Your healthcare provider will tell you how often to do these exercises  The provider will also tell you how many repetitions of each exercise you should do  Hold each exercise for 5 seconds or as directed  As you get stronger, increase your hold to 10 to 15 seconds   You can do some of these exercises on a stability ball, or with a weight  Ask your healthcare provider how to use a stability ball or weight for these exercises:  · Bridging:  Lie on your back with your knees bent and feet flat on the floor  Rest your arms at your side  Tighten your buttocks, and then lift your hips 1 inch off the floor  Hold for 5 seconds  When you can do this exercise without pain for 10 seconds, increase the distance you lift your hips  A good goal is to be able to lift your hips so that your shoulders, hips, and knees are in a straight line  · Dead bug:  Lie on your back with your knees bent and feet flat on the floor  Place your arms in a relaxed position beside your body  Begin with abdominal bracing  Next, raise one leg, keeping your knee bent  Hold for 5 seconds  Repeat with the other leg  When you can do this exercise without pain for 10 to 15 seconds, you may raise one straight leg and hold  Repeat with the other leg  · Quadruped:  Place your hands and knees on the floor  Keep your wrists directly below your shoulders and your knees directly below your hips  Pull your belly button in toward your spine  Do not flatten or arch your back  Tighten your abdominal muscles below your belly button  Hold for 5 seconds  When you can do this exercise without pain for 10 to 15 seconds, you may extend one arm and hold  Repeat on the other side  · Side bridge exercises:      ? Standing side bridge:  Stand next to a wall and extend one arm toward the wall  Place your palm flat on the wall with your fingers pointing upward  Begin with abdominal bracing  Next, without moving your feet, slowly bend your arm to 90 degrees  Hold for 5 seconds  Repeat on the other side  When you can do this exercise without pain for 10 to 15 seconds, you may do the bent leg side bridge on the floor  ? Bent leg side bridge:  Lie on one side with your legs, hips, and shoulders in a straight line   Prop yourself up onto your forearm so your elbow is directly below your shoulder  Bend your knees back to 90 degrees  Begin with abdominal bracing  Next, lift your hips and balance yourself on your forearm and knees  Hold for 5 seconds  Repeat on the other side  When you can do this exercise without pain for 10 to 15 seconds, you may do the straight leg side bridge on the floor  ? Straight leg side bridge:  Lie on one side with your legs, hips, and shoulders in a straight line  Prop yourself up onto your forearm so your elbow is directly below your shoulder  Begin with abdominal bracing  Lift your hips off the floor and balance yourself on your forearm and the outside of your flexed foot  Do not let your ankle bend sideways  Hold for 5 seconds  Repeat on the other side  When you can do this exercise without pain for 10 to 15 seconds, ask your healthcare provider for more advanced exercises  · Superman:  Lie on your stomach  Extend your arms forward on the floor  Tighten your abdominal muscles and lift your right hand and left leg off the floor  Hold this position  Slowly return to the starting position  Tighten your abdominal muscles and lift your left hand and right leg off the floor  Hold this position  Slowly return to the starting position  · Clam:  Lie on your side with your knees bent  Put your bottom arm under your head to keep your neck in line  Put your top hand on your hip to keep your pelvis from moving  Put your heels together, and keep them together during this exercise  Slowly raise your top knee toward the ceiling  Then lower your leg so your knees are together  Repeat this exercise 10 times  Then switch sides and do the exercise 10 times with the other leg  · Curl up:  Lie on your back with your knees bent and feet flat on the floor  Place your hands, palms down, underneath your lower back   Next, with your elbows on the floor, lift your shoulders and chest 2 to 3 inches off the floor  Keep your head in line with your shoulders  Hold this position  Slowly return to the starting position  · Straight leg raises:  Lie on your back with one leg straight  Bend the other knee and place your foot flat on the floor  Tighten your abdominal muscles  Keep your leg straight and slowly lift it straight up 6 to 12 inches off the floor  Hold this position  Lower your leg slowly  Do as many repetitions as directed on this side  Repeat with the other leg  · Plank:  Lie on your stomach  Bend your elbows and place your forearms flat on the floor  Lift your chest, stomach, and knees off the floor  Make sure your elbows are below your shoulders  Your body should be in a straight line  Do not let your hips or lower back sink to the ground  Squeeze your abdominal muscles together and hold for 15 seconds  To make this exercise harder, hold for 30 seconds or lift 1 leg at a time  · Bicycles:  Lie on your back  Bend both knees and bring them toward your chest  Your calves should be parallel to the floor  Place the palms of your hands on the back of your head  Straighten your right leg and keep it lifted 2 inches off the floor  Raise your head and shoulders off the floor and twist towards your left  Keep your head and shoulders lifted  Bend your right knee while you straighten your left leg  Keep your left leg 2 inches off the floor  Twist your head and chest towards the left leg  Continue to straighten 1 leg at a time and twist        Follow up with your doctor as directed:  Write down your questions so you remember to ask them during your visits  © Copyright [employer] 2022 Information is for End User's use only and may not be sold, redistributed or otherwise used for commercial purposes  All illustrations and images included in CareNotes® are the copyrighted property of A D A M [employer] Inc  or [facility]   The above information is an  only   It is not intended as medical advice for individual conditions or treatments  Talk to your doctor, nurse or pharmacist before following any medical regimen to see if it is safe and effective for you

## 2022-06-18 DIAGNOSIS — M54.16 LUMBAR RADICULOPATHY: ICD-10-CM

## 2022-06-20 RX ORDER — MELOXICAM 7.5 MG/1
7.5 TABLET ORAL 2 TIMES DAILY PRN
Qty: 180 TABLET | Refills: 0 | Status: SHIPPED | OUTPATIENT
Start: 2022-06-20

## 2022-07-15 ENCOUNTER — HOSPITAL ENCOUNTER (EMERGENCY)
Facility: HOSPITAL | Age: 52
Discharge: HOME | End: 2022-07-15
Attending: EMERGENCY MEDICINE | Admitting: EMERGENCY MEDICINE
Payer: COMMERCIAL

## 2022-07-15 VITALS
OXYGEN SATURATION: 100 % | RESPIRATION RATE: 18 BRPM | HEART RATE: 80 BPM | DIASTOLIC BLOOD PRESSURE: 80 MMHG | TEMPERATURE: 97.2 F | SYSTOLIC BLOOD PRESSURE: 137 MMHG

## 2022-07-15 DIAGNOSIS — T14.8XXA SKIN AVULSION: Primary | ICD-10-CM

## 2022-07-15 PROCEDURE — 90471 IMMUNIZATION ADMIN: CPT

## 2022-07-15 PROCEDURE — 99283 EMERGENCY DEPT VISIT LOW MDM: CPT

## 2022-07-15 PROCEDURE — 99284 EMERGENCY DEPT VISIT MOD MDM: CPT | Performed by: EMERGENCY MEDICINE

## 2022-07-15 PROCEDURE — 90715 TDAP VACCINE 7 YRS/> IM: CPT | Performed by: EMERGENCY MEDICINE

## 2022-07-15 RX ORDER — DOXYCYCLINE HYCLATE 100 MG/1
100 CAPSULE ORAL 2 TIMES DAILY
Qty: 10 CAPSULE | Refills: 0 | Status: SHIPPED | OUTPATIENT
Start: 2022-07-15 | End: 2022-07-20

## 2022-07-15 RX ADMIN — TETANUS TOXOID, REDUCED DIPHTHERIA TOXOID AND ACELLULAR PERTUSSIS VACCINE, ADSORBED 0.5 ML: 5; 2.5; 8; 8; 2.5 SUSPENSION INTRAMUSCULAR at 11:59

## 2022-07-15 NOTE — ED NOTES
I called and spoke to Samantha's sister Fiona Manjarrez who verified patient's demographics and is listed as her emergency contact. I gave her the below message per Paige's instructions.   Pt in no acute distress  Ambulates with a steady gait   Verbalizes understanding of discharge instructions         Romeo Sarabia RN  07/15/22 [id number]

## 2022-07-15 NOTE — ED PROVIDER NOTES
History  Chief Complaint   Patient presents with    Laceration     Pt states he stepped on mae stake in garden today  States he needs a tetanus update  Bleeding controlled at this time     Patient stepped on a mae metal stake in the garden today  Cut his right great toe  Tetanus status unknown  No other complaints  History provided by:  Patient   used: No    Laceration  Location: Right great toe  Length:  2  Laceration depth: Superficial avulsion of top layer of skin  Time since incident: Just prior to arrival   Laceration mechanism:  Metal edge  Pain details:     Quality:  Aching    Severity:  Mild    Timing:  Constant    Progression:  Unchanged  Foreign body present:  No foreign bodies  Relieved by:  Nothing  Worsened by:  Nothing  Ineffective treatments:  None tried  Tetanus status:  Out of date  Associated symptoms: no fever, no numbness, no rash, no redness and no streaking        Prior to Admission Medications   Prescriptions Last Dose Informant Patient Reported? Taking?    Nerve Stimulator (TENS Therapy Pain Relief) SASHA   No No   Sig: Use 1 application 2 (two) times a day as needed (moderate pain)   Patient not taking: Reported on 3/16/2022    SUMAtriptan (IMITREX) 25 mg tablet   Yes No   cholecalciferol (VITAMIN D3) 400 units tablet  Self Yes No   Sig: Take 400 Units by mouth daily   fish oil-omega-3 fatty acids 1000 MG capsule   Yes No   Sig: Take 1 capsule by mouth   gabapentin (NEURONTIN) 300 mg capsule   No No   Sig: Take 1 capsule (300 mg total) by mouth 3 (three) times a day   glucosamine-chondroitin 500-400 MG tablet   Yes No   Sig: Take 1 tablet by mouth   hydrochlorothiazide (HYDRODIURIL) 50 mg tablet   Yes No   Sig: Take 50 mg by mouth daily   losartan (COZAAR) 50 mg tablet   Yes No   Sig: Take 50 mg by mouth daily   magnesium 30 MG tablet  Self Yes No   Sig: Take 200 mg by mouth 2 (two) times a day   meloxicam (MOBIC) 7 5 mg tablet   No No   Sig: TAKE 1 TABLET (7 5 MG TOTAL) BY MOUTH 2 (TWO) TIMES A DAY AS NEEDED FOR MODERATE PAIN      Facility-Administered Medications: None       Past Medical History:   Diagnosis Date    Arthritis     Hypertension     Migraines        Past Surgical History:   Procedure Laterality Date    FL GUIDED NEEDLE PLAC BX/ASP/INJ  3/1/2022    NERVE BLOCK Bilateral 2021    Procedure: BLOCK MEDIAL BRANCH Bilateral L3, L4, L5 #1;  Surgeon: Dominique Rajput MD;  Location: [facility];  Service: Pain Management     NERVE BLOCK Bilateral 2022    Procedure: BLOCK MEDIAL BRANCH Bilateral L3, L4, L5 #2;  Surgeon: Dominique Rajput MD;  Location: [facility];  Service: Pain Management     MD INJECTION,SACROILIAC JOINT Right 2021    Procedure: Interlaminar epidural steroid injection at L4-L5 ;  Surgeon: Dominique Rajput MD;  Location: [facility];  Service: Pain Management     RHIZOTOMY Bilateral 3/1/2022    Procedure: RHIZOTOMY LUMBAR L3, L4, L5;  Surgeon: Dominique Rajput MD;  Location: [facility];  Service: Pain Management     SHOULDER SURGERY Left        Family History   Problem Relation Age of Onset    Diabetes Mother     Migraines Mother     Hypertension Father      I have reviewed and agree with the history as documented  E-Cigarette/Vaping    E-Cigarette Use Never User      E-Cigarette/Vaping Substances     Social History     Tobacco Use    Smoking status: Former Smoker     Packs/day: 1 00     Years: 10 00     Pack years: 10 00     Types: Cigarettes     Quit date: 2000     Years since quittin 1    Smokeless tobacco: Never Used   Vaping Use    Vaping Use: Never used   Substance Use Topics    Alcohol use: Not Currently    Drug use: Yes     Types: Marijuana     Comment: Occasional marijuana       Review of Systems   Constitutional: Negative for chills and fever  HENT: Negative for ear pain, hearing loss, sore throat, trouble swallowing and voice change  Eyes: Negative for pain and discharge     Respiratory: Negative for cough, shortness of breath and wheezing  Cardiovascular: Negative for chest pain and palpitations  Gastrointestinal: Negative for abdominal pain, blood in stool, constipation, diarrhea, nausea and vomiting  Genitourinary: Negative for dysuria, flank pain, frequency and hematuria  Musculoskeletal: Negative for joint swelling, neck pain and neck stiffness  Skin: Negative for rash and wound  Neurological: Negative for dizziness, seizures, syncope, facial asymmetry and headaches  Psychiatric/Behavioral: Negative for hallucinations, self-injury and suicidal ideas  All other systems reviewed and are negative  Physical Exam  Physical Exam  Constitutional:       General: He is not in acute distress  Appearance: Normal appearance  He is not ill-appearing  HENT:      Head: Normocephalic and atraumatic  Right Ear: External ear normal       Left Ear: External ear normal       Nose: Nose normal       Mouth/Throat:      Mouth: Mucous membranes are moist    Eyes:      Extraocular Movements: Extraocular movements intact  Pupils: Pupils are equal, round, and reactive to light  Cardiovascular:      Rate and Rhythm: Normal rate and regular rhythm  Pulmonary:      Effort: Pulmonary effort is normal  No respiratory distress  Breath sounds: Normal breath sounds  Abdominal:      General: Abdomen is flat  Bowel sounds are normal  There is no distension  Palpations: Abdomen is soft  Tenderness: There is no abdominal tenderness  Musculoskeletal:         General: No swelling or tenderness  Cervical back: Normal range of motion and neck supple  Comments: Superficial skin avulsion to the pad of the right great toe  No bleeding noted   Skin:     General: Skin is warm and dry  Capillary Refill: Capillary refill takes less than 2 seconds  Neurological:      General: No focal deficit present        Mental Status: He is alert and oriented to person, place, and time    Psychiatric:         Mood and Affect: Mood normal          Behavior: Behavior normal          Vital Signs  ED Triage Vitals [07/15/22 1141]   Temperature Pulse Respirations Blood Pressure SpO2   (!) 97 2 °F (36 2 °C) 80 18 137/80 100 %      Temp Source Heart Rate Source Patient Position - Orthostatic VS BP Location FiO2 (%)   Temporal -- -- -- --      Pain Score       2           Vitals:    07/15/22 1141   BP: 137/80   Pulse: 80         Visual Acuity      ED Medications  Medications - No data to display    Diagnostic Studies  Results Reviewed     None                 No orders to display              Procedures  Procedures         ED Course                               SBIRT 22yo+    Flowsheet Row Most Recent Value   SBIRT (25 yo +)    In order to provide better care to our patients, we are screening all of our patients for alcohol and drug use  Would it be okay to ask you these screening questions? No Filed at: 07/15/2022 1147                    MDM      Disposition  Final diagnoses:   Skin avulsion     Time reflects when diagnosis was documented in both MDM as applicable and the Disposition within this note     Time User Action Codes Description Comment    7/15/2022 11:51 AM Maria C Hale Add [T14  8XXA] Skin avulsion       ED Disposition     ED Disposition   Discharge    Condition   Stable    Date/Time   Fri Jul 15, 2022 11:51 AM    Comment   Sugarvíctor Arguello discharge to home/self care                 Follow-up Information     Follow up With Specialties Details Why [address] Liudmila Ware, DO  Call in 2 days  [address] [phone]            Patient's Medications   Discharge Prescriptions    DOXYCYCLINE HYCLATE (VIBRAMYCIN) 100 MG CAPSULE    Take 1 capsule (100 mg total) by mouth 2 (two) times a day for 5 days       Start Date: 7/15/2022 End Date: 7/20/2022       Order Dose: 100 mg       Quantity: 10 capsule    Refills: 0       No discharge procedures on file     PDMP Review     None          ED Provider  Electronically Signed by           Kellie Hernández MD  07/15/22 7272

## 2022-09-25 DIAGNOSIS — M54.16 LUMBAR RADICULOPATHY: ICD-10-CM

## 2022-09-25 RX ORDER — MELOXICAM 7.5 MG/1
7.5 TABLET ORAL 2 TIMES DAILY PRN
Qty: 180 TABLET | Refills: 0 | Status: SHIPPED | OUTPATIENT
Start: 2022-09-25

## 2022-10-03 ENCOUNTER — TELEPHONE (OUTPATIENT)
Dept: PAIN MEDICINE | Facility: CLINIC | Age: 52
End: 2022-10-03

## 2022-10-19 ENCOUNTER — OFFICE VISIT (OUTPATIENT)
Dept: PAIN MEDICINE | Facility: CLINIC | Age: 52
End: 2022-10-19
Payer: COMMERCIAL

## 2022-10-19 VITALS
RESPIRATION RATE: 20 BRPM | BODY MASS INDEX: 27.77 KG/M2 | SYSTOLIC BLOOD PRESSURE: 112 MMHG | DIASTOLIC BLOOD PRESSURE: 86 MMHG | WEIGHT: 198.4 LBS | HEIGHT: 71 IN | HEART RATE: 70 BPM

## 2022-10-19 DIAGNOSIS — M47.816 LUMBAR SPONDYLOSIS: Primary | ICD-10-CM

## 2022-10-19 PROCEDURE — 99214 OFFICE O/P EST MOD 30 MIN: CPT | Performed by: ANESTHESIOLOGY

## 2022-10-19 RX ORDER — METHYLPREDNISOLONE ACETATE 80 MG/ML
80 INJECTION, SUSPENSION INTRA-ARTICULAR; INTRALESIONAL; INTRAMUSCULAR; PARENTERAL; SOFT TISSUE ONCE
OUTPATIENT
Start: 2022-10-19 | End: 2022-10-19

## 2022-10-19 RX ORDER — LIDOCAINE HYDROCHLORIDE 10 MG/ML
10 INJECTION, SOLUTION EPIDURAL; INFILTRATION; INTRACAUDAL; PERINEURAL ONCE
OUTPATIENT
Start: 2022-10-19 | End: 2022-10-19

## 2022-10-19 RX ORDER — BUPIVACAINE HCL/PF 2.5 MG/ML
5 VIAL (ML) INJECTION ONCE
OUTPATIENT
Start: 2022-10-19 | End: 2022-10-19

## 2022-10-19 NOTE — PATIENT INSTRUCTIONS
Lumbar Radiofrequency Ablation   WHAT YOU NEED TO KNOW:   What do I need to know about lumbar radiofrequency ablation? Lumbar radiofrequency ablation (RFA) is a procedure used to treat facet joint pain in your lower back  Facet joints are found at the back of each vertebra  A needle electrode is used to send electrical currents to the nerves in your facet joint  The electrical currents create heat that damages the nerve so it cannot send pain signals  How do I prepare for lumbar RFA? Your healthcare provider will talk to you about how to prepare for this procedure  He may tell you not to eat or drink anything after midnight on the day of your procedure  He will tell you what medicines to take or not take on the day of your procedure  What will happen during lumbar RFA? You will lie on your stomach  You will be given local anesthesia to numb the area of your back where the needle electrode will be inserted  You may be given a sedative to help keep you relaxed  You may still feel pressure or pushing during the procedure, but you should not feel any pain  Your healthcare provider will use fluoroscopy (a type of x-ray) to guide the needle electrode to the nerves near your facet joint  Your healthcare provider may touch the affected nerve to make sure the needle electrode is in the right place  You will feel tingling or pressure when he does this  He will then apply local anesthesia to the nerve to numb it  This will prevent you from feeling pain when he applies heat to the nerve  Your healthcare provider will then apply heat to the nerve using the needle electrode  He may need to apply heat to more than one nerve  He will remove the needle electrode and apply a bandage over the area  What are the risks of lumbar RFA? You may have pain, numbness, tingling, or burning in the area where the lumbar RFA was done  These normally go away within 6 weeks  The needle electrode may injure your spinal nerves   This may cause permanent leg weakness or nerve pain  CARE AGREEMENT:   You have the right to help plan your care  Learn about your health condition and how it may be treated  Discuss treatment options with your healthcare providers to decide what care you want to receive  You always have the right to refuse treatment  The above information is an  only  It is not intended as medical advice for individual conditions or treatments  Talk to your doctor, nurse or pharmacist before following any medical regimen to see if it is safe and effective for you  © Copyright [employer] 2022 Information is for End User's use only and may not be sold, redistributed or otherwise used for commercial purposes  All illustrations and images included in CareNotes® are the copyrighted property of [employer] D A M [employer] Inc  or [employer]   Core Strengthening Exercises   WHAT YOU NEED TO KNOW:   Your core includes the muscles of your lower back, hip, pelvis, and abdomen  Core strengthening exercises help heal and strengthen these muscles  This helps prevent another injury, and keeps your pelvis, spine, and hips in the correct position  DISCHARGE INSTRUCTIONS:   Contact your healthcare provider if:   You have sharp or worsening pain during exercise or at rest     You have questions or concerns about your shoulder exercises  Safety tips:  Talk to your healthcare provider before you start an exercise program  A physical therapist can teach you how to do core strengthening exercises safely  Do the exercises on a mat or firm surface  A firm surface will support your spine and prevent low back pain  Do not do these exercises on a bed  Move slowly and smoothly  Avoid fast or jerky motions  Stop if you feel pain  Core exercises should not be painful  Stop if you feel pain  Breathe normally during core exercises  Do not hold your breath  This may cause an increase in blood pressure and prevent muscle strengthening  Your healthcare provider will tell you when to inhale and exhale during the exercise  Begin all of your exercises with abdominal bracing  Abdominal bracing helps warm up your core muscles  You can also practice abdominal bracing throughout the day  Lie on your back with your knees bent and feet flat on the floor  Place your arms in a relaxed position beside your body  Tighten your abdominal muscles  Pull your belly button in and up toward your spine  Hold for 5 seconds  Relax your muscles  Repeat 10 times  Core strengthening exercises: Your healthcare provider will tell you how often to do these exercises  The provider will also tell you how many repetitions of each exercise you should do  Hold each exercise for 5 seconds or as directed  As you get stronger, increase your hold to 10 to 15 seconds  You can do some of these exercises on a stability ball, or with a weight  Ask your healthcare provider how to use a stability ball or weight for these exercises:  Bridging:  Lie on your back with your knees bent and feet flat on the floor  Rest your arms at your side  Tighten your buttocks, and then lift your hips 1 inch off the floor  Hold for 5 seconds  When you can do this exercise without pain for 10 seconds, increase the distance you lift your hips  A good goal is to be able to lift your hips so that your shoulders, hips, and knees are in a straight line  Dead bug:  Lie on your back with your knees bent and feet flat on the floor  Place your arms in a relaxed position beside your body  Begin with abdominal bracing  Next, raise one leg, keeping your knee bent  Hold for 5 seconds  Repeat with the other leg  When you can do this exercise without pain for 10 to 15 seconds, you may raise one straight leg and hold  Repeat with the other leg  Quadruped:  Place your hands and knees on the floor  Keep your wrists directly below your shoulders and your knees directly below your hips   Pull your belly button in toward your spine  Do not flatten or arch your back  Tighten your abdominal muscles below your belly button  Hold for 5 seconds  When you can do this exercise without pain for 10 to 15 seconds, you may extend one arm and hold  Repeat on the other side  Side bridge exercises:      Standing side bridge:  Stand next to a wall and extend one arm toward the wall  Place your palm flat on the wall with your fingers pointing upward  Begin with abdominal bracing  Next, without moving your feet, slowly bend your arm to 90 degrees  Hold for 5 seconds  Repeat on the other side  When you can do this exercise without pain for 10 to 15 seconds, you may do the bent leg side bridge on the floor  Bent leg side bridge:  Lie on one side with your legs, hips, and shoulders in a straight line  Prop yourself up onto your forearm so your elbow is directly below your shoulder  Bend your knees back to 90 degrees  Begin with abdominal bracing  Next, lift your hips and balance yourself on your forearm and knees  Hold for 5 seconds  Repeat on the other side  When you can do this exercise without pain for 10 to 15 seconds, you may do the straight leg side bridge on the floor  Straight leg side bridge:  Lie on one side with your legs, hips, and shoulders in a straight line  Prop yourself up onto your forearm so your elbow is directly below your shoulder  Begin with abdominal bracing  Lift your hips off the floor and balance yourself on your forearm and the outside of your flexed foot  Do not let your ankle bend sideways  Hold for 5 seconds  Repeat on the other side  When you can do this exercise without pain for 10 to 15 seconds, ask your healthcare provider for more advanced exercises  Superman:  Lie on your stomach  Extend your arms forward on the floor  Tighten your abdominal muscles and lift your right hand and left leg off the floor  Hold this position  Slowly return to the starting position   Tighten your abdominal muscles and lift your left hand and right leg off the floor  Hold this position  Slowly return to the starting position  Clam:  Lie on your side with your knees bent  Put your bottom arm under your head to keep your neck in line  Put your top hand on your hip to keep your pelvis from moving  Put your heels together, and keep them together during this exercise  Slowly raise your top knee toward the ceiling  Then lower your leg so your knees are together  Repeat this exercise 10 times  Then switch sides and do the exercise 10 times with the other leg  Curl up:  Lie on your back with your knees bent and feet flat on the floor  Place your hands, palms down, underneath your lower back  Next, with your elbows on the floor, lift your shoulders and chest 2 to 3 inches off the floor  Keep your head in line with your shoulders  Hold this position  Slowly return to the starting position  Straight leg raises:  Lie on your back with one leg straight  Bend the other knee and place your foot flat on the floor  Tighten your abdominal muscles  Keep your leg straight and slowly lift it straight up 6 to 12 inches off the floor  Hold this position  Lower your leg slowly  Do as many repetitions as directed on this side  Repeat with the other leg  Plank:  Lie on your stomach  Bend your elbows and place your forearms flat on the floor  Lift your chest, stomach, and knees off the floor  Make sure your elbows are below your shoulders  Your body should be in a straight line  Do not let your hips or lower back sink to the ground  Squeeze your abdominal muscles together and hold for 15 seconds  To make this exercise harder, hold for 30 seconds or lift 1 leg at a time  Bicycles:  Lie on your back  Bend both knees and bring them toward your chest  Your calves should be parallel to the floor  Place the palms of your hands on the back of your head   Straighten your right leg and keep it lifted 2 inches off the floor  Raise your head and shoulders off the floor and twist towards your left  Keep your head and shoulders lifted  Bend your right knee while you straighten your left leg  Keep your left leg 2 inches off the floor  Twist your head and chest towards the left leg  Continue to straighten 1 leg at a time and twist        Follow up with your doctor as directed:  Write down your questions so you remember to ask them during your visits  © Copyright [employer] 2022 Information is for End User's use only and may not be sold, redistributed or otherwise used for commercial purposes  All illustrations and images included in CareNotes® are the copyrighted property of A D A M , Inc  or [facility] [employer]   The above information is an  only  It is not intended as medical advice for individual conditions or treatments  Talk to your doctor, nurse or pharmacist before following any medical regimen to see if it is safe and effective for you

## 2022-10-19 NOTE — PROGRESS NOTES
Assessment  1  Lumbar spondylosis - Bilateral  -     Case request operating room: RHIZOTOMY LUMBAR L3, L4, L5 medial branch nerves; Standing  -     Case request operating room: RHIZOTOMY LUMBAR L3, L4, L5 medial branch nerves    Greater than 90% relief of pain with improved ability to participate with IADLs after bilateral L3, L4, L5 medial branch blocks #1 and #2 and subsequent radiofrequency ablation performed 3/1/22; pain has returnd after more than 6 months of relief  Previously described axial low back pain with aching, nagging, indolent, stabbing,, and characteristics  Positive facet loading maneuvers concordant with prominent lumbar facet arthropathy seen throughout lumbar spine on MRI  Risks, benefits and alternatives to lumbar medial branch blocks and subsequent radiofrequency ablation of successful thoroughly discussed with patient  Handouts provided questions answered to patient's satisfaction  Previously reported the following symptomatology:     Right low axial back pain in the area of right sacroiliac joint  Positive right-sided sacroiliac joint loading, positive Rony fingers test, positive Gaenslen's test   Aching, nagging, indolent, stabbing, throbbing pain  Additionally describes symptomatology of leg weakness and numbness that has been progressive  The pain resembles neurogenic claudication  No pertinent imaging available to review; however, pain with positive facet loading maneuvers bilaterally  5/5 strength in all extremities with active range of motion movements, negative SLR bilaterally  He has failed more than 12 weeks of conservative therapy including physical and chiropractic therapy  Reasonable at this time to proceed with multimodal pain therapy plan while obtaining MRI lumbar spine noncontrast to guide interventional therapy      Plan  -repeat bilateral L3, L4, L5 medial branch nerve radiofrequency ablation; rtc 2 weeks post procedure  -gabapentin 300 mg t i d  previously ordered for patient; may taper  counseled regarding sedative effects of taking this medication and provided up titration calendar  Counseled not to take medication while driving or operating heavy machinery/using stairs  -DME TENS  -Meloxicam 7 5 mg b i d  prn pain previously prescribed  Patient takes infrequently, 1-2x a week; counseled to let us know if he takes it every day so gi ppx can be written  Patient educated regarding bleeding risk of taking this medication not taking any other nonsteroidal anti-inflammatory medications while taking this medication; counseled thoroughly regarding potential risk of Cardiovascular injury, Kidney injury, Gastrointestinal ulceration/bleeding  Patient voiced understanding  -physical therapy for right-sided lumbar radiculopathy, lumbar facet arthropathy; Core exercises additionally provided for physician directed home PT that the patient plans to participate with for 1 hour, twice a week for the next 6 weeks  There are risks associated with opioid medications, including dependence, addiction and tolerance  The patient understands and agrees to use these medications only as prescribed  Potential side effects of the medications include, but are not limited to, constipation, drowsiness, addiction, impaired judgment and risk of fatal overdose if not taken as prescribed  The patient was warned against driving while taking sedation medications  Sharing medications is a felony  At this point in time, the patient is showing no signs of addiction, abuse, diversion or suicidal ideation  [facility] Prescription Drug Monitoring Program report was reviewed and was appropriate     Complete risks and benefits including bleeding, infection, tissue reaction, nerve injury and allergic reaction were discussed  The approach was demonstrated using models and literature was provided  Verbal and written consent was obtained      My impressions and treatment recommendations were discussed in detail with the patient who verbalized understanding and had no further questions  Discharge instructions were provided  I personally saw and examined the patient and I agree with the above discussed plan of care  No orders of the defined types were placed in this encounter  History of Present Illness    Greater than 90% relief of pain with improved ability to participate with IADLs after bilateral L3, L4, L5 medial branch blocks #1 and #2 and subsequent radiofrequency ablation performed 3/1/22; pain has returnd after more than 6 months of relief  Previously described axial low back pain with aching, nagging, indolent, stabbing,, and characteristics  Positive facet loading maneuvers concordant with prominent lumbar facet arthropathy seen throughout lumbar spine on MRI  Risks, benefits and alternatives to lumbar medial branch blocks and subsequent radiofrequency ablation of successful thoroughly discussed with patient  Handouts provided questions answered to patient's satisfaction  Previously reported the following symptomatology:     Jeremie Taveras is a 54 y o  male with pmhx of migraines, HTN presenting with right low axial back pain described primarily arthritic features  He describes 8/10 right low back pain that is worse in the mornings and worse at the end of the day  The pain is characterized by achy, nagging, indolent, crampy, stabbing pain in his axial right low back in the region of the sacroiliac joint  The patient describes that the pain is worse with standing for long periods of time on hard surfaces as well as with walking  The patient is a very active individual and feels as though this pain compromises his  participation with independent activities of daily living  The pain can be debilitating at times and contribute to significant disability, compromising overall activity and independent activities of daily living   He has tried chiropractic and physical therapy with limited relief of symptoms  Medications the patient has tried in the past include ibuprofen with limited relief  He describes   radicular symptoms in the bilateral L4 and L5 dermatomes but otherwise has good strength  He endorses weakness numbness or paresthesias  The patient denies any bowel or bladder dysfunction as well  The patient works as a  and states that he has significant debilitation with independent activities of daily living and overall function  Additionally describes neck pain with arthritic features; pain with lateral rotation/extesion flexion  Chiropractic and PT did help with this pain in the past as have nsaids  I have personally reviewed and/or updated the patient's past medical history, past surgical history, family history, social history, current medications, allergies, and vital signs today  Review of Systems   Constitutional: Positive for activity change  HENT: Negative  Eyes: Negative  Respiratory: Negative  Cardiovascular: Negative  Gastrointestinal: Negative  Endocrine: Negative  Genitourinary: Negative  Musculoskeletal: Positive for arthralgias, back pain, gait problem and myalgias  Skin: Negative  Allergic/Immunologic: Negative  Neurological: Positive for weakness and numbness  Hematological: Negative  Psychiatric/Behavioral: Negative  All other systems reviewed and are negative        Patient Active Problem List   Diagnosis   • Essential hypertension   • Cellulitis of left lower extremity   • Fever in adult   • Lumbar spondylosis       Past Medical History:   Diagnosis Date   • Arthritis    • Hypertension    • Migraines        Past Surgical History:   Procedure Laterality Date   • FL GUIDED NEEDLE PLAC BX/ASP/INJ  3/1/2022   • NERVE BLOCK Bilateral 12/28/2021    Procedure: BLOCK MEDIAL BRANCH Bilateral L3, L4, L5 #1;  Surgeon: Humza Landin MD;  Location: [facility];  Service: Pain Management    • NERVE BLOCK Bilateral 1/20/2022 Procedure: BLOCK MEDIAL BRANCH Bilateral L3, L4, L5 #2;  Surgeon: Rajani Stone MD;  Location: [facility];  Service: Pain Management    • [place] INJECTION,SACROILIAC JOINT Right 2021    Procedure: Interlaminar epidural steroid injection at L4-L5 ;  Surgeon: Rajani Stone MD;  Location: [facility];  Service: Pain Management    • RHIZOTOMY Bilateral 3/1/2022    Procedure: RHIZOTOMY LUMBAR L3, L4, L5;  Surgeon: Rajani Stone MD;  Location: [facility];  Service: Pain Management    • SHOULDER SURGERY Left        Family History   Problem Relation Age of Onset   • Diabetes Mother    • Migraines Mother    • Hypertension Father        Social History     Occupational History   • Not on file   Tobacco Use   • Smoking status: Former Smoker     Packs/day:      Years: 10 00     Pack years: 10 00     Types: Cigarettes     Quit date: 2000     Years since quittin 4   • Smokeless tobacco: Never Used   Vaping Use   • Vaping Use: Never used   Substance and Sexual Activity   • Alcohol use: Not Currently   • Drug use: Yes     Types: Marijuana     Comment: Occasional marijuana   • Sexual activity: Not on file       Current Outpatient Medications on File Prior to Visit   Medication Sig   • cholecalciferol (VITAMIN D3) 400 units tablet Take 400 Units by mouth daily   • fish oil-omega-3 fatty acids 1000 MG capsule Take 1 capsule by mouth   • glucosamine-chondroitin 500-400 MG tablet Take 1 tablet by mouth   • hydrochlorothiazide (HYDRODIURIL) 50 mg tablet Take 50 mg by mouth daily   • losartan (COZAAR) 50 mg tablet Take 50 mg by mouth daily   • magnesium 30 MG tablet Take 200 mg by mouth 2 (two) times a day   • meloxicam (MOBIC) 7 5 mg tablet TAKE 1 TABLET (7 5 MG TOTAL) BY MOUTH 2 (TWO) TIMES A DAY AS NEEDED FOR MODERATE PAIN   • Nerve Stimulator (TENS Therapy Pain Relief) SASHA Use 1 application 2 (two) times a day as needed (moderate pain)   • SUMAtriptan (IMITREX) 25 mg tablet    • gabapentin (NEURONTIN) 300 mg capsule Take 1 capsule (300 mg total) by mouth 3 (three) times a day (Patient not taking: Reported on 10/19/2022)     No current facility-administered medications on file prior to visit  Allergies   Allergen Reactions   • Penicillins Hives     Hives           Physical Exam    /86   Pulse 70   Resp 20   Ht 5' 11" (1 803 m)   Wt 90 kg (198 lb 6 4 oz)   BMI 27 67 kg/m²     Constitutional: normal, well developed, well nourished, alert, in no distress and non-toxic and no overt pain behavior  and overweight  Eyes: anicteric  HEENT: grossly intact  Neck: supple, symmetric, trachea midline and no masses   Pulmonary:even and unlabored  Cardiovascular:No edema or pitting edema present  Skin:Normal without rashes or lesions and well hydrated  Psychiatric:Mood and affect appropriate  Neurologic:Cranial Nerves II-XII grossly intact Sensation grossly intact; no clonus negative rodas's  Reflexes 2+ and brisk  SLR negative bilaterally  Musculoskeletal:normal gait  5/5 strength in all extremities with active range of motion movements bilaterally  Normal heel toe and tip toe walking  pain with positive facet loading maneuvers bilaterally and with lateral spine rotation  No ttp over lumbar paraspinal muscles  Positive right-sided sacroiliac joint loading, positive Rony fingers test, positive Gaenslen's test       Imaging    MRI LUMBAR SPINE WITHOUT CONTRAST     INDICATION: [id number]: Radiculopathy, lumbar region  Low back pain with numbness into the left leg      COMPARISON:  None      TECHNIQUE:  Sagittal T1, sagittal T2, sagittal inversion recovery, axial T1 and axial T2, coronal T2     IMAGE QUALITY:  Diagnostic     FINDINGS:     VERTEBRAL BODIES:  There are 5 lumbar type vertebral bodies  Levoscoliosis apex L1-2  Slight retrolisthesis L1-2 also noted  Modic type I degenerative marrow signal L1-2 eccentric to the right      SACRUM:  Normal signal within the sacrum   No evidence of insufficiency or stress fracture      DISTAL CORD AND CONUS:  Normal size and signal within the distal cord and conus      PARASPINAL SOFT TISSUES:  Paraspinal soft tissues are unremarkable      LOWER THORACIC DISC SPACES:  Normal disc height and signal   No disc herniation, canal stenosis or foraminal narrowing      LUMBAR DISC SPACES:     L1-L2:  Degenerative disc osteophyte complex with marginal osteophytes noted with a superimposed right paramedian protrusion type disc herniation  There is moderate right lateral recess stenosis  Correlate for right L2 radiculopathy  Severe right and   mild left foraminal narrowing with mild central stenosis      L2-L3:  Degenerative disc osteophyte complex with marginal osteophytes results in mild bilateral foraminal narrowing and mild central stenosis  Mild/moderate left lateral recess stenosis      L3-L4:  Mild annular bulge small marginal osteophytes result in moderate right and mild to moderate left foraminal narrowing with mild central stenosis      L4-L5:  Moderate to severe left and mild right facet hypertrophy identified  There is a disc osteophyte complex with marginal osteophytes resulting in moderate to severe left foraminal narrowing  Correlate for left L4 radiculopathy  Mild central   stenosis and moderate left lateral recess stenosis noted      L5-S1:  Moderate facet hypertrophy identified  There is annular bulging and small marginal osteophytes resulting in mild right foraminal narrowing and minimal central stenosis      IMPRESSION:     Spondylotic degenerative changes are seen throughout the lumbar spine  There is right lateral recess stenosis at L1-2 secondary to right paramedian protrusion type disc herniation with marginal osteophyte contributing to severe right foraminal   narrowing  Choroid for right L1 or right 2 radiculopathy      Severe left foraminal narrowing noted at L4-5 secondary to spondylotic degenerative changes    Correlate for left L4 radiculopathy      Levoscoliosis noted with its apex at L1-2  Degenerative changes are noted at remaining lumbar levels as described

## 2022-10-19 NOTE — H&P (VIEW-ONLY)
Assessment  1  Lumbar spondylosis - Bilateral  -     Case request operating room: RHIZOTOMY LUMBAR L3, L4, L5 medial branch nerves; Standing  -     Case request operating room: RHIZOTOMY LUMBAR L3, L4, L5 medial branch nerves    Greater than 90% relief of pain with improved ability to participate with IADLs after bilateral L3, L4, L5 medial branch blocks #1 and #2 and subsequent radiofrequency ablation performed 3/1/22; pain has returnd after more than 6 months of relief  Previously described axial low back pain with aching, nagging, indolent, stabbing,, and characteristics  Positive facet loading maneuvers concordant with prominent lumbar facet arthropathy seen throughout lumbar spine on MRI  Risks, benefits and alternatives to lumbar medial branch blocks and subsequent radiofrequency ablation of successful thoroughly discussed with patient  Handouts provided questions answered to patient's satisfaction  Previously reported the following symptomatology:     Right low axial back pain in the area of right sacroiliac joint  Positive right-sided sacroiliac joint loading, positive Rony fingers test, positive Gaenslen's test   Aching, nagging, indolent, stabbing, throbbing pain  Additionally describes symptomatology of leg weakness and numbness that has been progressive  The pain resembles neurogenic claudication  No pertinent imaging available to review; however, pain with positive facet loading maneuvers bilaterally  5/5 strength in all extremities with active range of motion movements, negative SLR bilaterally  He has failed more than 12 weeks of conservative therapy including physical and chiropractic therapy  Reasonable at this time to proceed with multimodal pain therapy plan while obtaining MRI lumbar spine noncontrast to guide interventional therapy      Plan  -repeat bilateral L3, L4, L5 medial branch nerve radiofrequency ablation; rtc 2 weeks post procedure  -gabapentin 300 mg t i d  previously ordered for patient; may taper  counseled regarding sedative effects of taking this medication and provided up titration calendar  Counseled not to take medication while driving or operating heavy machinery/using stairs  -DME TENS  -Meloxicam 7 5 mg b i d  prn pain previously prescribed  Patient takes infrequently, 1-2x a week; counseled to let us know if he takes it every day so gi ppx can be written  Patient educated regarding bleeding risk of taking this medication not taking any other nonsteroidal anti-inflammatory medications while taking this medication; counseled thoroughly regarding potential risk of Cardiovascular injury, Kidney injury, Gastrointestinal ulceration/bleeding  Patient voiced understanding  -physical therapy for right-sided lumbar radiculopathy, lumbar facet arthropathy; Core exercises additionally provided for physician directed home PT that the patient plans to participate with for 1 hour, twice a week for the next 6 weeks  There are risks associated with opioid medications, including dependence, addiction and tolerance  The patient understands and agrees to use these medications only as prescribed  Potential side effects of the medications include, but are not limited to, constipation, drowsiness, addiction, impaired judgment and risk of fatal overdose if not taken as prescribed  The patient was warned against driving while taking sedation medications  Sharing medications is a felony  At this point in time, the patient is showing no signs of addiction, abuse, diversion or suicidal ideation  [place] Prescription Drug Monitoring Program report was reviewed and was appropriate     Complete risks and benefits including bleeding, infection, tissue reaction, nerve injury and allergic reaction were discussed  The approach was demonstrated using models and literature was provided  Verbal and written consent was obtained      My impressions and treatment recommendations were discussed in detail with the patient who verbalized understanding and had no further questions  Discharge instructions were provided  I personally saw and examined the patient and I agree with the above discussed plan of care  No orders of the defined types were placed in this encounter  History of Present Illness    Greater than 90% relief of pain with improved ability to participate with IADLs after bilateral L3, L4, L5 medial branch blocks #1 and #2 and subsequent radiofrequency ablation performed 3/1/22; pain has returnd after more than 6 months of relief  Previously described axial low back pain with aching, nagging, indolent, stabbing,, and characteristics  Positive facet loading maneuvers concordant with prominent lumbar facet arthropathy seen throughout lumbar spine on MRI  Risks, benefits and alternatives to lumbar medial branch blocks and subsequent radiofrequency ablation of successful thoroughly discussed with patient  Handouts provided questions answered to patient's satisfaction  Previously reported the following symptomatology:     Amee Steven is a 54 y o  male with pmhx of migraines, HTN presenting with right low axial back pain described primarily arthritic features  He describes 8/10 right low back pain that is worse in the mornings and worse at the end of the day  The pain is characterized by achy, nagging, indolent, crampy, stabbing pain in his axial right low back in the region of the sacroiliac joint  The patient describes that the pain is worse with standing for long periods of time on hard surfaces as well as with walking  The patient is a very active individual and feels as though this pain compromises his  participation with independent activities of daily living  The pain can be debilitating at times and contribute to significant disability, compromising overall activity and independent activities of daily living   He has tried chiropractic and physical therapy with limited relief of symptoms  Medications the patient has tried in the past include ibuprofen with limited relief  He describes   radicular symptoms in the bilateral L4 and L5 dermatomes but otherwise has good strength  He endorses weakness numbness or paresthesias  The patient denies any bowel or bladder dysfunction as well  The patient works as a  and states that he has significant debilitation with independent activities of daily living and overall function  Additionally describes neck pain with arthritic features; pain with lateral rotation/extesion flexion  Chiropractic and PT did help with this pain in the past as have nsaids  I have personally reviewed and/or updated the patient's past medical history, past surgical history, family history, social history, current medications, allergies, and vital signs today  Review of Systems   Constitutional: Positive for activity change  HENT: Negative  Eyes: Negative  Respiratory: Negative  Cardiovascular: Negative  Gastrointestinal: Negative  Endocrine: Negative  Genitourinary: Negative  Musculoskeletal: Positive for arthralgias, back pain, gait problem and myalgias  Skin: Negative  Allergic/Immunologic: Negative  Neurological: Positive for weakness and numbness  Hematological: Negative  Psychiatric/Behavioral: Negative  All other systems reviewed and are negative        Patient Active Problem List   Diagnosis   • Essential hypertension   • Cellulitis of left lower extremity   • Fever in adult   • Lumbar spondylosis       Past Medical History:   Diagnosis Date   • Arthritis    • Hypertension    • Migraines        Past Surgical History:   Procedure Laterality Date   • FL GUIDED NEEDLE PLAC BX/ASP/INJ  3/1/2022   • NERVE BLOCK Bilateral 12/28/2021    Procedure: BLOCK MEDIAL BRANCH Bilateral L3, L4, L5 #1;  Surgeon: Benny Hernandez MD;  Location: [facility];  Service: Pain Management    • NERVE BLOCK Bilateral 1/20/2022 Procedure: BLOCK MEDIAL BRANCH Bilateral L3, L4, L5 #2;  Surgeon: Theresa Muhammad MD;  Location: [facility];  Service: Pain Management    • RI INJECTION,SACROILIAC JOINT Right 2021    Procedure: Interlaminar epidural steroid injection at L4-L5 ;  Surgeon: Theresa Muhammad MD;  Location: [facility];  Service: Pain Management    • RHIZOTOMY Bilateral 3/1/2022    Procedure: RHIZOTOMY LUMBAR L3, L4, L5;  Surgeon: Theresa Muhammad MD;  Location: [facility];  Service: Pain Management    • SHOULDER SURGERY Left        Family History   Problem Relation Age of Onset   • Diabetes Mother    • Migraines Mother    • Hypertension Father        Social History     Occupational History   • Not on file   Tobacco Use   • Smoking status: Former Smoker     Packs/day:      Years: 10 00     Pack years: 10 00     Types: Cigarettes     Quit date: 2000     Years since quittin 4   • Smokeless tobacco: Never Used   Vaping Use   • Vaping Use: Never used   Substance and Sexual Activity   • Alcohol use: Not Currently   • Drug use: Yes     Types: Marijuana     Comment: Occasional marijuana   • Sexual activity: Not on file       Current Outpatient Medications on File Prior to Visit   Medication Sig   • cholecalciferol (VITAMIN D3) 400 units tablet Take 400 Units by mouth daily   • fish oil-omega-3 fatty acids 1000 MG capsule Take 1 capsule by mouth   • glucosamine-chondroitin 500-400 MG tablet Take 1 tablet by mouth   • hydrochlorothiazide (HYDRODIURIL) 50 mg tablet Take 50 mg by mouth daily   • losartan (COZAAR) 50 mg tablet Take 50 mg by mouth daily   • magnesium 30 MG tablet Take 200 mg by mouth 2 (two) times a day   • meloxicam (MOBIC) 7 5 mg tablet TAKE 1 TABLET (7 5 MG TOTAL) BY MOUTH 2 (TWO) TIMES A DAY AS NEEDED FOR MODERATE PAIN   • Nerve Stimulator (TENS Therapy Pain Relief) SASHA Use 1 application 2 (two) times a day as needed (moderate pain)   • SUMAtriptan (IMITREX) 25 mg tablet    • gabapentin (NEURONTIN) 300 mg capsule Take 1 capsule (300 mg total) by mouth 3 (three) times a day (Patient not taking: Reported on 10/19/2022)     No current facility-administered medications on file prior to visit  Allergies   Allergen Reactions   • Penicillins Hives     Hives           Physical Exam    /86   Pulse 70   Resp 20   Ht 5' 11" (1 803 m)   Wt 90 kg (198 lb 6 4 oz)   BMI 27 67 kg/m²     Constitutional: normal, well developed, well nourished, alert, in no distress and non-toxic and no overt pain behavior  and overweight  Eyes: anicteric  HEENT: grossly intact  Neck: supple, symmetric, trachea midline and no masses   Pulmonary:even and unlabored  Cardiovascular:No edema or pitting edema present  Skin:Normal without rashes or lesions and well hydrated  Psychiatric:Mood and affect appropriate  Neurologic:Cranial Nerves II-XII grossly intact Sensation grossly intact; no clonus negative rodas's  Reflexes 2+ and brisk  SLR negative bilaterally  Musculoskeletal:normal gait  5/5 strength in all extremities with active range of motion movements bilaterally  Normal heel toe and tip toe walking  pain with positive facet loading maneuvers bilaterally and with lateral spine rotation  No ttp over lumbar paraspinal muscles  Positive right-sided sacroiliac joint loading, positive Rony fingers test, positive Gaenslen's test       Imaging    MRI LUMBAR SPINE WITHOUT CONTRAST     INDICATION: M54 [id number]: Radiculopathy, lumbar region  Low back pain with numbness into the left leg      COMPARISON:  None      TECHNIQUE:  Sagittal T1, sagittal T2, sagittal inversion recovery, axial T1 and axial T2, coronal T2     IMAGE QUALITY:  Diagnostic     FINDINGS:     VERTEBRAL BODIES:  There are 5 lumbar type vertebral bodies  Levoscoliosis apex L1-2  Slight retrolisthesis L1-2 also noted  Modic type I degenerative marrow signal L1-2 eccentric to the right      SACRUM:  Normal signal within the sacrum   No evidence of insufficiency or stress fracture      DISTAL CORD AND CONUS:  Normal size and signal within the distal cord and conus      PARASPINAL SOFT TISSUES:  Paraspinal soft tissues are unremarkable      LOWER THORACIC DISC SPACES:  Normal disc height and signal   No disc herniation, canal stenosis or foraminal narrowing      LUMBAR DISC SPACES:     L1-L2:  Degenerative disc osteophyte complex with marginal osteophytes noted with a superimposed right paramedian protrusion type disc herniation  There is moderate right lateral recess stenosis  Correlate for right L2 radiculopathy  Severe right and   mild left foraminal narrowing with mild central stenosis      L2-L3:  Degenerative disc osteophyte complex with marginal osteophytes results in mild bilateral foraminal narrowing and mild central stenosis  Mild/moderate left lateral recess stenosis      L3-L4:  Mild annular bulge small marginal osteophytes result in moderate right and mild to moderate left foraminal narrowing with mild central stenosis      L4-L5:  Moderate to severe left and mild right facet hypertrophy identified  There is a disc osteophyte complex with marginal osteophytes resulting in moderate to severe left foraminal narrowing  Correlate for left L4 radiculopathy  Mild central   stenosis and moderate left lateral recess stenosis noted      L5-S1:  Moderate facet hypertrophy identified  There is annular bulging and small marginal osteophytes resulting in mild right foraminal narrowing and minimal central stenosis      IMPRESSION:     Spondylotic degenerative changes are seen throughout the lumbar spine  There is right lateral recess stenosis at L1-2 secondary to right paramedian protrusion type disc herniation with marginal osteophyte contributing to severe right foraminal   narrowing  Choroid for right L1 or right 2 radiculopathy      Severe left foraminal narrowing noted at L4-5 secondary to spondylotic degenerative changes    Correlate for left L4 radiculopathy      Levoscoliosis noted with its apex at L1-2  Degenerative changes are noted at remaining lumbar levels as described

## 2022-11-01 ENCOUNTER — HOSPITAL ENCOUNTER (OUTPATIENT)
Facility: HOSPITAL | Age: 52
Setting detail: OUTPATIENT SURGERY
Discharge: HOME | End: 2022-11-01
Attending: ANESTHESIOLOGY | Admitting: ANESTHESIOLOGY

## 2022-11-01 ENCOUNTER — APPOINTMENT (OUTPATIENT)
Dept: RADIOLOGY | Facility: HOSPITAL | Age: 52
End: 2022-11-01

## 2022-11-01 VITALS
HEART RATE: 68 BPM | WEIGHT: 198 LBS | RESPIRATION RATE: 18 BRPM | DIASTOLIC BLOOD PRESSURE: 87 MMHG | BODY MASS INDEX: 27.72 KG/M2 | TEMPERATURE: 98 F | HEIGHT: 71 IN | OXYGEN SATURATION: 97 % | SYSTOLIC BLOOD PRESSURE: 131 MMHG

## 2022-11-01 RX ORDER — LIDOCAINE HYDROCHLORIDE 10 MG/ML
10 INJECTION, SOLUTION EPIDURAL; INFILTRATION; INTRACAUDAL; PERINEURAL ONCE
Status: COMPLETED | OUTPATIENT
Start: 2022-11-01 | End: 2022-11-01

## 2022-11-01 RX ORDER — BUPIVACAINE HCL/PF 2.5 MG/ML
5 VIAL (ML) INJECTION ONCE
Status: DISCONTINUED | OUTPATIENT
Start: 2022-11-01 | End: 2022-11-01 | Stop reason: HOSPADM

## 2022-11-01 RX ORDER — ALPRAZOLAM 0.5 MG/1
0.5 TABLET ORAL ONCE
Status: COMPLETED | OUTPATIENT
Start: 2022-11-01 | End: 2022-11-01

## 2022-11-01 RX ORDER — BUPIVACAINE HYDROCHLORIDE 2.5 MG/ML
INJECTION, SOLUTION EPIDURAL; INFILTRATION; INTRACAUDAL AS NEEDED
Status: DISCONTINUED | OUTPATIENT
Start: 2022-11-01 | End: 2022-11-01 | Stop reason: HOSPADM

## 2022-11-01 RX ORDER — METHYLPREDNISOLONE ACETATE 80 MG/ML
80 INJECTION, SUSPENSION INTRA-ARTICULAR; INTRALESIONAL; INTRAMUSCULAR; PARENTERAL; SOFT TISSUE ONCE
Status: COMPLETED | OUTPATIENT
Start: 2022-11-01 | End: 2022-11-01

## 2022-11-01 RX ORDER — OXYCODONE HYDROCHLORIDE AND ACETAMINOPHEN 5; 325 MG/1; MG/1
1 TABLET ORAL ONCE
Status: COMPLETED | OUTPATIENT
Start: 2022-11-01 | End: 2022-11-01

## 2022-11-01 RX ADMIN — OXYCODONE HYDROCHLORIDE AND ACETAMINOPHEN 1 TABLET: 5; 325 TABLET ORAL at 09:42

## 2022-11-01 RX ADMIN — ALPRAZOLAM 0.5 MG: 0.5 TABLET ORAL at 09:42

## 2022-11-01 NOTE — DISCHARGE INSTRUCTIONS
YOUR 2 WEEK FOLLOW UP HAS BEEN SCHEDULED; IF YOU WISH TO CHANGE THE FOLLOW UP, PLEASE CALL THE SPINE AND PAIN CENTER AT [facility]: [phone]    Lumbar Radiofrequency Ablation   WHAT YOU NEED TO KNOW:   Lumbar radiofrequency ablation (RFA) is a procedure used to treat facet joint pain in your lower back  Facet joints are found at the back of each vertebra  A needle electrode is used to send electrical currents to the nerves in your facet joint  The electrical currents create heat that damages the nerve so it cannot send pain signals  DISCHARGE INSTRUCTIONS:   Seek care immediately if:   You cannot move your leg  You cannot control your urine or bowel movements  You have severe pain in your lower back  Contact your healthcare provider if:   You have leg weakness  You develop new symptoms  You have questions or concerns about your condition or care  Medicines:   Pain medicine  may be given  Ask how to take this medicine safely  Take your medicine as directed  Contact your healthcare provider if you think your medicine is not helping or if you have side effects  Tell him or her if you are allergic to any medicine  Keep a list of the medicines, vitamins, and herbs you take  Include the amounts, and when and why you take them  Bring the list or the pill bottles to follow-up visits  Carry your medicine list with you in case of an emergency  Follow up with your healthcare provider as directed:  Write down your questions so you remember to ask them during your visits  Activity:  Do not drive a car or operate machinery within 24 hours after your procedure  Ask your healthcare provider about any other activities you should avoid  © Copyright [employer] 2022 Information is for End User's use only and may not be sold, redistributed or otherwise used for commercial purposes   All illustrations and images included in CareNotes® are the copyrighted property of [employer]  or [address]  The above information is an  only  It is not intended as medical advice for individual conditions or treatments  Talk to your doctor, nurse or pharmacist before following any medical regimen to see if it is safe and effective for you

## 2022-11-01 NOTE — OP NOTE
OPERATIVE REPORT  PATIENT NAME: Cinthia Mcarthur    :  1970  MRN: [id number]  Pt Location:  GI ROOM 01    SURGERY DATE: 2022    Surgeon(s) and Role: Contreras Suárez MD - Primary    Preop Diagnosis:  Lumbar spondylosis [M47 816]    Post-Op Diagnosis Codes:     * Lumbar spondylosis [M47 816]    Procedure(s) (LRB):  RHIZOTOMY LUMBAR L3, L4, L5 medial branch nerves (Bilateral)    Specimen(s):  * No specimens in log *    Estimated Blood Loss:   Minimal    Drains:  * No LDAs found *    Anesthesia Type:   Local    Operative Indications:  Lumbar spondylosis [M47 816]    Operative Findings:  Bilateral L3, L4, L5 medial branch nerve regions identified under fluoroscopic guidance   Appropriate motor testing performed prior to radiofrequency lesioning of medial branch nerve regions    Complications:   None    Procedure and Technique:  Please see detailed procedure note    I was present for the entire procedure    Patient Disposition:  PACU     SIGNATURE: Kirk Gordon MD  DATE: 2022  TIME: 10:44 AM

## 2022-11-01 NOTE — PROCEDURES
Pre-procedure Diagnosis: Lumbar facet arthropathy  Post-procedure Diagnosis: Lumbar facet arthropathy  Operation Title(s):  1  Radiofrequency ablation of [BILATERAL] L3, L4, L5 medial branch nerves      2  Intraoperative fluoroscopy  Attending Surgeon:   Donna Murphy MD  Anesthesia:   Local    Indications: The patient is a 54y o  year-old male with a diagnosis of lumbar facet arthropathy  The patient's history and physical exam were reviewed  The patient has previously undergone diagnostic and confirmatory lumbar medial branch blocks  Fluoroscopy is being used for the precise placement of the needles at the lumbar medial branch nerves  The risks, benefits and alternatives to the procedure were discussed, and all questions were answered to the patient's satisfaction  The patient agreed to proceed, and written informed consent was obtained  Procedure in Detail: The patient was brought into the procedure room and placed in the prone position on the fluoroscopy table  The low back and upper buttock were prepped with chloraprep times two and draped in a sterile manner  AP fluoroscopy was used to identify the lumbar levels on the [LEFT] side  The fluoroscope beam was then obliqued to better visualize the junction of the superior articular process and transverse process on the [LEFT] side and then tilted caudally about 25 degrees  An 18-gauge, 100mm length, 10mm curved active tip radiofrequency probe was advanced toward the targeted points until bone was contacted  Multiple fluoroscopic views were made to ensure placement of the needle tip at the appropriate location of the medial branch nerve  Motor stimulation was performed at 2 Hz and 1 2 volts generating a twitch in the paraspinal muscles with no motor activity in the lower extremities  Next, AP fluoroscopy was used to identify the L5-S1 level  The fluoroscope been was tilted cephalad to visualize the sacral ala   The fluoroscope beam was then tilted about 45 degrees from that point and the skin and subcutaneous tissues in these identified areas were anesthetized with 1% lidocaine  An 18-gauge, 100mm length, 10mm curved active tip radiofrequency probe was advanced toward the targeted points until bone was contacted  Motor stimulation was performed at 2 Hz and 1 2 volts generating a twitch in the paraspinal muscles with no motor activity in the lower extremities  0 5ml of 2% lidocaine was injected prior to lesioning, which was performed for 90 seconds at 80 degrees centigrade  The lesion was repeated once more after slight repositioning of the needles on an oblique view  Once the lesions were complete, 1ml of a solution consisting of 5mL 0 25% bupivacaine and 1 mL Depo-medrol (80mg/mL) was injected through each needle and then removed with a 1% lidocaine flush  The patient's back was cleaned, and bandages were placed at the needle insertion site  The same procedure was repeated at the lumbar levels on the [RIGHT] side    Disposition: The patient tolerated the procedure well and there were no apparent complications  Vital signs remained stable throughout the procedure  The patient was taken to the recovery area where written discharge instructions for the procedure were given      Estimated Blood Loss: None  Specimens Obtained: N/A

## 2022-11-01 NOTE — INTERVAL H&P NOTE
H&P reviewed  After examining the patient I find no changes in the patients condition since the H&P had been written      Vitals:    11/01/22 0940   BP: 135/90   Pulse: 74   Resp: 18   Temp: 97 9 °F (36 6 °C)   SpO2: 99%

## 2023-07-24 ENCOUNTER — TELEPHONE (OUTPATIENT)
Dept: PAIN MEDICINE | Facility: CLINIC | Age: 53
End: 2023-07-24

## 2023-07-24 NOTE — TELEPHONE ENCOUNTER
Caller: nancy    Doctor: adeel    Reason for call: pt wants to know if he can schedule another procedure?     Call back#: [phone]

## 2023-08-21 NOTE — DISCHARGE INSTR - AVS FIRST PAGE
YOUR 2 WEEK FOLLOW UP HAS BEEN SCHEDULED; IF YOU WISH TO CHANGE THE FOLLOW UP, PLEASE CALL THE SPINE AND PAIN CENTER AT [facility]: [phone]    Lumbar Radiofrequency Ablation   WHAT YOU NEED TO KNOW:   Lumbar radiofrequency ablation (RFA) is a procedure used to treat facet joint pain in your lower back. Facet joints are found at the back of each vertebra. A needle electrode is used to send electrical currents to the nerves in your facet joint. The electrical currents create heat that damages the nerve so it cannot send pain signals. DISCHARGE INSTRUCTIONS:   Seek care immediately if:   You cannot move your leg. You cannot control your urine or bowel movements. You have severe pain in your lower back. Call your doctor if:   You have leg weakness. You develop new symptoms. You have questions or concerns about your condition or care. Medicines:   Pain medicine  may be given. Ask how to take this medicine safely. Take your medicine as directed. Contact your healthcare provider if you think your medicine is not helping or if you have side effects. Tell your provider if you are allergic to any medicine. Keep a list of the medicines, vitamins, and herbs you take. Include the amounts, and when and why you take them. Bring the list or the pill bottles to follow-up visits. Carry your medicine list with you in case of an emergency. Activity:  Do not drive a car or operate machinery within 24 hours after your procedure. Ask your healthcare provider about any other activities you should avoid. Follow up with your doctor as directed:  Write down your questions so you remember to ask them during your visits. © Copyright [employer] 2022 Information is for End User's use only and may not be sold, redistributed or otherwise used for commercial purposes. The above information is an  only. It is not intended as medical advice for individual conditions or treatments.  Talk to your doctor, nurse or pharmacist before following any medical regimen to see if it is safe and effective for you.

## 2023-08-22 ENCOUNTER — HOSPITAL ENCOUNTER (OUTPATIENT)
Facility: HOSPITAL | Age: 53
Setting detail: OUTPATIENT SURGERY
Discharge: HOME | End: 2023-08-22
Attending: ANESTHESIOLOGY | Admitting: ANESTHESIOLOGY
Payer: COMMERCIAL

## 2023-08-22 ENCOUNTER — TELEPHONE (OUTPATIENT)
Dept: RADIOLOGY | Facility: CLINIC | Age: 53
End: 2023-08-22

## 2023-08-22 ENCOUNTER — APPOINTMENT (OUTPATIENT)
Dept: RADIOLOGY | Facility: HOSPITAL | Age: 53
End: 2023-08-22
Payer: COMMERCIAL

## 2023-08-22 VITALS
BODY MASS INDEX: 27.72 KG/M2 | DIASTOLIC BLOOD PRESSURE: 71 MMHG | RESPIRATION RATE: 18 BRPM | WEIGHT: 198 LBS | HEART RATE: 55 BPM | HEIGHT: 71 IN | SYSTOLIC BLOOD PRESSURE: 124 MMHG | TEMPERATURE: 97.9 F | OXYGEN SATURATION: 98 %

## 2023-08-22 PROCEDURE — 64635 DESTROY LUMB/SAC FACET JNT: CPT | Performed by: ANESTHESIOLOGY

## 2023-08-22 PROCEDURE — 64636 DESTROY L/S FACET JNT ADDL: CPT | Performed by: ANESTHESIOLOGY

## 2023-08-22 RX ORDER — LIDOCAINE HYDROCHLORIDE 10 MG/ML
INJECTION, SOLUTION EPIDURAL; INFILTRATION; INTRACAUDAL; PERINEURAL AS NEEDED
Status: DISCONTINUED | OUTPATIENT
Start: 2023-08-22 | End: 2023-08-22 | Stop reason: HOSPADM

## 2023-08-22 RX ORDER — METHYLPREDNISOLONE ACETATE 80 MG/ML
INJECTION, SUSPENSION INTRA-ARTICULAR; INTRALESIONAL; INTRAMUSCULAR; SOFT TISSUE AS NEEDED
Status: DISCONTINUED | OUTPATIENT
Start: 2023-08-22 | End: 2023-08-22 | Stop reason: HOSPADM

## 2023-08-22 RX ORDER — BUPIVACAINE HYDROCHLORIDE 2.5 MG/ML
INJECTION, SOLUTION EPIDURAL; INFILTRATION; INTRACAUDAL AS NEEDED
Status: DISCONTINUED | OUTPATIENT
Start: 2023-08-22 | End: 2023-08-22 | Stop reason: HOSPADM

## 2023-08-22 RX ORDER — LIDOCAINE HYDROCHLORIDE 20 MG/ML
INJECTION, SOLUTION EPIDURAL; INFILTRATION; INTRACAUDAL; PERINEURAL AS NEEDED
Status: DISCONTINUED | OUTPATIENT
Start: 2023-08-22 | End: 2023-08-22 | Stop reason: HOSPADM

## 2023-08-22 NOTE — OP NOTE
OPERATIVE REPORT  PATIENT NAME: Paris Sams    :  1970  MRN: [id number]  Pt Location:  GI ROOM 01    SURGERY DATE: 2023    Surgeon(s) and Role: Breanne Trevizo MD - Primary    Preop Diagnosis:  Lumbar spondylosis [M47.816]    Post-Op Diagnosis Codes:     * Lumbar spondylosis [M47.816]    Procedure(s):  Bilateral - RHIZOTOMY LUMBAR L3. L4. L5 medial branch nerves    Specimen(s):  * No specimens in log *    Estimated Blood Loss:   Minimal    Drains:  * No LDAs found *    Anesthesia Type:   Local    Operative Indications:  Lumbar spondylosis [M47.816]    Operative Findings:  Bilateral L3, L4, L5 medial branch nerve regions identified under fluoroscopic guidance. Appropriate motor testing performed prior to radiofrequency lesioning of medial branch nerve regions    Complications:   None    Procedure and Technique:  Please see detailed procedure note    I was present for the entire procedure.     Patient Disposition:  PACU     SIGNATURE: Breanne Trevizo MD  DATE: 2023  TIME: 11:04 AM

## 2023-08-22 NOTE — H&P
Assessment  1. Lumbar spondylosis - Bilateral  -     Case request operating room: RHIZOTOMY LUMBAR L3, L4, L5 medial branch nerves; Standing  -     Case request operating room: RHIZOTOMY LUMBAR L3, L4, L5 medial branch nerves    Greater than 90% relief of pain with improved ability to participate with IADLs after bilateral L3, L4, L5 medial branch blocks #1 and #2 and subsequent radiofrequency ablation performed 11/1/22; pain has returnd after more than 7 months of relief. Previously described axial low back pain with aching, nagging, indolent, stabbing,, and characteristics. Positive facet loading maneuvers concordant with prominent lumbar facet arthropathy seen throughout lumbar spine on MRI. Risks, benefits and alternatives to repeat radiofrequency ablation of successful thoroughly discussed with patient. Handouts provided questions answered to patient's satisfaction. Previously reported the following symptomatology:     Right low axial back pain in the area of right sacroiliac joint. Positive right-sided sacroiliac joint loading, positive Rony fingers test, positive Gaenslen's test.  Aching, nagging, indolent, stabbing, throbbing pain. Additionally describes symptomatology of leg weakness and numbness that has been progressive. The pain resembles neurogenic claudication. No pertinent imaging available to review; however, pain with positive facet loading maneuvers bilaterally. 5/5 strength in all extremities with active range of motion movements, negative SLR bilaterally. He has failed more than 12 weeks of conservative therapy including physical and chiropractic therapy. Reasonable at this time to proceed with multimodal pain therapy plan while obtaining MRI lumbar spine noncontrast to guide interventional therapy. Plan  -repeat bilateral L3, L4, L5 medial branch nerve radiofrequency ablation; rtc 2 weeks post procedure  -gabapentin 300 mg t.i.d. previously ordered for patient; may taper. counseled regarding sedative effects of taking this medication and provided up titration calendar. Counseled not to take medication while driving or operating heavy machinery/using stairs  -DME TENS  -Meloxicam 7.5 mg b.i.d. prn pain previously prescribed. Patient takes infrequently, 1-2x a week; counseled to let us know if he takes it every day so gi ppx can be written. Patient educated regarding bleeding risk of taking this medication not taking any other nonsteroidal anti-inflammatory medications while taking this medication; counseled thoroughly regarding potential risk of Cardiovascular injury, Kidney injury, Gastrointestinal ulceration/bleeding. Patient voiced understanding  -physical therapy for right-sided lumbar radiculopathy, lumbar facet arthropathy; Core exercises additionally provided for physician directed home PT that the patient plans to participate with for 1 hour, twice a week for the next 6 weeks. There are risks associated with opioid medications, including dependence, addiction and tolerance. The patient understands and agrees to use these medications only as prescribed. Potential side effects of the medications include, but are not limited to, constipation, drowsiness, addiction, impaired judgment and risk of fatal overdose if not taken as prescribed. The patient was warned against driving while taking sedation medications. Sharing medications is a felony. At this point in time, the patient is showing no signs of addiction, abuse, diversion or suicidal ideation. [place] Prescription Drug Monitoring Program report was reviewed and was appropriate     Complete risks and benefits including bleeding, infection, tissue reaction, nerve injury and allergic reaction were discussed. The approach was demonstrated using models and literature was provided. Verbal and written consent was obtained.     My impressions and treatment recommendations were discussed in detail with the patient who verbalized understanding and had no further questions. Discharge instructions were provided. I personally saw and examined the patient and I agree with the above discussed plan of care. No orders of the defined types were placed in this encounter. History of Present Illness    Greater than 90% relief of pain with improved ability to participate with IADLs after bilateral L3, L4, L5 medial branch blocks #1 and #2 and subsequent radiofrequency ablation performed 3/1/22; pain has returnd after more than 6 months of relief. Previously described axial low back pain with aching, nagging, indolent, stabbing,, and characteristics. Previously reported the following symptomatology:     Scott Schreiber is a 50 y.o. male with pmhx of migraines, HTN presenting with right low axial back pain described primarily arthritic features. He describes 8/10 right low back pain that is worse in the mornings and worse at the end of the day. The pain is characterized by achy, nagging, indolent, crampy, stabbing pain in his axial right low back in the region of the sacroiliac joint. The patient describes that the pain is worse with standing for long periods of time on hard surfaces as well as with walking. The patient is a very active individual and feels as though this pain compromises his  participation with independent activities of daily living. The pain can be debilitating at times and contribute to significant disability, compromising overall activity and independent activities of daily living. He has tried chiropractic and physical therapy with limited relief of symptoms. Medications the patient has tried in the past include ibuprofen with limited relief. He describes   radicular symptoms in the bilateral L4 and L5 dermatomes but otherwise has good strength. He endorses weakness numbness or paresthesias. The patient denies any bowel or bladder dysfunction as well.  The patient works as a  and states that he has significant debilitation with independent activities of daily living and overall function. Additionally describes neck pain with arthritic features; pain with lateral rotation/extesion flexion. Chiropractic and PT did help with this pain in the past as have nsaids. I have personally reviewed and/or updated the patient's past medical history, past surgical history, family history, social history, current medications, allergies, and vital signs today. Review of Systems   Constitutional: Positive for activity change. HENT: Negative. Eyes: Negative. Respiratory: Negative. Cardiovascular: Negative. Gastrointestinal: Negative. Endocrine: Negative. Genitourinary: Negative. Musculoskeletal: Positive for arthralgias, back pain, gait problem and myalgias. Skin: Negative. Allergic/Immunologic: Negative. Neurological: Positive for weakness and numbness. Hematological: Negative. Psychiatric/Behavioral: Negative. All other systems reviewed and are negative.       Patient Active Problem List   Diagnosis   • Essential hypertension   • Cellulitis of left lower extremity   • Fever in adult   • Lumbar spondylosis       Past Medical History:   Diagnosis Date   • Arthritis    • Hypertension    • Migraines        Past Surgical History:   Procedure Laterality Date   • FL GUIDED NEEDLE PLAC BX/ASP/INJ  3/1/2022   • FL GUIDED NEEDLE PLAC BX/ASP/INJ  11/1/2022   • NERVE BLOCK Bilateral 12/28/2021    Procedure: BLOCK MEDIAL BRANCH Bilateral L3, L4, L5 #1;  Surgeon: Malcolm Hernández MD;  Location:  [facility];  Service: Pain Management    • NERVE BLOCK Bilateral 1/20/2022    Procedure: BLOCK MEDIAL BRANCH Bilateral L3, L4, L5 #2;  Surgeon: Malcolm Hernández MD;  Location:  [facility];  Service: Pain Management    • TN INJECT SI JOINT ARTHRGRPHY&/ANES/STEROID W/BETTYE Right 12/2/2021    Procedure: Interlaminar epidural steroid injection at L4-L5 ;  Surgeon: Malcolm Hernández MD;  Location: [facility];  Service: Pain Management    • RHIZOTOMY Bilateral 3/1/2022    Procedure: RHIZOTOMY LUMBAR L3, L4, L5;  Surgeon: Maria Luz Lora MD;  Location: [facility];  Service: Pain Management    • RHIZOTOMY Bilateral 2022    Procedure: RHIZOTOMY LUMBAR L3, L4, L5 medial branch nerves;  Surgeon: Maria Luz Lora MD;  Location: [facility];  Service: Pain Management    • SHOULDER SURGERY Left        Family History   Problem Relation Age of Onset   • Diabetes Mother    • Migraines Mother    • Hypertension Father        Social History     Occupational History   • Not on file   Tobacco Use   • Smoking status: Former     Packs/day: 1.00     Years: 10.00     Total pack years: 10.00     Types: Cigarettes     Quit date: 2000     Years since quittin.2   • Smokeless tobacco: Never   Vaping Use   • Vaping Use: Never used   Substance and Sexual Activity   • Alcohol use: Not Currently   • Drug use: Yes     Types: Marijuana     Comment: Occasional marijuana   • Sexual activity: Not on file       No current facility-administered medications on file prior to encounter.      Current Outpatient Medications on File Prior to Encounter   Medication Sig   • hydrochlorothiazide (HYDRODIURIL) 50 mg tablet Take 50 mg by mouth daily   • losartan (COZAAR) 50 mg tablet Take 50 mg by mouth daily   • meloxicam (MOBIC) 7.5 mg tablet TAKE 1 TABLET (7.5 MG TOTAL) BY MOUTH 2 (TWO) TIMES A DAY AS NEEDED FOR MODERATE PAIN   • SUMAtriptan (IMITREX) 25 mg tablet    • cholecalciferol (VITAMIN D3) 400 units tablet Take 400 Units by mouth daily   • fish oil-omega-3 fatty acids 1000 MG capsule Take 1 capsule by mouth   • gabapentin (NEURONTIN) 300 mg capsule Take 1 capsule (300 mg total) by mouth 3 (three) times a day (Patient not taking: Reported on 10/19/2022)   • glucosamine-chondroitin 500-400 MG tablet Take 1 tablet by mouth   • magnesium 30 MG tablet Take 200 mg by mouth 2 (two) times a day   • Nerve Stimulator (TENS Therapy Pain Relief) SASHA Use 1 application 2 (two) times a day as needed (moderate pain)       Allergies   Allergen Reactions   • Penicillins Hives     Hives           Physical Exam    /96   Pulse 63   Temp 97.9 °F (36.6 °C) (Temporal)   Resp 20   Ht 5' 11" (1.803 m)   Wt 89.8 kg (198 lb)   SpO2 99%   BMI 27.62 kg/m²     Constitutional: normal, well developed, well nourished, alert, in no distress and non-toxic and no overt pain behavior. and overweight  Eyes: anicteric  HEENT: grossly intact  Neck: supple, symmetric, trachea midline and no masses   Pulmonary:even and unlabored  Cardiovascular:No edema or pitting edema present  Skin:Normal without rashes or lesions and well hydrated  Psychiatric:Mood and affect appropriate  Neurologic:Cranial Nerves II-XII grossly intact Sensation grossly intact; no clonus negative rodas's. Reflexes 2+ and brisk. SLR negative bilaterally. Musculoskeletal:normal gait. 5/5 strength in all extremities with active range of motion movements bilaterally. Normal heel toe and tip toe walking. pain with positive facet loading maneuvers bilaterally and with lateral spine rotation. No ttp over lumbar paraspinal muscles. Positive right-sided sacroiliac joint loading, positive Rony fingers test, positive Gaenslen's test.      Imaging    MRI LUMBAR SPINE WITHOUT CONTRAST     INDICATION: M54.16: Radiculopathy, lumbar region. Low back pain with numbness into the left leg.     COMPARISON:  None.     TECHNIQUE:  Sagittal T1, sagittal T2, sagittal inversion recovery, axial T1 and axial T2, coronal T2.    IMAGE QUALITY:  Diagnostic     FINDINGS:     VERTEBRAL BODIES:  There are 5 lumbar type vertebral bodies. Levoscoliosis apex L1-2. Slight retrolisthesis L1-2 also noted. Modic type I degenerative marrow signal L1-2 eccentric to the right.     SACRUM:  Normal signal within the sacrum.  No evidence of insufficiency or stress fracture.     DISTAL CORD AND CONUS:  Normal size and signal within the distal cord and conus.     PARASPINAL SOFT TISSUES:  Paraspinal soft tissues are unremarkable.     LOWER THORACIC DISC SPACES:  Normal disc height and signal.  No disc herniation, canal stenosis or foraminal narrowing.     LUMBAR DISC SPACES:     L1-L2:  Degenerative disc osteophyte complex with marginal osteophytes noted with a superimposed right paramedian protrusion type disc herniation. There is moderate right lateral recess stenosis. Correlate for right L2 radiculopathy. Severe right and   mild left foraminal narrowing with mild central stenosis.     L2-L3:  Degenerative disc osteophyte complex with marginal osteophytes results in mild bilateral foraminal narrowing and mild central stenosis. Mild/moderate left lateral recess stenosis.     L3-L4:  Mild annular bulge small marginal osteophytes result in moderate right and mild to moderate left foraminal narrowing with mild central stenosis.     L4-L5:  Moderate to severe left and mild right facet hypertrophy identified. There is a disc osteophyte complex with marginal osteophytes resulting in moderate to severe left foraminal narrowing. Correlate for left L4 radiculopathy. Mild central   stenosis and moderate left lateral recess stenosis noted.     L5-S1:  Moderate facet hypertrophy identified. There is annular bulging and small marginal osteophytes resulting in mild right foraminal narrowing and minimal central stenosis.     IMPRESSION:     Spondylotic degenerative changes are seen throughout the lumbar spine. There is right lateral recess stenosis at L1-2 secondary to right paramedian protrusion type disc herniation with marginal osteophyte contributing to severe right foraminal   narrowing. Choroid for right L1 or right 2 radiculopathy.     Severe left foraminal narrowing noted at L4-5 secondary to spondylotic degenerative changes. Correlate for left L4 radiculopathy.     Levoscoliosis noted with its apex at L1-2.   Degenerative changes are noted at remaining lumbar levels as described.

## 2023-08-22 NOTE — PROCEDURES
Pre-procedure Diagnosis: Lumbar facet arthropathy  Post-procedure Diagnosis: Lumbar facet arthropathy  Operation Title(s):  1. Radiofrequency ablation of [BILATERAL] L3, L4, L5 medial branch nerves      2. Intraoperative fluoroscopy  Attending Surgeon:   Breanne Trevizo MD  Anesthesia:   Local    Indications: The patient is a 50y.o. year-old male with a diagnosis of lumbar facet arthropathy. The patient's history and physical exam were reviewed. The patient has previously undergone diagnostic and confirmatory lumbar medial branch blocks. Fluoroscopy is being used for the precise placement of the needles at the lumbar medial branch nerves. The risks, benefits and alternatives to the procedure were discussed, and all questions were answered to the patient's satisfaction. The patient agreed to proceed, and written informed consent was obtained. Procedure in Detail: The patient was brought into the procedure room and placed in the prone position on the fluoroscopy table. The low back and upper buttock were prepped with chloraprep times two and draped in a sterile manner. AP fluoroscopy was used to identify the lumbar levels on the [LEFT] side. The fluoroscope beam was then obliqued to better visualize the junction of the superior articular process and transverse process on the [LEFT] side and then tilted caudally about 25 degrees. An 18-gauge, 100mm length, 10mm curved active tip radiofrequency probe was advanced toward the targeted points until bone was contacted. Multiple fluoroscopic views were made to ensure placement of the needle tip at the appropriate location of the medial branch nerve. Motor stimulation was performed at 2 Hz and 1.2 volts generating a twitch in the paraspinal muscles with no motor activity in the lower extremities. Next, AP fluoroscopy was used to identify the L5-S1 level. The fluoroscope been was tilted cephalad to visualize the sacral ala.  The fluoroscope beam was then tilted about 45 degrees from that point and the skin and subcutaneous tissues in these identified areas were anesthetized with 1% lidocaine. An 18-gauge, 100mm length, 10mm curved active tip radiofrequency probe was advanced toward the targeted points until bone was contacted. Motor stimulation was performed at 2 Hz and 1.2 volts generating a twitch in the paraspinal muscles with no motor activity in the lower extremities. 0.5ml of 2% lidocaine was injected prior to lesioning, which was performed for 90 seconds at 80 degrees centigrade. The lesion was repeated once more after slight repositioning of the needles on an oblique view. Once the lesions were complete, 1ml of a solution consisting of 5mL 0.25% bupivacaine and 1 mL Depo-medrol (80mg/mL) was injected through each needle and then removed with a 1% lidocaine flush. The patient's back was cleaned, and bandages were placed at the needle insertion site. The same procedure was repeated at the lumbar levels on the [RIGHT] side    Disposition: The patient tolerated the procedure well and there were no apparent complications. Vital signs remained stable throughout the procedure. The patient was taken to the recovery area where written discharge instructions for the procedure were given.     Estimated Blood Loss: None  Specimens Obtained: N/A

## 2023-09-20 ENCOUNTER — OFFICE VISIT (OUTPATIENT)
Dept: PAIN MEDICINE | Facility: CLINIC | Age: 53
End: 2023-09-20
Payer: COMMERCIAL

## 2023-09-20 VITALS
SYSTOLIC BLOOD PRESSURE: 160 MMHG | HEIGHT: 71 IN | OXYGEN SATURATION: 98 % | TEMPERATURE: 98 F | HEART RATE: 56 BPM | DIASTOLIC BLOOD PRESSURE: 98 MMHG | WEIGHT: 196 LBS | BODY MASS INDEX: 27.44 KG/M2

## 2023-09-20 DIAGNOSIS — M47.816 LUMBAR SPONDYLOSIS: Primary | ICD-10-CM

## 2023-09-20 DIAGNOSIS — M54.16 LUMBAR RADICULOPATHY: ICD-10-CM

## 2023-09-20 PROCEDURE — 99213 OFFICE O/P EST LOW 20 MIN: CPT | Performed by: ANESTHESIOLOGY

## 2023-09-20 NOTE — PATIENT INSTRUCTIONS
Core Strengthening Exercises   WHAT YOU NEED TO KNOW:   Your core includes the muscles of your lower back, hip, pelvis, and abdomen. Core strengthening exercises help heal and strengthen these muscles. This helps prevent another injury, and keeps your pelvis, spine, and hips in the correct position. DISCHARGE INSTRUCTIONS:   Call your doctor or physical therapist if:   You have sharp or worsening pain during exercise or at rest.    You have questions or concerns about your shoulder exercises. Safety tips:  Talk to your healthcare provider before you start an exercise program. A physical therapist can teach you how to do core strengthening exercises safely. Do the exercises on a mat or firm surface. A firm surface will support your spine and prevent low back pain. Do not do these exercises on a bed. Move slowly and smoothly. Avoid fast or jerky motions. Stop if you feel pain. You may feel some discomfort at first, but you should not feel pain. Tell your provider or physical therapist if you have pain while you exercise. Regular exercise will help decrease your discomfort over time. Breathe normally during core exercises. Do not hold your breath. This may cause an increase in blood pressure and prevent muscle strengthening. Your healthcare provider will tell you when to inhale and exhale during the exercise. Begin all of your exercises with abdominal bracing. Abdominal bracing helps warm up your core muscles. You can also practice abdominal bracing throughout the day. Lie on your back with your knees bent and feet flat on the floor. Place your arms in a relaxed position beside your body. Tighten your abdominal muscles. Pull your belly button in and up toward your spine. Hold for 5 seconds. Relax your muscles. Repeat 10 times. Core strengthening exercises: Your healthcare provider will tell you how often to do these exercises.  The provider will also tell you how many repetitions of each exercise you should do. Hold each exercise for 5 seconds or as directed. As you get stronger, increase your hold to 10 to 15 seconds. You can do some of these exercises on a stability ball, or with a weight. Ask your healthcare provider how to use a stability ball or weight for these exercises:  Bridging:  Lie on your back with your knees bent and feet flat on the floor. Rest your arms at your side. Tighten your buttocks, and then lift your hips 1 inch off the floor. Hold for 5 seconds. When you can do this exercise without pain for 10 seconds, increase the distance you lift your hips. A good goal is to be able to lift your hips so that your shoulders, hips, and knees are in a straight line. Dead bug:  Lie on your back with your knees bent and feet flat on the floor. Place your arms in a relaxed position beside your body. Begin with abdominal bracing. Next, raise one leg, keeping your knee bent. Hold for 5 seconds. Repeat with the other leg. When you can do this exercise without pain for 10 to 15 seconds, you may raise one straight leg and hold. Repeat with the other leg. Quadruped:  Place your hands and knees on the floor. Keep your wrists directly below your shoulders and your knees directly below your hips. Pull your belly button in toward your spine. Do not flatten or arch your back. Tighten your abdominal muscles below your belly button. Hold for 5 seconds. When you can do this exercise without pain for 10 to 15 seconds, you may extend one arm and hold. Repeat on the other side. Side bridge exercises:      Standing side bridge:  Stand next to a wall and extend one arm toward the wall. Place your palm flat on the wall with your fingers pointing upward. Begin with abdominal bracing. Next, without moving your feet, slowly bend your arm to 90 degrees. Hold for 5 seconds. Repeat on the other side.  When you can do this exercise without pain for 10 to 15 seconds, you may do the bent leg side bridge on the floor. Bent leg side bridge:  Lie on one side with your legs, hips, and shoulders in a straight line. Prop yourself up onto your forearm so your elbow is directly below your shoulder. Bend your knees back to 90 degrees. Begin with abdominal bracing. Next, lift your hips and balance yourself on your forearm and knees. Hold for 5 seconds. Repeat on the other side. When you can do this exercise without pain for 10 to 15 seconds, you may do the straight leg side bridge on the floor. Straight leg side bridge:  Lie on one side with your legs, hips, and shoulders in a straight line. Prop yourself up onto your forearm so your elbow is directly below your shoulder. Begin with abdominal bracing. Lift your hips off the floor and balance yourself on your forearm and the outside of your flexed foot. Do not let your ankle bend sideways. Hold for 5 seconds. Repeat on the other side. When you can do this exercise without pain for 10 to 15 seconds, ask your healthcare provider for more advanced exercises. Superman:  Lie on your stomach. Extend your arms forward on the floor. Tighten your abdominal muscles and lift your right hand and left leg off the floor. Hold this position. Slowly return to the starting position. Tighten your abdominal muscles and lift your left hand and right leg off the floor. Hold this position. Slowly return to the starting position. Clam:  Lie on your side with your knees bent. Put your bottom arm under your head to keep your neck in line. Put your top hand on your hip to keep your pelvis from moving. Put your heels together, and keep them together during this exercise. Slowly raise your top knee toward the ceiling. Then lower your leg so your knees are together. Repeat this exercise 10 times. Then switch sides and do the exercise 10 times with the other leg. Curl up:  Lie on your back with your knees bent and feet flat on the floor.  Place your hands, palms down, underneath your lower back. Next, with your elbows on the floor, lift your shoulders and chest 2 to 3 inches off the floor. Keep your head in line with your shoulders. Hold this position. Slowly return to the starting position. Straight leg raises:  Lie on your back with one leg straight. Bend the other knee and place your foot flat on the floor. Tighten your abdominal muscles. Keep your leg straight and slowly lift it straight up 6 to 12 inches off the floor. Hold this position. Lower your leg slowly. Do as many repetitions as directed on this side. Repeat with the other leg. Plank:  Lie on your stomach. Bend your elbows and place your forearms flat on the floor. Lift your chest, stomach, and knees off the floor. Make sure your elbows are below your shoulders. Your body should be in a straight line. Do not let your hips or lower back sink to the ground. Squeeze your abdominal muscles together and hold for 15 seconds. To make this exercise harder, hold for 30 seconds or lift 1 leg at a time. Bicycles:  Lie on your back. Bend both knees and bring them toward your chest. Your calves should be parallel to the floor. Place the palms of your hands on the back of your head. Straighten your right leg and keep it lifted 2 inches off the floor. Raise your head and shoulders off the floor and twist towards your left. Keep your head and shoulders lifted. Bend your right knee while you straighten your left leg. Keep your left leg 2 inches off the floor. Twist your head and chest towards the left leg. Continue to straighten 1 leg at a time and twist.       Follow up with your doctor or physical therapist as directed:  Write down your questions so you remember to ask them during your visits. © Copyright [employer] 2023 Information is for End User's use only and may not be sold, redistributed or otherwise used for commercial purposes. The above information is an  only.  It is not intended as medical advice for individual conditions or treatments. Talk to your doctor, nurse or pharmacist before following any medical regimen to see if it is safe and effective for you.

## 2023-09-20 NOTE — PROGRESS NOTES
Assessment  1. Lumbar spondylosis    2. Lumbar radiculopathy    Greater than 90% relief of pain with improved ability to participate with IADLs after bilateral L3, L4, L5 medial branch blocks #1 and #2 and subsequent radiofrequency ablation performed 3/1/22; repeat performed 8/22/23 with equivocal relief. Previously described axial low back pain with aching, nagging, indolent, stabbing,, and characteristics. Positive facet loading maneuvers concordant with prominent lumbar facet arthropathy seen throughout lumbar spine on MRI. Additionally with left sided L4 radicular pain where there is moderate left sided transforaminal stenosis. Risks, benefits and alternatives to repeat BAMBI discussed with patient. Handouts provided questions answered to patient's satisfaction. Previously reported the following symptomatology:     Right low axial back pain in the area of right sacroiliac joint. Positive right-sided sacroiliac joint loading, positive Rony fingers test, positive Gaenslen's test.  Aching, nagging, indolent, stabbing, throbbing pain. Additionally describes symptomatology of leg weakness and numbness that has been progressive. The pain resembles neurogenic claudication. No pertinent imaging available to review; however, pain with positive facet loading maneuvers bilaterally. 5/5 strength in all extremities with active range of motion movements, negative SLR bilaterally. He has failed more than 12 weeks of conservative therapy including physical and chiropractic therapy. Reasonable at this time to proceed with multimodal pain therapy plan while obtaining MRI lumbar spine noncontrast to guide interventional therapy. Plan  -f/u prn to consider ILESI at L4-L5 or alternate level.  -gabapentin 300 mg t.i.d. previously ordered for patient; has since tapered. counseled regarding sedative effects of taking this medication and provided up titration calendar.   Counseled not to take medication while driving or operating heavy machinery/using stairs  -DME TENS  -Meloxicam 7.5 mg b.i.d. prn pain previously prescribed. Patient takes infrequently, 1-2x a week; counseled to let us know if he takes it every day so gi ppx can be written. Patient educated regarding bleeding risk of taking this medication not taking any other nonsteroidal anti-inflammatory medications while taking this medication; counseled thoroughly regarding potential risk of Cardiovascular injury, Kidney injury, Gastrointestinal ulceration/bleeding. Patient voiced understanding  -physical therapy for right-sided lumbar radiculopathy, lumbar facet arthropathy; Core exercises additionally provided for physician directed home PT that the patient plans to participate with for 1 hour, twice a week for the next 6 weeks. There are risks associated with opioid medications, including dependence, addiction and tolerance. The patient understands and agrees to use these medications only as prescribed. Potential side effects of the medications include, but are not limited to, constipation, drowsiness, addiction, impaired judgment and risk of fatal overdose if not taken as prescribed. The patient was warned against driving while taking sedation medications. Sharing medications is a felony. At this point in time, the patient is showing no signs of addiction, abuse, diversion or suicidal ideation. [place] Prescription Drug Monitoring Program report was reviewed and was appropriate     Complete risks and benefits including bleeding, infection, tissue reaction, nerve injury and allergic reaction were discussed. The approach was demonstrated using models and literature was provided. Verbal and written consent was obtained. My impressions and treatment recommendations were discussed in detail with the patient who verbalized understanding and had no further questions. Discharge instructions were provided.  I personally saw and examined the patient and I agree with the above discussed plan of care. No orders of the defined types were placed in this encounter. History of Present Illness    Greater than 90% relief of pain with improved ability to participate with IADLs after bilateral L3, L4, L5 medial branch blocks #1 and #2 and subsequent radiofrequency ablation performed 3/1/22; repeat performed 8/22/23 with equivocal relief. Previously described axial low back pain with aching, nagging, indolent, stabbing,, and characteristics. Positive facet loading maneuvers concordant with prominent lumbar facet arthropathy seen throughout lumbar spine on MRI. Additionally with left sided L4 radicular pain where there is moderate left sided transforaminal stenosis. Previously reported the following symptomatology:     Scott Schreiber is a 50 y.o. male with pmhx of migraines, HTN presenting with right low axial back pain described primarily arthritic features. He describes 8/10 right low back pain that is worse in the mornings and worse at the end of the day. The pain is characterized by achy, nagging, indolent, crampy, stabbing pain in his axial right low back in the region of the sacroiliac joint. The patient describes that the pain is worse with standing for long periods of time on hard surfaces as well as with walking. The patient is a very active individual and feels as though this pain compromises his  participation with independent activities of daily living. The pain can be debilitating at times and contribute to significant disability, compromising overall activity and independent activities of daily living. He has tried chiropractic and physical therapy with limited relief of symptoms. Medications the patient has tried in the past include ibuprofen with limited relief. He describes   radicular symptoms in the bilateral L4 and L5 dermatomes but otherwise has good strength. He endorses weakness numbness or paresthesias.  The patient denies any bowel or bladder dysfunction as well. The patient works as a  and states that he has significant debilitation with independent activities of daily living and overall function. Additionally describes neck pain with arthritic features; pain with lateral rotation/extesion flexion. Chiropractic and PT did help with this pain in the past as have nsaids. I have personally reviewed and/or updated the patient's past medical history, past surgical history, family history, social history, current medications, allergies, and vital signs today. Review of Systems   Constitutional: Positive for activity change. HENT: Negative. Eyes: Negative. Respiratory: Negative. Cardiovascular: Negative. Gastrointestinal: Negative. Endocrine: Negative. Genitourinary: Negative. Musculoskeletal: Positive for arthralgias, back pain, gait problem and myalgias. Skin: Negative. Allergic/Immunologic: Negative. Neurological: Positive for weakness and numbness. Hematological: Negative. Psychiatric/Behavioral: Negative. All other systems reviewed and are negative.       Patient Active Problem List   Diagnosis   • Essential hypertension   • Cellulitis of left lower extremity   • Fever in adult   • Lumbar spondylosis       Past Medical History:   Diagnosis Date   • Arthritis    • Hypertension    • Migraines        Past Surgical History:   Procedure Laterality Date   • FL GUIDED NEEDLE PLAC BX/ASP/INJ  3/1/2022   • FL GUIDED NEEDLE PLAC BX/ASP/INJ  11/1/2022   • NERVE BLOCK Bilateral 12/28/2021    Procedure: BLOCK MEDIAL BRANCH Bilateral L3, L4, L5 #1;  Surgeon: Charo Cleary MD;  Location: [facility];  Service: Pain Management    • NERVE BLOCK Bilateral 1/20/2022    Procedure: BLOCK MEDIAL BRANCH Bilateral L3, L4, L5 #2;  Surgeon: Charo Cleary MD;  Location: [facility];  Service: Pain Management    • DC INJECT SI JOINT ARTHRGRPHY&/ANES/STEROID W/BETTYE Right 12/2/2021    Procedure: Interlaminar epidural steroid injection at L4-L5 ;  Surgeon: Ruiz Campos MD;  Location: [facility];  Service: Pain Management    • RHIZOTOMY Bilateral 3/1/2022    Procedure: RHIZOTOMY LUMBAR L3, L4, L5;  Surgeon: Ruiz Campos MD;  Location: [facility];  Service: Pain Management    • RHIZOTOMY Bilateral 2022    Procedure: RHIZOTOMY LUMBAR L3, L4, L5 medial branch nerves;  Surgeon: Ruiz Campos MD;  Location: [facility];  Service: Pain Management    • RHIZOTOMY Bilateral 2023    Procedure: RHIZOTOMY LUMBAR L3, L4, L5 medial branch nerves;  Surgeon: Ruiz Campos MD;  Location: [facility];  Service: Pain Management    • SHOULDER SURGERY Left        Family History   Problem Relation Age of Onset   • Diabetes Mother    • Migraines Mother    • Hypertension Father        Social History     Occupational History   • Not on file   Tobacco Use   • Smoking status: Former     Packs/day: 1.00     Years: 10.00     Total pack years: 10.00     Types: Cigarettes     Quit date: 2000     Years since quittin.3   • Smokeless tobacco: Never   Vaping Use   • Vaping Use: Never used   Substance and Sexual Activity   • Alcohol use: Not Currently   • Drug use: Yes     Types: Marijuana     Comment: Occasional marijuana   • Sexual activity: Not on file       Current Outpatient Medications on File Prior to Visit   Medication Sig   • hydrochlorothiazide (HYDRODIURIL) 50 mg tablet Take 50 mg by mouth daily   • losartan (COZAAR) 50 mg tablet Take 50 mg by mouth daily   • meloxicam (MOBIC) 7.5 mg tablet TAKE 1 TABLET (7.5 MG TOTAL) BY MOUTH 2 (TWO) TIMES A DAY AS NEEDED FOR MODERATE PAIN   • SUMAtriptan (IMITREX) 25 mg tablet    • cholecalciferol (VITAMIN D3) 400 units tablet Take 400 Units by mouth daily (Patient not taking: Reported on 2023)   • fish oil-omega-3 fatty acids 1000 MG capsule Take 1 capsule by mouth (Patient not taking: Reported on 2023)   • gabapentin (NEURONTIN) 300 mg capsule Take 1 capsule (300 mg total) by mouth 3 (three) times a day (Patient not taking: Reported on 10/19/2022)   • glucosamine-chondroitin 500-400 MG tablet Take 1 tablet by mouth (Patient not taking: Reported on 9/20/2023)   • magnesium 30 MG tablet Take 200 mg by mouth 2 (two) times a day (Patient not taking: Reported on 9/20/2023)   • Nerve Stimulator (TENS Therapy Pain Relief) SASHA Use 1 application 2 (two) times a day as needed (moderate pain) (Patient not taking: Reported on 9/20/2023)     No current facility-administered medications on file prior to visit. Allergies   Allergen Reactions   • Penicillins Hives     Hives           Physical Exam    /98 (BP Location: Left arm, Patient Position: Sitting, Cuff Size: Adult)   Pulse 56   Temp 98 °F (36.7 °C)   Ht 5' 11" (1.803 m)   Wt 88.9 kg (196 lb)   SpO2 98%   BMI 27.34 kg/m²     Constitutional: normal, well developed, well nourished, alert, in no distress and non-toxic and no overt pain behavior. and overweight  Eyes: anicteric  HEENT: grossly intact  Neck: supple, symmetric, trachea midline and no masses   Pulmonary:even and unlabored  Cardiovascular:No edema or pitting edema present  Skin:Normal without rashes or lesions and well hydrated  Psychiatric:Mood and affect appropriate  Neurologic:Cranial Nerves II-XII grossly intact Sensation grossly intact; no clonus negative rodas's. Reflexes 2+ and brisk. SLR negative bilaterally. Musculoskeletal:normal gait. 5/5 strength in all extremities with active range of motion movements bilaterally. Normal heel toe and tip toe walking. pain with positive facet loading maneuvers bilaterally and with lateral spine rotation. No ttp over lumbar paraspinal muscles. Positive right-sided sacroiliac joint loading, positive Rony fingers test, positive Gaenslen's test.      Imaging    MRI LUMBAR SPINE WITHOUT CONTRAST     INDICATION: M54.16: Radiculopathy, lumbar region.    Low back pain with numbness into the left leg.     COMPARISON: None.     TECHNIQUE:  Sagittal T1, sagittal T2, sagittal inversion recovery, axial T1 and axial T2, coronal T2.    IMAGE QUALITY:  Diagnostic     FINDINGS:     VERTEBRAL BODIES:  There are 5 lumbar type vertebral bodies. Levoscoliosis apex L1-2. Slight retrolisthesis L1-2 also noted. Modic type I degenerative marrow signal L1-2 eccentric to the right.     SACRUM:  Normal signal within the sacrum. No evidence of insufficiency or stress fracture.     DISTAL CORD AND CONUS:  Normal size and signal within the distal cord and conus.     PARASPINAL SOFT TISSUES:  Paraspinal soft tissues are unremarkable.     LOWER THORACIC DISC SPACES:  Normal disc height and signal.  No disc herniation, canal stenosis or foraminal narrowing.     LUMBAR DISC SPACES:     L1-L2:  Degenerative disc osteophyte complex with marginal osteophytes noted with a superimposed right paramedian protrusion type disc herniation. There is moderate right lateral recess stenosis. Correlate for right L2 radiculopathy. Severe right and   mild left foraminal narrowing with mild central stenosis.     L2-L3:  Degenerative disc osteophyte complex with marginal osteophytes results in mild bilateral foraminal narrowing and mild central stenosis. Mild/moderate left lateral recess stenosis.     L3-L4:  Mild annular bulge small marginal osteophytes result in moderate right and mild to moderate left foraminal narrowing with mild central stenosis.     L4-L5:  Moderate to severe left and mild right facet hypertrophy identified. There is a disc osteophyte complex with marginal osteophytes resulting in moderate to severe left foraminal narrowing. Correlate for left L4 radiculopathy. Mild central   stenosis and moderate left lateral recess stenosis noted.     L5-S1:  Moderate facet hypertrophy identified.   There is annular bulging and small marginal osteophytes resulting in mild right foraminal narrowing and minimal central stenosis.     IMPRESSION:     Spondylotic degenerative changes are seen throughout the lumbar spine. There is right lateral recess stenosis at L1-2 secondary to right paramedian protrusion type disc herniation with marginal osteophyte contributing to severe right foraminal   narrowing. Choroid for right L1 or right 2 radiculopathy.     Severe left foraminal narrowing noted at L4-5 secondary to spondylotic degenerative changes. Correlate for left L4 radiculopathy.     Levoscoliosis noted with its apex at L1-2. Degenerative changes are noted at remaining lumbar levels as described.

## 2024-01-09 ENCOUNTER — TELEPHONE (OUTPATIENT)
Age: 54
End: 2024-01-09

## 2024-01-09 DIAGNOSIS — M54.16 LUMBAR RADICULOPATHY: ICD-10-CM

## 2024-01-09 NOTE — TELEPHONE ENCOUNTER
Caller: Felix    Doctor: Luda    Reason for call: Pt is calling because he needs a refill for   meloxicam (MOBIC) 7.5 mg tablet TAKE 1 TABLET (7.5 MG TOTAL) BY MOUTH 2 (TWO) TIMES A DAY AS NEEDED FOR MODERATE PAIN     Pt has no pills left    Call back#: [phone]

## 2024-01-10 RX ORDER — MELOXICAM 7.5 MG/1
7.5 TABLET ORAL 2 TIMES DAILY PRN
Qty: 180 TABLET | Refills: 3 | Status: SHIPPED | OUTPATIENT
Start: 2024-01-10

## 2024-01-12 ENCOUNTER — OFFICE VISIT (OUTPATIENT)
Dept: PAIN MEDICINE | Facility: CLINIC | Age: 54
End: 2024-01-12
Payer: COMMERCIAL

## 2024-01-12 VITALS
BODY MASS INDEX: 28.84 KG/M2 | HEIGHT: 71 IN | OXYGEN SATURATION: 100 % | HEART RATE: 51 BPM | TEMPERATURE: 97.5 F | SYSTOLIC BLOOD PRESSURE: 148 MMHG | DIASTOLIC BLOOD PRESSURE: 78 MMHG | WEIGHT: 206 LBS

## 2024-01-12 DIAGNOSIS — M54.16 LUMBAR RADICULOPATHY: Primary | ICD-10-CM

## 2024-01-12 DIAGNOSIS — M47.816 LUMBAR SPONDYLOSIS: ICD-10-CM

## 2024-01-12 PROCEDURE — 99214 OFFICE O/P EST MOD 30 MIN: CPT | Performed by: ANESTHESIOLOGY

## 2024-01-12 RX ORDER — 0.9 % SODIUM CHLORIDE 0.9 %
1 VIAL (ML) INJECTION ONCE
OUTPATIENT
Start: 2024-01-12 | End: 2024-01-12

## 2024-01-12 RX ORDER — LIDOCAINE HYDROCHLORIDE 10 MG/ML
10 INJECTION, SOLUTION EPIDURAL; INFILTRATION; INTRACAUDAL; PERINEURAL ONCE
OUTPATIENT
Start: 2024-01-12 | End: 2024-01-12

## 2024-01-12 NOTE — H&P (VIEW-ONLY)
Assessment  1. Lumbar radiculopathy  -     Case request operating room: BLOCK / INJECTION EPIDURAL STEROID LUMBAR     Left L5 and Right L2 Transforaminal epidural steroid injection; Standing  -     Case request operating room: BLOCK / INJECTION EPIDURAL STEROID LUMBAR     Left L5 and Right L2 Transforaminal epidural steroid injection    2. Lumbar spondylosis  -     Case request operating room: BLOCK / INJECTION EPIDURAL STEROID LUMBAR     Left L5 and Right L2 Transforaminal epidural steroid injection; Standing  -     Case request operating room: BLOCK / INJECTION EPIDURAL STEROID LUMBAR     Left L5 and Right L2 Transforaminal epidural steroid injection    Greater than 90% relief of pain with improved ability to participate with IADLs after bilateral L3, L4, L5 medial branch blocks #1 and #2 and subsequent radiofrequency ablation performed 3/1/22; repeat performed 8/22/23 with equivocal relief. Previously described axial low back pain with aching, nagging, indolent, stabbing,, and characteristics.  Positive facet loading maneuvers concordant with prominent lumbar facet arthropathy seen throughout lumbar spine on MRI. Additionally with left sided L5 and Right L2 radicular pain where there is moderate left sided transforaminal stenosis at respective transforaminal regions of spine . Risks, benefits and alternatives to repeat BAMBI discussed with patient.  Handouts provided questions answered to patient's satisfaction.  Previously reported the following symptomatology:     Right low axial back pain in the area of right sacroiliac joint.  Positive right-sided sacroiliac joint loading, positive Rony fingers test, positive Gaenslen's test.  Aching, nagging, indolent, stabbing, throbbing pain.  Additionally describes symptomatology of leg weakness and numbness that has been progressive.  The pain resembles neurogenic claudication.  No pertinent imaging available to review; however, pain with positive facet loading  maneuvers bilaterally.  5/5 strength in all extremities with active range of motion movements, negative SLR bilaterally.  He has failed more than 12 weeks of conservative therapy including physical and chiropractic therapy.  Reasonable at this time to proceed with multimodal pain therapy plan while obtaining MRI lumbar spine noncontrast to guide interventional therapy.    Plan  -Right L2 and Left L5 TFESI; f/u 2 weeks post procedure  -gabapentin 300 mg t.i.d. previously ordered for patient; has since tapered. counseled regarding sedative effects of taking this medication and provided up titration calendar.  Counseled not to take medication while driving or operating heavy machinery/using stairs  -DME TENS  -Meloxicam 7.5 mg b.i.d. prn pain previously prescribed. Patient takes infrequently, 1-2x a week; counseled to let us know if he takes it every day so gi ppx can be written. Patient educated regarding bleeding risk of taking this medication not taking any other nonsteroidal anti-inflammatory medications while taking this medication; counseled thoroughly regarding potential risk of Cardiovascular injury, Kidney injury, Gastrointestinal ulceration/bleeding. Patient voiced understanding  -physical therapy for right-sided lumbar radiculopathy, lumbar facet arthropathy; Core exercises additionally provided for physician directed home PT that the patient plans to participate with for 1 hour, twice a week for the next 6 weeks.     There are risks associated with opioid medications, including dependence, addiction and tolerance. The patient understands and agrees to use these medications only as prescribed. Potential side effects of the medications include, but are not limited to, constipation, drowsiness, addiction, impaired judgment and risk of fatal overdose if not taken as prescribed. The patient was warned against driving while taking sedation medications.  Sharing medications is a felony. At this point in time, the  patient is showing no signs of addiction, abuse, diversion or suicidal ideation.    [place] Prescription Drug Monitoring Program report was reviewed and was appropriate     Complete risks and benefits including bleeding, infection, tissue reaction, nerve injury and allergic reaction were discussed. The approach was demonstrated using models and literature was provided. Verbal and written consent was obtained.    My impressions and treatment recommendations were discussed in detail with the patient who verbalized understanding and had no further questions.  Discharge instructions were provided. I personally saw and examined the patient and I agree with the above discussed plan of care.    No orders of the defined types were placed in this encounter.      History of Present Illness    Greater than 90% relief of pain with improved ability to participate with IADLs after bilateral L3, L4, L5 medial branch blocks #1 and #2 and subsequent radiofrequency ablation performed 3/1/22; repeat performed 8/22/23 with equivocal relief. Previously described axial low back pain with aching, nagging, indolent, stabbing,, and characteristics.  Positive facet loading maneuvers concordant with prominent lumbar facet arthropathy seen throughout lumbar spine on MRI. Additionally with left sided L5 and Right L2 radicular pain where there is moderate left sided transforaminal stenosis at respective transforaminal regions of spine . Risks, benefits and alternatives to repeat BAMBI discussed with patient.  Handouts provided questions answered to patient's satisfaction.  Previously reported the following symptomatology:     Felix Grajeda is a 53 y.o. male with pmhx of migraines, HTN presenting with right low axial back pain described primarily arthritic features. He describes 8/10 right low back pain that is worse in the mornings and worse at the end of the day.  The pain is characterized by achy, nagging, indolent, crampy, stabbing pain in  his axial right low back in the region of the sacroiliac joint.  The patient describes that the pain is worse with standing for long periods of time on hard surfaces as well as with walking.  The patient is a very active individual and feels as though this pain compromises his  participation with independent activities of daily living. The pain can be debilitating at times and contribute to significant disability, compromising overall activity and independent activities of daily living. He has tried chiropractic and physical therapy with limited relief of symptoms.  Medications the patient has tried in the past include ibuprofen with limited relief. He describes   radicular symptoms in the bilateral L4 and L5 dermatomes but otherwise has good strength.  He endorses weakness numbness or paresthesias. The patient denies any bowel or bladder dysfunction as well. The patient works as a  and states that he has significant debilitation with independent activities of daily living and overall function.    Additionally describes neck pain with arthritic features; pain with lateral rotation/extesion flexion. Chiropractic and PT did help with this pain in the past as have nsaids.    I have personally reviewed and/or updated the patient's past medical history, past surgical history, family history, social history, current medications, allergies, and vital signs today.     Review of Systems   Constitutional:  Positive for activity change.   HENT: Negative.     Eyes: Negative.    Respiratory: Negative.     Cardiovascular: Negative.    Gastrointestinal: Negative.    Endocrine: Negative.    Genitourinary: Negative.    Musculoskeletal:  Positive for arthralgias, back pain, gait problem and myalgias.   Skin: Negative.    Allergic/Immunologic: Negative.    Neurological:  Positive for weakness and numbness.   Hematological: Negative.    Psychiatric/Behavioral: Negative.     All other systems reviewed and are  negative.      Patient Active Problem List   Diagnosis    Essential hypertension    Cellulitis of left lower extremity    Fever in adult    Lumbar spondylosis    Lumbar radiculopathy       Past Medical History:   Diagnosis Date    Arthritis     Hypertension     Migraines        Past Surgical History:   Procedure Laterality Date    FL GUIDED NEEDLE PLAC BX/ASP/INJ  3/1/2022    FL GUIDED NEEDLE PLAC BX/ASP/INJ  2022    NERVE BLOCK Bilateral 2021    Procedure: BLOCK MEDIAL BRANCH Bilateral L3, L4, L5 #1;  Surgeon: Fermín Lares MD;  Location: [facility];  Service: Pain Management     NERVE BLOCK Bilateral 2022    Procedure: BLOCK MEDIAL BRANCH Bilateral L3, L4, L5 #2;  Surgeon: Fermín Lares MD;  Location: [facility];  Service: Pain Management     AZ INJECT SI JOINT ARTHRGRPHY&/ANES/STEROID W/BETTYE Right 2021    Procedure: Interlaminar epidural steroid injection at L4-L5 ;  Surgeon: Fermín Lares MD;  Location: [facility];  Service: Pain Management     RHIZOTOMY Bilateral 3/1/2022    Procedure: RHIZOTOMY LUMBAR L3, L4, L5;  Surgeon: Fermín Lares MD;  Location: [facility];  Service: Pain Management     RHIZOTOMY Bilateral 2022    Procedure: RHIZOTOMY LUMBAR L3, L4, L5 medial branch nerves;  Surgeon: Fermín Lares MD;  Location: [facility];  Service: Pain Management     RHIZOTOMY Bilateral 2023    Procedure: RHIZOTOMY LUMBAR L3, L4, L5 medial branch nerves;  Surgeon: Fermín Lares MD;  Location: [facility];  Service: Pain Management     SHOULDER SURGERY Left        Family History   Problem Relation Age of Onset    Diabetes Mother     Migraines Mother     Hypertension Father        Social History     Occupational History    Not on file   Tobacco Use    Smoking status: Former     Current packs/day: 0.00     Average packs/day: 1 pack/day for 10.0 years (10.0 ttl pk-yrs)     Types: Cigarettes     Start date: 1990     Quit date: 2000     Years since quittin.6    Smokeless  "tobacco: Never   Vaping Use    Vaping status: Never Used   Substance and Sexual Activity    Alcohol use: Not Currently    Drug use: Yes     Types: Marijuana     Comment: Occasional marijuana    Sexual activity: Not on file       Current Outpatient Medications on File Prior to Visit   Medication Sig    hydrochlorothiazide (HYDRODIURIL) 50 mg tablet Take 50 mg by mouth daily    losartan (COZAAR) 50 mg tablet Take 50 mg by mouth daily    meloxicam (MOBIC) 7.5 mg tablet Take 1 tablet (7.5 mg total) by mouth 2 (two) times a day as needed for moderate pain    SUMAtriptan (IMITREX) 25 mg tablet      No current facility-administered medications on file prior to visit.       Allergies   Allergen Reactions    Penicillins Hives     Hives           Physical Exam    /78 (BP Location: Left arm, Patient Position: Sitting, Cuff Size: Adult)   Pulse (!) 51   Temp 97.5 °F (36.4 °C)   Ht 5' 11\" (1.803 m)   Wt 93.4 kg (206 lb)   SpO2 100%   BMI 28.73 kg/m²     Constitutional: normal, well developed, well nourished, alert, in no distress and non-toxic and no overt pain behavior. and overweight  Eyes: anicteric  HEENT: grossly intact  Neck: supple, symmetric, trachea midline and no masses   Pulmonary:even and unlabored  Cardiovascular:No edema or pitting edema present  Skin:Normal without rashes or lesions and well hydrated  Psychiatric:Mood and affect appropriate  Neurologic:Cranial Nerves II-XII grossly intact Sensation grossly intact; no clonus negative rodas's. Reflexes 2+ and brisk. SLR negative bilaterally.  Musculoskeletal:normal gait. 5/5 strength in all extremities with active range of motion movements bilaterally. Normal heel toe and tip toe walking.  pain with positive facet loading maneuvers bilaterally and with lateral spine rotation. No ttp over lumbar paraspinal muscles. Positive right-sided sacroiliac joint loading, positive Rony fingers test, positive Gaenslen's test.      Imaging    MRI LUMBAR SPINE " WITHOUT CONTRAST     INDICATION: M54.16: Radiculopathy, lumbar region.   Low back pain with numbness into the left leg.     COMPARISON:  None.     TECHNIQUE:  Sagittal T1, sagittal T2, sagittal inversion recovery, axial T1 and axial T2, coronal T2.    IMAGE QUALITY:  Diagnostic     FINDINGS:     VERTEBRAL BODIES:  There are 5 lumbar type vertebral bodies.  Levoscoliosis apex L1-2.  Slight retrolisthesis L1-2 also noted.  Modic type I degenerative marrow signal L1-2 eccentric to the right.     SACRUM:  Normal signal within the sacrum. No evidence of insufficiency or stress fracture.     DISTAL CORD AND CONUS:  Normal size and signal within the distal cord and conus.     PARASPINAL SOFT TISSUES:  Paraspinal soft tissues are unremarkable.     LOWER THORACIC DISC SPACES:  Normal disc height and signal.  No disc herniation, canal stenosis or foraminal narrowing.     LUMBAR DISC SPACES:     L1-L2:  Degenerative disc osteophyte complex with marginal osteophytes noted with a superimposed right paramedian protrusion type disc herniation.  There is moderate right lateral recess stenosis.  Correlate for right L2 radiculopathy.  Severe right and   mild left foraminal narrowing with mild central stenosis.     L2-L3:  Degenerative disc osteophyte complex with marginal osteophytes results in mild bilateral foraminal narrowing and mild central stenosis.  Mild/moderate left lateral recess stenosis.     L3-L4:  Mild annular bulge small marginal osteophytes result in moderate right and mild to moderate left foraminal narrowing with mild central stenosis.     L4-L5:  Moderate to severe left and mild right facet hypertrophy identified.  There is a disc osteophyte complex with marginal osteophytes resulting in moderate to severe left foraminal narrowing.  Correlate for left L4 radiculopathy.  Mild central   stenosis and moderate left lateral recess stenosis noted.     L5-S1:  Moderate facet hypertrophy identified.  There is annular  bulging and small marginal osteophytes resulting in mild right foraminal narrowing and minimal central stenosis.     IMPRESSION:     Spondylotic degenerative changes are seen throughout the lumbar spine.  There is right lateral recess stenosis at L1-2 secondary to right paramedian protrusion type disc herniation with marginal osteophyte contributing to severe right foraminal   narrowing.  Choroid for right L1 or right 2 radiculopathy.     Severe left foraminal narrowing noted at L4-5 secondary to spondylotic degenerative changes.  Correlate for left L4 radiculopathy.     Levoscoliosis noted with its apex at L1-2.  Degenerative changes are noted at remaining lumbar levels as described.

## 2024-01-12 NOTE — PROGRESS NOTES
Assessment  1. Lumbar radiculopathy  -     Case request operating room: BLOCK / INJECTION EPIDURAL STEROID LUMBAR     Left L5 and Right L2 Transforaminal epidural steroid injection; Standing  -     Case request operating room: BLOCK / INJECTION EPIDURAL STEROID LUMBAR     Left L5 and Right L2 Transforaminal epidural steroid injection    2. Lumbar spondylosis  -     Case request operating room: BLOCK / INJECTION EPIDURAL STEROID LUMBAR     Left L5 and Right L2 Transforaminal epidural steroid injection; Standing  -     Case request operating room: BLOCK / INJECTION EPIDURAL STEROID LUMBAR     Left L5 and Right L2 Transforaminal epidural steroid injection    Greater than 90% relief of pain with improved ability to participate with IADLs after bilateral L3, L4, L5 medial branch blocks #1 and #2 and subsequent radiofrequency ablation performed 3/1/22; repeat performed 8/22/23 with equivocal relief. Previously described axial low back pain with aching, nagging, indolent, stabbing,, and characteristics.  Positive facet loading maneuvers concordant with prominent lumbar facet arthropathy seen throughout lumbar spine on MRI. Additionally with left sided L5 and Right L2 radicular pain where there is moderate left sided transforaminal stenosis at respective transforaminal regions of spine . Risks, benefits and alternatives to repeat BAMBI discussed with patient.  Handouts provided questions answered to patient's satisfaction.  Previously reported the following symptomatology:     Right low axial back pain in the area of right sacroiliac joint.  Positive right-sided sacroiliac joint loading, positive Rony fingers test, positive Gaenslen's test.  Aching, nagging, indolent, stabbing, throbbing pain.  Additionally describes symptomatology of leg weakness and numbness that has been progressive.  The pain resembles neurogenic claudication.  No pertinent imaging available to review; however, pain with positive facet loading  maneuvers bilaterally.  5/5 strength in all extremities with active range of motion movements, negative SLR bilaterally.  He has failed more than 12 weeks of conservative therapy including physical and chiropractic therapy.  Reasonable at this time to proceed with multimodal pain therapy plan while obtaining MRI lumbar spine noncontrast to guide interventional therapy.    Plan  -Right L2 and Left L5 TFESI; f/u 2 weeks post procedure  -gabapentin 300 mg t.i.d. previously ordered for patient; has since tapered. counseled regarding sedative effects of taking this medication and provided up titration calendar.  Counseled not to take medication while driving or operating heavy machinery/using stairs  -DME TENS  -Meloxicam 7.5 mg b.i.d. prn pain previously prescribed. Patient takes infrequently, 1-2x a week; counseled to let us know if he takes it every day so gi ppx can be written. Patient educated regarding bleeding risk of taking this medication not taking any other nonsteroidal anti-inflammatory medications while taking this medication; counseled thoroughly regarding potential risk of Cardiovascular injury, Kidney injury, Gastrointestinal ulceration/bleeding. Patient voiced understanding  -physical therapy for right-sided lumbar radiculopathy, lumbar facet arthropathy; Core exercises additionally provided for physician directed home PT that the patient plans to participate with for 1 hour, twice a week for the next 6 weeks.     There are risks associated with opioid medications, including dependence, addiction and tolerance. The patient understands and agrees to use these medications only as prescribed. Potential side effects of the medications include, but are not limited to, constipation, drowsiness, addiction, impaired judgment and risk of fatal overdose if not taken as prescribed. The patient was warned against driving while taking sedation medications.  Sharing medications is a felony. At this point in time, the  patient is showing no signs of addiction, abuse, diversion or suicidal ideation.    [place] Prescription Drug Monitoring Program report was reviewed and was appropriate     Complete risks and benefits including bleeding, infection, tissue reaction, nerve injury and allergic reaction were discussed. The approach was demonstrated using models and literature was provided. Verbal and written consent was obtained.    My impressions and treatment recommendations were discussed in detail with the patient who verbalized understanding and had no further questions.  Discharge instructions were provided. I personally saw and examined the patient and I agree with the above discussed plan of care.    No orders of the defined types were placed in this encounter.      History of Present Illness    Greater than 90% relief of pain with improved ability to participate with IADLs after bilateral L3, L4, L5 medial branch blocks #1 and #2 and subsequent radiofrequency ablation performed 3/1/22; repeat performed 8/22/23 with equivocal relief. Previously described axial low back pain with aching, nagging, indolent, stabbing,, and characteristics.  Positive facet loading maneuvers concordant with prominent lumbar facet arthropathy seen throughout lumbar spine on MRI. Additionally with left sided L5 and Right L2 radicular pain where there is moderate left sided transforaminal stenosis at respective transforaminal regions of spine . Risks, benefits and alternatives to repeat BAMBI discussed with patient.  Handouts provided questions answered to patient's satisfaction.  Previously reported the following symptomatology:     Felix Grajeda is a 53 y.o. male with pmhx of migraines, HTN presenting with right low axial back pain described primarily arthritic features. He describes 8/10 right low back pain that is worse in the mornings and worse at the end of the day.  The pain is characterized by achy, nagging, indolent, crampy, stabbing pain in  his axial right low back in the region of the sacroiliac joint.  The patient describes that the pain is worse with standing for long periods of time on hard surfaces as well as with walking.  The patient is a very active individual and feels as though this pain compromises his  participation with independent activities of daily living. The pain can be debilitating at times and contribute to significant disability, compromising overall activity and independent activities of daily living. He has tried chiropractic and physical therapy with limited relief of symptoms.  Medications the patient has tried in the past include ibuprofen with limited relief. He describes   radicular symptoms in the bilateral L4 and L5 dermatomes but otherwise has good strength.  He endorses weakness numbness or paresthesias. The patient denies any bowel or bladder dysfunction as well. The patient works as a  and states that he has significant debilitation with independent activities of daily living and overall function.    Additionally describes neck pain with arthritic features; pain with lateral rotation/extesion flexion. Chiropractic and PT did help with this pain in the past as have nsaids.    I have personally reviewed and/or updated the patient's past medical history, past surgical history, family history, social history, current medications, allergies, and vital signs today.     Review of Systems   Constitutional:  Positive for activity change.   HENT: Negative.     Eyes: Negative.    Respiratory: Negative.     Cardiovascular: Negative.    Gastrointestinal: Negative.    Endocrine: Negative.    Genitourinary: Negative.    Musculoskeletal:  Positive for arthralgias, back pain, gait problem and myalgias.   Skin: Negative.    Allergic/Immunologic: Negative.    Neurological:  Positive for weakness and numbness.   Hematological: Negative.    Psychiatric/Behavioral: Negative.     All other systems reviewed and are  negative.      Patient Active Problem List   Diagnosis    Essential hypertension    Cellulitis of left lower extremity    Fever in adult    Lumbar spondylosis    Lumbar radiculopathy       Past Medical History:   Diagnosis Date    Arthritis     Hypertension     Migraines        Past Surgical History:   Procedure Laterality Date    FL GUIDED NEEDLE PLAC BX/ASP/INJ  3/1/2022    FL GUIDED NEEDLE PLAC BX/ASP/INJ  2022    NERVE BLOCK Bilateral 2021    Procedure: BLOCK MEDIAL BRANCH Bilateral L3, L4, L5 #1;  Surgeon: Fermín Lares MD;  Location: [facility];  Service: Pain Management     NERVE BLOCK Bilateral 2022    Procedure: BLOCK MEDIAL BRANCH Bilateral L3, L4, L5 #2;  Surgeon: Fermín Lares MD;  Location: [facility];  Service: Pain Management     MD INJECT SI JOINT ARTHRGRPHY&/ANES/STEROID W/BETTYE Right 2021    Procedure: Interlaminar epidural steroid injection at L4-L5 ;  Surgeon: Fermín Lares MD;  Location: [facility];  Service: Pain Management     RHIZOTOMY Bilateral 3/1/2022    Procedure: RHIZOTOMY LUMBAR L3, L4, L5;  Surgeon: Fermín Larse MD;  Location: [facility];  Service: Pain Management     RHIZOTOMY Bilateral 2022    Procedure: RHIZOTOMY LUMBAR L3, L4, L5 medial branch nerves;  Surgeon: Fermín Lares MD;  Location: [facility];  Service: Pain Management     RHIZOTOMY Bilateral 2023    Procedure: RHIZOTOMY LUMBAR L3, L4, L5 medial branch nerves;  Surgeon: Fermín Lares MD;  Location: [facility];  Service: Pain Management     SHOULDER SURGERY Left        Family History   Problem Relation Age of Onset    Diabetes Mother     Migraines Mother     Hypertension Father        Social History     Occupational History    Not on file   Tobacco Use    Smoking status: Former     Current packs/day: 0.00     Average packs/day: 1 pack/day for 10.0 years (10.0 ttl pk-yrs)     Types: Cigarettes     Start date: 1990     Quit date: 2000     Years since quittin.6    Smokeless  "tobacco: Never   Vaping Use    Vaping status: Never Used   Substance and Sexual Activity    Alcohol use: Not Currently    Drug use: Yes     Types: Marijuana     Comment: Occasional marijuana    Sexual activity: Not on file       Current Outpatient Medications on File Prior to Visit   Medication Sig    hydrochlorothiazide (HYDRODIURIL) 50 mg tablet Take 50 mg by mouth daily    losartan (COZAAR) 50 mg tablet Take 50 mg by mouth daily    meloxicam (MOBIC) 7.5 mg tablet Take 1 tablet (7.5 mg total) by mouth 2 (two) times a day as needed for moderate pain    SUMAtriptan (IMITREX) 25 mg tablet      No current facility-administered medications on file prior to visit.       Allergies   Allergen Reactions    Penicillins Hives     Hives           Physical Exam    /78 (BP Location: Left arm, Patient Position: Sitting, Cuff Size: Adult)   Pulse (!) 51   Temp 97.5 °F (36.4 °C)   Ht 5' 11\" (1.803 m)   Wt 93.4 kg (206 lb)   SpO2 100%   BMI 28.73 kg/m²     Constitutional: normal, well developed, well nourished, alert, in no distress and non-toxic and no overt pain behavior. and overweight  Eyes: anicteric  HEENT: grossly intact  Neck: supple, symmetric, trachea midline and no masses   Pulmonary:even and unlabored  Cardiovascular:No edema or pitting edema present  Skin:Normal without rashes or lesions and well hydrated  Psychiatric:Mood and affect appropriate  Neurologic:Cranial Nerves II-XII grossly intact Sensation grossly intact; no clonus negative rodas's. Reflexes 2+ and brisk. SLR negative bilaterally.  Musculoskeletal:normal gait. 5/5 strength in all extremities with active range of motion movements bilaterally. Normal heel toe and tip toe walking.  pain with positive facet loading maneuvers bilaterally and with lateral spine rotation. No ttp over lumbar paraspinal muscles. Positive right-sided sacroiliac joint loading, positive Rony fingers test, positive Gaenslen's test.      Imaging    MRI LUMBAR SPINE " WITHOUT CONTRAST     INDICATION: M54.16: Radiculopathy, lumbar region.   Low back pain with numbness into the left leg.     COMPARISON:  None.     TECHNIQUE:  Sagittal T1, sagittal T2, sagittal inversion recovery, axial T1 and axial T2, coronal T2.    IMAGE QUALITY:  Diagnostic     FINDINGS:     VERTEBRAL BODIES:  There are 5 lumbar type vertebral bodies.  Levoscoliosis apex L1-2.  Slight retrolisthesis L1-2 also noted.  Modic type I degenerative marrow signal L1-2 eccentric to the right.     SACRUM:  Normal signal within the sacrum. No evidence of insufficiency or stress fracture.     DISTAL CORD AND CONUS:  Normal size and signal within the distal cord and conus.     PARASPINAL SOFT TISSUES:  Paraspinal soft tissues are unremarkable.     LOWER THORACIC DISC SPACES:  Normal disc height and signal.  No disc herniation, canal stenosis or foraminal narrowing.     LUMBAR DISC SPACES:     L1-L2:  Degenerative disc osteophyte complex with marginal osteophytes noted with a superimposed right paramedian protrusion type disc herniation.  There is moderate right lateral recess stenosis.  Correlate for right L2 radiculopathy.  Severe right and   mild left foraminal narrowing with mild central stenosis.     L2-L3:  Degenerative disc osteophyte complex with marginal osteophytes results in mild bilateral foraminal narrowing and mild central stenosis.  Mild/moderate left lateral recess stenosis.     L3-L4:  Mild annular bulge small marginal osteophytes result in moderate right and mild to moderate left foraminal narrowing with mild central stenosis.     L4-L5:  Moderate to severe left and mild right facet hypertrophy identified.  There is a disc osteophyte complex with marginal osteophytes resulting in moderate to severe left foraminal narrowing.  Correlate for left L4 radiculopathy.  Mild central   stenosis and moderate left lateral recess stenosis noted.     L5-S1:  Moderate facet hypertrophy identified.  There is annular  bulging and small marginal osteophytes resulting in mild right foraminal narrowing and minimal central stenosis.     IMPRESSION:     Spondylotic degenerative changes are seen throughout the lumbar spine.  There is right lateral recess stenosis at L1-2 secondary to right paramedian protrusion type disc herniation with marginal osteophyte contributing to severe right foraminal   narrowing.  Choroid for right L1 or right 2 radiculopathy.     Severe left foraminal narrowing noted at L4-5 secondary to spondylotic degenerative changes.  Correlate for left L4 radiculopathy.     Levoscoliosis noted with its apex at L1-2.  Degenerative changes are noted at remaining lumbar levels as described.

## 2024-01-24 ENCOUNTER — PREP FOR PROCEDURE (OUTPATIENT)
Dept: PAIN MEDICINE | Facility: CLINIC | Age: 54
End: 2024-01-24

## 2024-01-24 DIAGNOSIS — M54.16 LUMBAR RADICULOPATHY: Primary | ICD-10-CM

## 2024-01-24 DIAGNOSIS — M47.816 LUMBAR SPONDYLOSIS: ICD-10-CM

## 2024-01-30 ENCOUNTER — HOSPITAL ENCOUNTER (OUTPATIENT)
Dept: INTERVENTIONAL RADIOLOGY/VASCULAR | Facility: HOSPITAL | Age: 54
Discharge: HOME | End: 2024-01-30
Attending: ANESTHESIOLOGY | Admitting: ANESTHESIOLOGY
Payer: COMMERCIAL

## 2024-01-30 VITALS
HEIGHT: 71 IN | DIASTOLIC BLOOD PRESSURE: 84 MMHG | BODY MASS INDEX: 28.84 KG/M2 | WEIGHT: 206 LBS | HEART RATE: 64 BPM | RESPIRATION RATE: 20 BRPM | OXYGEN SATURATION: 99 % | TEMPERATURE: 97.6 F | SYSTOLIC BLOOD PRESSURE: 142 MMHG

## 2024-01-30 DIAGNOSIS — M47.816 LUMBAR SPONDYLOSIS: ICD-10-CM

## 2024-01-30 DIAGNOSIS — M54.16 LUMBAR RADICULOPATHY: ICD-10-CM

## 2024-01-30 PROCEDURE — 64483 NJX AA&/STRD TFRM EPI L/S 1: CPT | Performed by: ANESTHESIOLOGY

## 2024-01-30 RX ORDER — LIDOCAINE HYDROCHLORIDE 10 MG/ML
INJECTION, SOLUTION EPIDURAL; INFILTRATION; INTRACAUDAL; PERINEURAL AS NEEDED
Status: COMPLETED | OUTPATIENT
Start: 2024-01-30 | End: 2024-01-30

## 2024-01-30 RX ORDER — BETAMETHASONE SODIUM PHOSPHATE AND BETAMETHASONE ACETATE 3; 3 MG/ML; MG/ML
INJECTION, SUSPENSION INTRA-ARTICULAR; INTRALESIONAL; INTRAMUSCULAR; SOFT TISSUE AS NEEDED
Status: COMPLETED | OUTPATIENT
Start: 2024-01-30 | End: 2024-01-30

## 2024-01-30 RX ADMIN — LIDOCAINE HYDROCHLORIDE 10 ML: 10 INJECTION, SOLUTION EPIDURAL; INFILTRATION; INTRACAUDAL; PERINEURAL at 12:53

## 2024-01-30 RX ADMIN — IOHEXOL 2 ML: 240 INJECTION, SOLUTION INTRATHECAL; INTRAVASCULAR; INTRAVENOUS; ORAL at 12:55

## 2024-01-30 RX ADMIN — BETAMETHASONE SODIUM PHOSPHATE AND BETAMETHASONE ACETATE 30 MG: 3; 3 INJECTION, SUSPENSION INTRA-ARTICULAR; INTRALESIONAL; INTRAMUSCULAR at 12:53

## 2024-01-30 NOTE — DISCHARGE INSTR - AVS FIRST PAGE
YOUR 2 WEEK FOLLOW UP HAS BEEN SCHEDULED; IF YOU WISH TO CHANGE THE FOLLOW UP, PLEASE CALL THE [facility] [place]: [phone]    Epidural Steroid Injection   WHAT YOU NEED TO KNOW:   An epidural steroid injection (BAMBI) is a procedure to inject steroid medicine into the epidural space. The epidural space is between your spinal cord and vertebrae. Steroids reduce inflammation and fluid buildup in your spine that may be causing pain. You may be given pain medicine along with the steroids.        DISCHARGE INSTRUCTIONS:   Call your local emergency number (911 in the [place]) if:   You have a seizure.    You have trouble moving your legs.    Seek care immediately if:   Blood soaks through your bandage.    You have a fever or chills, severe back pain, and the procedure area is sensitive to the touch.    You cannot control when you urinate or have a bowel movement.    Call your doctor if:   You have weakness or numbness in your legs.    Your wound is red, swollen, or draining pus.    You have nausea or are vomiting.    Your face or neck is red and you feel warm.    You have more pain than you had before the procedure.    You have swelling in your hands or feet.    You have questions or concerns about your condition or care.    Care for your wound as directed:  You may remove the bandage before you go to bed the day of your procedure. You may take a shower, but do not take a bath for at least 24 hours.   Self-care:   Do not drive,  use machines, or do strenuous activity for 24 hours after your procedure or as directed.     Continue other treatments  as directed. Steroid injections alone will not control your pain. The injections are meant to be used with other treatments, such as physical therapy.    Follow up with your doctor as directed:  Write down your questions so you remember to ask them during your visits.     EPIDURAL STEROID INJECTION DISCHARGE INSTRUCTIONS      ACTIVITY  Do not drive or operate  machinery today.  No strenuous activity today - bending, lifting, etc.   You may resume normal activities starting tomorrow - start slowly and as tolerated.  You may shower today, but not tub baths or hot tubs.  You may have numbness for several hours from the local anesthetics. Please use caution and common sense, especially with weight-bearing activities.    CARE OF THE INJECTION SITE  If you have soreness or pain apply ice to the area today (20 minutes on and 20 minutes off).  Starting tomorrow, you   Notify the [facility] if you have any of the following: redness, drainage, swelling or fever above 100°F.      MEDICATIONS  Continue to take all routine medications.  Our office may have instructed you to hold some medications. You may restart medications, including blood thinners.

## 2024-01-30 NOTE — INTERVAL H&P NOTE
H&P reviewed. After examining the patient I find no changes in the patients condition since the H&P had been written.    Vitals:    01/30/24 1226   BP: 151/98   Pulse: 68   Resp: 18   Temp: 97.5 °F (36.4 °C)   SpO2: 100%

## 2024-02-06 ENCOUNTER — TELEPHONE (OUTPATIENT)
Dept: PAIN MEDICINE | Facility: CLINIC | Age: 54
End: 2024-02-06

## 2024-02-21 PROBLEM — R50.9 FEVER IN ADULT: Status: RESOLVED | Noted: 2020-05-18 | Resolved: 2024-02-21

## 2024-07-09 ENCOUNTER — TELEPHONE (OUTPATIENT)
Age: 54
End: 2024-07-09

## 2024-07-09 NOTE — TELEPHONE ENCOUNTER
Caller: nancy Washington    Doctor: Luda     Reason for call: pt would like to schedule RFA,    Call back#: [phone]

## 2024-07-12 NOTE — TELEPHONE ENCOUNTER
S/w the patient  to clarify and he stated that his pain has returned in his LB.  He feels like it is the nerve pain. But then he was stating he had pain that radiated down into the leg, but it looks like he had an epidural previously. Would you like him to make an OVS for a reevaluation because it seems like he was confused as to the areas that were causing him the pain. Then he stated he had blocks done last October and normally the RFA follows the block. What do you think?

## 2024-08-05 ENCOUNTER — OFFICE VISIT (OUTPATIENT)
Dept: PAIN MEDICINE | Facility: CLINIC | Age: 54
End: 2024-08-05
Payer: COMMERCIAL

## 2024-08-05 ENCOUNTER — PREP FOR PROCEDURE (OUTPATIENT)
Dept: PAIN MEDICINE | Facility: CLINIC | Age: 54
End: 2024-08-05

## 2024-08-05 VITALS
WEIGHT: 206 LBS | OXYGEN SATURATION: 98 % | SYSTOLIC BLOOD PRESSURE: 112 MMHG | HEIGHT: 71 IN | DIASTOLIC BLOOD PRESSURE: 88 MMHG | BODY MASS INDEX: 28.84 KG/M2 | RESPIRATION RATE: 18 BRPM | HEART RATE: 68 BPM | TEMPERATURE: 98.1 F

## 2024-08-05 DIAGNOSIS — M47.816 LUMBAR SPONDYLOSIS: ICD-10-CM

## 2024-08-05 DIAGNOSIS — M54.16 LUMBAR RADICULOPATHY: Primary | ICD-10-CM

## 2024-08-05 PROCEDURE — 99214 OFFICE O/P EST MOD 30 MIN: CPT | Performed by: ANESTHESIOLOGY

## 2024-08-05 RX ORDER — PREGABALIN 50 MG/1
50 CAPSULE ORAL 3 TIMES DAILY
Qty: 90 CAPSULE | Refills: 2 | Status: SHIPPED | OUTPATIENT
Start: 2024-08-05

## 2024-08-05 NOTE — PROGRESS NOTES
Assessment  1. Lumbar radiculopathy  -     pregabalin (LYRICA) 50 mg capsule; Take 1 capsule (50 mg total) by mouth 3 (three) times a day  2. Lumbar spondylosis    Greater than 90% relief of pain with improved ability to participate with IADLs after bilateral L3, L4, L5 medial branch blocks #1 and #2 and subsequent radiofrequency ablation performed 3/1/22; repeat performed 8/22/23 with equivocal relief. Previously described axial low back pain with aching, nagging, indolent, stabbing,, and characteristics.  Positive facet loading maneuvers concordant with prominent lumbar facet arthropathy seen throughout lumbar spine on MRI. Now with left sided L5 and Right L2 radicular pain where there is moderate left sided transforaminal stenosis at respective transforaminal regions of spine. Prior injection provided greater than 80% relief of pain for more than 7 months before returning. Risks, benefits and alternatives to repeat BAMBI discussed with patient.  Handouts provided questions answered to patient's satisfaction.  Previously reported the following symptomatology:     Right low axial back pain in the area of right sacroiliac joint.  Positive right-sided sacroiliac joint loading, positive Rony fingers test, positive Gaenslen's test.  Aching, nagging, indolent, stabbing, throbbing pain.  Additionally describes symptomatology of leg weakness and numbness that has been progressive.  The pain resembles neurogenic claudication.  No pertinent imaging available to review; however, pain with positive facet loading maneuvers bilaterally.  5/5 strength in all extremities with active range of motion movements, negative SLR bilaterally.  He has failed more than 12 weeks of conservative therapy including physical and chiropractic therapy.  Reasonable at this time to proceed with multimodal pain therapy plan while obtaining MRI lumbar spine noncontrast to guide interventional therapy.    Plan  -Right L2 and Left L5 TFESI; f/u 2  weeks post procedure  -gabapentin transitioned to lyrica 50 mg t.i.d for patient given sedation with gabapentin. counseled regarding sedative effects of taking this medication and provided up titration calendar.  Counseled not to take medication while driving or operating heavy machinery/using stairs  -DME TENS  -Meloxicam 7.5 mg b.i.d. prn pain previously prescribed. Patient takes infrequently, 1-2x a week; counseled to let us know if he takes it every day so gi ppx can be written. Patient educated regarding bleeding risk of taking this medication not taking any other nonsteroidal anti-inflammatory medications while taking this medication; counseled thoroughly regarding potential risk of Cardiovascular injury, Kidney injury, Gastrointestinal ulceration/bleeding. Patient voiced understanding  -physical therapy for right-sided lumbar radiculopathy, lumbar facet arthropathy; Core exercises additionally provided for physician directed home PT that the patient plans to participate with for 1 hour, twice a week for the next 6 weeks.     There are risks associated with opioid medications, including dependence, addiction and tolerance. The patient understands and agrees to use these medications only as prescribed. Potential side effects of the medications include, but are not limited to, constipation, drowsiness, addiction, impaired judgment and risk of fatal overdose if not taken as prescribed. The patient was warned against driving while taking sedation medications.  Sharing medications is a felony. At this point in time, the patient is showing no signs of addiction, abuse, diversion or suicidal ideation.    [place] Prescription Drug Monitoring Program report was reviewed and was appropriate     Complete risks and benefits including bleeding, infection, tissue reaction, nerve injury and allergic reaction were discussed. The approach was demonstrated using models and literature was provided. Verbal and written  consent was obtained.    My impressions and treatment recommendations were discussed in detail with the patient who verbalized understanding and had no further questions.  Discharge instructions were provided. I personally saw and examined the patient and I agree with the above discussed plan of care.    No orders of the defined types were placed in this encounter.      History of Present Illness    Greater than 90% relief of pain with improved ability to participate with IADLs after bilateral L3, L4, L5 medial branch blocks #1 and #2 and subsequent radiofrequency ablation performed 3/1/22; repeat performed 8/22/23 with equivocal relief. Previously described axial low back pain with aching, nagging, indolent, stabbing,, and characteristics.  Positive facet loading maneuvers concordant with prominent lumbar facet arthropathy seen throughout lumbar spine on MRI. Additionally with left sided L5 and Right L2 radicular pain where there is moderate left sided transforaminal stenosis at respective transforaminal regions of spine. Prior injection provided greater than 80% relief of pain for more than 7 months before returning. Risks, benefits and alternatives to repeat BAMBI discussed with patient.  Handouts provided questions answered to patient's satisfaction.  Previously reported the following symptomatology:     Felix Grajeda is a 53 y.o. male with pmhx of migraines, HTN presenting with right low axial back pain described primarily arthritic features. He describes 8/10 right low back pain that is worse in the mornings and worse at the end of the day.  The pain is characterized by achy, nagging, indolent, crampy, stabbing pain in his axial right low back in the region of the sacroiliac joint.  The patient describes that the pain is worse with standing for long periods of time on hard surfaces as well as with walking.  The patient is a very active individual and feels as though this pain compromises his  participation with  independent activities of daily living. The pain can be debilitating at times and contribute to significant disability, compromising overall activity and independent activities of daily living. He has tried chiropractic and physical therapy with limited relief of symptoms.  Medications the patient has tried in the past include ibuprofen with limited relief. He describes   radicular symptoms in the bilateral L4 and L5 dermatomes but otherwise has good strength.  He endorses weakness numbness or paresthesias. The patient denies any bowel or bladder dysfunction as well. The patient works as a  and states that he has significant debilitation with independent activities of daily living and overall function.    Additionally describes neck pain with arthritic features; pain with lateral rotation/extesion flexion. Chiropractic and PT did help with this pain in the past as have nsaids.    I have personally reviewed and/or updated the patient's past medical history, past surgical history, family history, social history, current medications, allergies, and vital signs today.     Review of Systems   Constitutional:  Positive for activity change.   HENT: Negative.     Eyes: Negative.    Respiratory: Negative.     Cardiovascular: Negative.    Gastrointestinal: Negative.    Endocrine: Negative.    Genitourinary: Negative.    Musculoskeletal:  Positive for arthralgias, back pain, gait problem and myalgias.   Skin: Negative.    Allergic/Immunologic: Negative.    Neurological:  Positive for weakness and numbness.   Hematological: Negative.    Psychiatric/Behavioral: Negative.     All other systems reviewed and are negative.      Patient Active Problem List   Diagnosis    Essential hypertension    Cellulitis of left lower extremity    Lumbar spondylosis    Lumbar radiculopathy       Past Medical History:   Diagnosis Date    Arthritis     Hypertension     Migraines        Past Surgical History:   Procedure  Laterality Date    FL GUIDED NEEDLE PLAC BX/ASP/INJ  3/1/2022    FL GUIDED NEEDLE PLAC BX/ASP/INJ  2022    IR SPINE AND PAIN PROCEDURE  2024    NERVE BLOCK Bilateral 2021    Procedure: BLOCK MEDIAL BRANCH Bilateral L3, L4, L5 #1;  Surgeon: Fermín Lares MD;  Location: [facility];  Service: Pain Management     NERVE BLOCK Bilateral 2022    Procedure: BLOCK MEDIAL BRANCH Bilateral L3, L4, L5 #2;  Surgeon: Fermín Lares MD;  Location: [facility];  Service: Pain Management     MI INJECT SI JOINT ARTHRGRPHY&/ANES/STEROID W/BETTYE Right 2021    Procedure: Interlaminar epidural steroid injection at L4-L5 ;  Surgeon: Fermín Lares MD;  Location: [facility];  Service: Pain Management     RHIZOTOMY Bilateral 3/1/2022    Procedure: RHIZOTOMY LUMBAR L3, L4, L5;  Surgeon: Fermín Lares MD;  Location: [facility];  Service: Pain Management     RHIZOTOMY Bilateral 2022    Procedure: RHIZOTOMY LUMBAR L3, L4, L5 medial branch nerves;  Surgeon: Fermín Lares MD;  Location: [facility];  Service: Pain Management     RHIZOTOMY Bilateral 2023    Procedure: RHIZOTOMY LUMBAR L3, L4, L5 medial branch nerves;  Surgeon: Fermín Lares MD;  Location: [facility];  Service: Pain Management     SHOULDER SURGERY Left        Family History   Problem Relation Age of Onset    Diabetes Mother     Migraines Mother     Hypertension Father        Social History     Occupational History    Not on file   Tobacco Use    Smoking status: Former     Current packs/day: 0.00     Average packs/day: 1 pack/day for 10.0 years (10.0 ttl pk-yrs)     Types: Cigarettes     Start date: 1990     Quit date: 2000     Years since quittin.2    Smokeless tobacco: Never   Vaping Use    Vaping status: Never Used   Substance and Sexual Activity    Alcohol use: Not Currently    Drug use: Yes     Types: Marijuana     Comment: Occasional marijuana    Sexual activity: Not on file       Current Outpatient Medications on File Prior  "to Visit   Medication Sig    hydrochlorothiazide (HYDRODIURIL) 50 mg tablet Take 50 mg by mouth daily    losartan (COZAAR) 50 mg tablet Take 50 mg by mouth daily    meloxicam (MOBIC) 7.5 mg tablet Take 1 tablet (7.5 mg total) by mouth 2 (two) times a day as needed for moderate pain    SUMAtriptan (IMITREX) 25 mg tablet      No current facility-administered medications on file prior to visit.       Allergies   Allergen Reactions    Penicillins Hives     Hives           Physical Exam    /88 (BP Location: Left arm, Patient Position: Sitting, Cuff Size: Adult)   Pulse 68   Temp 98.1 °F (36.7 °C)   Resp 18   Ht 5' 11\" (1.803 m)   Wt 93.4 kg (206 lb)   SpO2 98%   BMI 28.73 kg/m²     Constitutional: normal, well developed, well nourished, alert, in no distress and non-toxic and no overt pain behavior. and overweight  Eyes: anicteric  HEENT: grossly intact  Neck: supple, symmetric, trachea midline and no masses   Pulmonary:even and unlabored  Cardiovascular:No edema or pitting edema present  Skin:Normal without rashes or lesions and well hydrated  Psychiatric:Mood and affect appropriate  Neurologic:Cranial Nerves II-XII grossly intact Sensation grossly intact; no clonus negative rodas's. Reflexes 2+ and brisk. SLR negative bilaterally.  Musculoskeletal:normal gait. 5/5 strength in all extremities with active range of motion movements bilaterally. Normal heel toe and tip toe walking.  pain with positive facet loading maneuvers bilaterally and with lateral spine rotation. No ttp over lumbar paraspinal muscles. Positive right-sided sacroiliac joint loading, positive Rony fingers test, positive Gaenslen's test.      Imaging    MRI LUMBAR SPINE WITHOUT CONTRAST     INDICATION: M54.16: Radiculopathy, lumbar region.   Low back pain with numbness into the left leg.     COMPARISON:  None.     TECHNIQUE:  Sagittal T1, sagittal T2, sagittal inversion recovery, axial T1 and axial T2, coronal T2.    IMAGE QUALITY:  " Diagnostic     FINDINGS:     VERTEBRAL BODIES:  There are 5 lumbar type vertebral bodies.  Levoscoliosis apex L1-2.  Slight retrolisthesis L1-2 also noted.  Modic type I degenerative marrow signal L1-2 eccentric to the right.     SACRUM:  Normal signal within the sacrum. No evidence of insufficiency or stress fracture.     DISTAL CORD AND CONUS:  Normal size and signal within the distal cord and conus.     PARASPINAL SOFT TISSUES:  Paraspinal soft tissues are unremarkable.     LOWER THORACIC DISC SPACES:  Normal disc height and signal.  No disc herniation, canal stenosis or foraminal narrowing.     LUMBAR DISC SPACES:     L1-L2:  Degenerative disc osteophyte complex with marginal osteophytes noted with a superimposed right paramedian protrusion type disc herniation.  There is moderate right lateral recess stenosis.  Correlate for right L2 radiculopathy.  Severe right and   mild left foraminal narrowing with mild central stenosis.     L2-L3:  Degenerative disc osteophyte complex with marginal osteophytes results in mild bilateral foraminal narrowing and mild central stenosis.  Mild/moderate left lateral recess stenosis.     L3-L4:  Mild annular bulge small marginal osteophytes result in moderate right and mild to moderate left foraminal narrowing with mild central stenosis.     L4-L5:  Moderate to severe left and mild right facet hypertrophy identified.  There is a disc osteophyte complex with marginal osteophytes resulting in moderate to severe left foraminal narrowing.  Correlate for left L4 radiculopathy.  Mild central   stenosis and moderate left lateral recess stenosis noted.     L5-S1:  Moderate facet hypertrophy identified.  There is annular bulging and small marginal osteophytes resulting in mild right foraminal narrowing and minimal central stenosis.     IMPRESSION:     Spondylotic degenerative changes are seen throughout the lumbar spine.  There is right lateral recess stenosis at L1-2 secondary to right  paramedian protrusion type disc herniation with marginal osteophyte contributing to severe right foraminal   narrowing.  Choroid for right L1 or right 2 radiculopathy.     Severe left foraminal narrowing noted at L4-5 secondary to spondylotic degenerative changes.  Correlate for left L4 radiculopathy.     Levoscoliosis noted with its apex at L1-2.  Degenerative changes are noted at remaining lumbar levels as described.

## 2024-08-05 NOTE — PATIENT INSTRUCTIONS
Patient Education     Pregabalin (pre SHAYNA a antony)   Brand Names: US Lyrica; Lyrica CR   Brand Names: Jewel ACH-Pregabalin; AG-Pregabalin; APO-Pregabalin; Auro-Pregabalin; DOM-Pregabalin; JAMP-Pregabalin; Lyrica; M-Pregabalin; Mar-Pregabalin; MINT-Pregabalin; ANN-MARIE-Pregabalin; NRA-Pregabalin; PMS-Pregabalin; CANDI-Pregabalin; SANDOZ Pregabalin; TARO-Pregabalin; TEVA-Pregabalin   What is this drug used for?   It is used to help control certain kinds of seizures.  It is used to treat painful nerve diseases.  It is used to treat fibromyalgia.  It may be given to you for other reasons. Talk with the doctor.  What do I need to tell my doctor BEFORE I take this drug?   If you are allergic to this drug; any part of this drug; or any other drugs, foods, or substances. Tell your doctor about the allergy and what signs you had.  If you have kidney disease.  If you are breast-feeding. Do not breast-feed while you take this drug.  This is not a list of all drugs or health problems that interact with this drug.  Tell your doctor and pharmacist about all of your drugs (prescription or OTC, natural products, vitamins) and health problems. You must check to make sure that it is safe for you to take this drug with all of your drugs and health problems. Do not start, stop, or change the dose of any drug without checking with your doctor.  What are some things I need to know or do while I take this drug?   Tell all of your health care providers that you take this drug. This includes your doctors, nurses, pharmacists, and dentists.  Avoid driving and doing other tasks or actions that call for you to be alert or have clear eyesight until you see how this drug affects you.  If seizures are different or worse after starting this drug, talk with the doctor.  Do not stop taking this drug all of a sudden without calling your doctor. You may have a greater risk of side effects. If you need to stop this drug, you will want to slowly stop it as  ordered by your doctor.  Avoid drinking alcohol while taking this drug.  Talk with your doctor before you use marijuana, other forms of cannabis, or prescription or OTC drugs that may slow your actions.  A very bad reaction called angioedema has happened with this drug. Sometimes, this may be life-threatening. Signs may include swelling of the hands, face, lips, eyes, tongue, or throat; trouble breathing; trouble swallowing; or unusual hoarseness. Get medical help right away if you have any of these signs.  Severe breathing problems have happened with this drug in people taking certain other drugs (like opioid pain drugs). This has also happened in people who already have lung or breathing problems. The risk may also be greater in people who are older than 65. Sometimes, breathing problems have been deadly. If you have questions, talk with the doctor.  If you are 65 or older, use this drug with care. You could have more side effects.  Talk with your doctor if you plan to father a child. This drug made male animals less fertile and caused sperm changes. Birth defects also happened in the young of male animals treated with this drug. It is not known if these problems happen in humans.  Tell your doctor if you are pregnant or plan on getting pregnant. You will need to talk about the benefits and risks of using this drug while you are pregnant.  What are some side effects that I need to call my doctor about right away?   WARNING/CAUTION: Even though it may be rare, some people may have very bad and sometimes deadly side effects when taking a drug. Tell your doctor or get medical help right away if you have any of the following signs or symptoms that may be related to a very bad side effect:  Signs of an allergic reaction, like rash; hives; itching; red, swollen, blistered, or peeling skin with or without fever; wheezing; tightness in the chest or throat; trouble breathing, swallowing, or talking; unusual hoarseness; or  swelling of the mouth, face, lips, tongue, or throat.  Change in eyesight.  Muscle pain or weakness.  Change in balance.  Feeling confused.  Shakiness.  Trouble breathing, slow breathing, or shallow breathing.  Blue or gray color of the skin, lips, nail beds, fingers, or toes.  Memory problems or loss.  Shortness of breath, a big weight gain, or swelling in the arms or legs.  Fast or abnormal heartbeat.  Fever, chills, or sore throat.  Skin sores.  Any skin change.  Trouble speaking.  Trouble sleeping.  Trouble walking.  Feeling high (easy laughing and feeling good).  Twitching.  Get medical help right away if you feel very sleepy, very dizzy, or if you pass out. Caregivers or others need to get medical help right away if the patient does not respond, does not answer or react like normal, or will not wake up.  Like other drugs that may be used for seizures, this drug may rarely raise the risk of suicidal thoughts or actions. The risk may be higher in people who have had suicidal thoughts or actions in the past. Call the doctor right away about any new or worse signs like depression; feeling nervous, restless, or grouchy; panic attacks; or other changes in mood or behavior. Call the doctor right away if any suicidal thoughts or actions occur.  Low platelet counts have rarely happened with this drug. This may lead to a higher chance of bleeding. Call your doctor right away if you have any unexplained bruising or bleeding.  What are some other side effects of this drug?   All drugs may cause side effects. However, many people have no side effects or only have minor side effects. Call your doctor or get medical help if any of these side effects or any other side effects bother you or do not go away:  Feeling dizzy, sleepy, tired, or weak.  Weight gain.  Not able to focus.  Headache.  Dry mouth.  Constipation.  Increased appetite.  Upset stomach.  Joint pain.  Nose or throat irritation.  These are not all of the side  effects that may occur. If you have questions about side effects, call your doctor. Call your doctor for medical advice about side effects.  You may report side effects to your national health agency.  You may report side effects to the FDA at 1-740.530.6836. You may also report side effects at https://www.fda.gov/medwatch.  How is this drug best taken?   Use this drug as ordered by your doctor. Read all information given to you. Follow all instructions closely.  Extended-release tablets:   Take after the evening meal if taking once daily.  Swallow whole. Do not chew, break, or crush.  Keep taking this drug as you have been told by your doctor or other health care provider, even if you feel well.  Capsules and oral solution:   Take with or without food.  Keep taking this drug as you have been told by your doctor or other health care provider, even if you feel well.  Oral solution:   Measure liquid doses carefully. Use the measuring device that comes with this drug. If there is none, ask the pharmacist for a device to measure this drug.  What do I do if I miss a dose?   Extended-release tablets:   Take a missed dose just before bedtime after eating a snack or after the next day's morning meal.  If you miss taking the missed dose after the next day's morning meal, skip the missed dose and go back to your normal time.  Do not take 2 doses at the same time or extra doses.  Capsules and oral solution:   Take a missed dose as soon as you think about it.  If it is close to the time for your next dose, skip the missed dose and go back to your normal time.  Do not take 2 doses at the same time or extra doses.  How do I store and/or throw out this drug?   Store in the original container at room temperature.  Store in a dry place. Do not store in a bathroom.  Store this drug in a safe place where children cannot see or reach it, and where other people cannot get to it. A locked box or area may help keep this drug safe. Keep  all drugs away from pets.  Throw away unused or  drugs. Do not flush down a toilet or pour down a drain unless you are told to do so. Check with your pharmacist if you have questions about the best way to throw out drugs. There may be drug take-back programs in your area.  General drug facts   If your symptoms or health problems do not get better or if they become worse, call your doctor.  Do not share your drugs with others and do not take anyone else's drugs.  Some drugs may have another patient information leaflet. If you have any questions about this drug, please talk with your doctor, nurse, pharmacist, or other health care provider.  This drug comes with an extra patient fact sheet called a Medication Guide. Read it with care. Read it again each time this drug is refilled. If you have any questions about this drug, please talk with the doctor, pharmacist, or other health care provider.  If you think there has been an overdose, call your poison control center or get medical care right away. Be ready to tell or show what was taken, how much, and when it happened.  Consumer Information Use and Disclaimer   This generalized information is a limited summary of diagnosis, treatment, and/or medication information. It is not meant to be comprehensive and should be used as a tool to help the user understand and/or assess potential diagnostic and treatment options. It does NOT include all information about conditions, treatments, medications, side effects, or risks that may apply to a specific patient. It is not intended to be medical advice or a substitute for the medical advice, diagnosis, or treatment of a health care provider based on the health care provider's examination and assessment of a patient's specific and unique circumstances. Patients must speak with a health care provider for complete information about their health, medical questions, and treatment options, including any risks or benefits  regarding use of medications. This information does not endorse any treatments or medications as safe, effective, or approved for treating a specific patient. [employer] and its affiliates disclaim any warranty or liability relating to this information or the use thereof. The use of this information is governed by the Terms of Use, available at https://www.[employer].com/en/know/clinical-effectiveness-terms.  Last Reviewed Date   2023-12-21  Copyright   © 2024 [employer] and its affiliates and/or licensors. All rights reserved.

## 2024-08-05 NOTE — H&P (VIEW-ONLY)
Assessment  1. Lumbar radiculopathy  -     pregabalin (LYRICA) 50 mg capsule; Take 1 capsule (50 mg total) by mouth 3 (three) times a day  2. Lumbar spondylosis    Greater than 90% relief of pain with improved ability to participate with IADLs after bilateral L3, L4, L5 medial branch blocks #1 and #2 and subsequent radiofrequency ablation performed 3/1/22; repeat performed 8/22/23 with equivocal relief. Previously described axial low back pain with aching, nagging, indolent, stabbing,, and characteristics.  Positive facet loading maneuvers concordant with prominent lumbar facet arthropathy seen throughout lumbar spine on MRI. Now with left sided L5 and Right L2 radicular pain where there is moderate left sided transforaminal stenosis at respective transforaminal regions of spine. Prior injection provided greater than 80% relief of pain for more than 7 months before returning. Risks, benefits and alternatives to repeat BAMBI discussed with patient.  Handouts provided questions answered to patient's satisfaction.  Previously reported the following symptomatology:     Right low axial back pain in the area of right sacroiliac joint.  Positive right-sided sacroiliac joint loading, positive Rony fingers test, positive Gaenslen's test.  Aching, nagging, indolent, stabbing, throbbing pain.  Additionally describes symptomatology of leg weakness and numbness that has been progressive.  The pain resembles neurogenic claudication.  No pertinent imaging available to review; however, pain with positive facet loading maneuvers bilaterally.  5/5 strength in all extremities with active range of motion movements, negative SLR bilaterally.  He has failed more than 12 weeks of conservative therapy including physical and chiropractic therapy.  Reasonable at this time to proceed with multimodal pain therapy plan while obtaining MRI lumbar spine noncontrast to guide interventional therapy.    Plan  -Right L2 and Left L5 TFESI; f/u 2  weeks post procedure  -gabapentin transitioned to lyrica 50 mg t.i.d for patient given sedation with gabapentin. counseled regarding sedative effects of taking this medication and provided up titration calendar.  Counseled not to take medication while driving or operating heavy machinery/using stairs  -DME TENS  -Meloxicam 7.5 mg b.i.d. prn pain previously prescribed. Patient takes infrequently, 1-2x a week; counseled to let us know if he takes it every day so gi ppx can be written. Patient educated regarding bleeding risk of taking this medication not taking any other nonsteroidal anti-inflammatory medications while taking this medication; counseled thoroughly regarding potential risk of Cardiovascular injury, Kidney injury, Gastrointestinal ulceration/bleeding. Patient voiced understanding  -physical therapy for right-sided lumbar radiculopathy, lumbar facet arthropathy; Core exercises additionally provided for physician directed home PT that the patient plans to participate with for 1 hour, twice a week for the next 6 weeks.     There are risks associated with opioid medications, including dependence, addiction and tolerance. The patient understands and agrees to use these medications only as prescribed. Potential side effects of the medications include, but are not limited to, constipation, drowsiness, addiction, impaired judgment and risk of fatal overdose if not taken as prescribed. The patient was warned against driving while taking sedation medications.  Sharing medications is a felony. At this point in time, the patient is showing no signs of addiction, abuse, diversion or suicidal ideation.    [place] Prescription Drug Monitoring Program report was reviewed and was appropriate     Complete risks and benefits including bleeding, infection, tissue reaction, nerve injury and allergic reaction were discussed. The approach was demonstrated using models and literature was provided. Verbal and written  consent was obtained.    My impressions and treatment recommendations were discussed in detail with the patient who verbalized understanding and had no further questions.  Discharge instructions were provided. I personally saw and examined the patient and I agree with the above discussed plan of care.    No orders of the defined types were placed in this encounter.      History of Present Illness    Greater than 90% relief of pain with improved ability to participate with IADLs after bilateral L3, L4, L5 medial branch blocks #1 and #2 and subsequent radiofrequency ablation performed 3/1/22; repeat performed 8/22/23 with equivocal relief. Previously described axial low back pain with aching, nagging, indolent, stabbing,, and characteristics.  Positive facet loading maneuvers concordant with prominent lumbar facet arthropathy seen throughout lumbar spine on MRI. Additionally with left sided L5 and Right L2 radicular pain where there is moderate left sided transforaminal stenosis at respective transforaminal regions of spine. Prior injection provided greater than 80% relief of pain for more than 7 months before returning. Risks, benefits and alternatives to repeat BAMBI discussed with patient.  Handouts provided questions answered to patient's satisfaction.  Previously reported the following symptomatology:     Felix Grajeda is a 53 y.o. male with pmhx of migraines, HTN presenting with right low axial back pain described primarily arthritic features. He describes 8/10 right low back pain that is worse in the mornings and worse at the end of the day.  The pain is characterized by achy, nagging, indolent, crampy, stabbing pain in his axial right low back in the region of the sacroiliac joint.  The patient describes that the pain is worse with standing for long periods of time on hard surfaces as well as with walking.  The patient is a very active individual and feels as though this pain compromises his  participation with  independent activities of daily living. The pain can be debilitating at times and contribute to significant disability, compromising overall activity and independent activities of daily living. He has tried chiropractic and physical therapy with limited relief of symptoms.  Medications the patient has tried in the past include ibuprofen with limited relief. He describes   radicular symptoms in the bilateral L4 and L5 dermatomes but otherwise has good strength.  He endorses weakness numbness or paresthesias. The patient denies any bowel or bladder dysfunction as well. The patient works as a  and states that he has significant debilitation with independent activities of daily living and overall function.    Additionally describes neck pain with arthritic features; pain with lateral rotation/extesion flexion. Chiropractic and PT did help with this pain in the past as have nsaids.    I have personally reviewed and/or updated the patient's past medical history, past surgical history, family history, social history, current medications, allergies, and vital signs today.     Review of Systems   Constitutional:  Positive for activity change.   HENT: Negative.     Eyes: Negative.    Respiratory: Negative.     Cardiovascular: Negative.    Gastrointestinal: Negative.    Endocrine: Negative.    Genitourinary: Negative.    Musculoskeletal:  Positive for arthralgias, back pain, gait problem and myalgias.   Skin: Negative.    Allergic/Immunologic: Negative.    Neurological:  Positive for weakness and numbness.   Hematological: Negative.    Psychiatric/Behavioral: Negative.     All other systems reviewed and are negative.      Patient Active Problem List   Diagnosis    Essential hypertension    Cellulitis of left lower extremity    Lumbar spondylosis    Lumbar radiculopathy       Past Medical History:   Diagnosis Date    Arthritis     Hypertension     Migraines        Past Surgical History:   Procedure  Laterality Date    FL GUIDED NEEDLE PLAC BX/ASP/INJ  3/1/2022    FL GUIDED NEEDLE PLAC BX/ASP/INJ  2022    IR SPINE AND PAIN PROCEDURE  2024    NERVE BLOCK Bilateral 2021    Procedure: BLOCK MEDIAL BRANCH Bilateral L3, L4, L5 #1;  Surgeon: Fermín Lares MD;  Location: [facility];  Service: Pain Management     NERVE BLOCK Bilateral 2022    Procedure: BLOCK MEDIAL BRANCH Bilateral L3, L4, L5 #2;  Surgeon: Fermín Lares MD;  Location: [facility];  Service: Pain Management     CT INJECT SI JOINT ARTHRGRPHY&/ANES/STEROID W/BETTYE Right 2021    Procedure: Interlaminar epidural steroid injection at L4-L5 ;  Surgeon: Fermín Lares MD;  Location: [facility];  Service: Pain Management     RHIZOTOMY Bilateral 3/1/2022    Procedure: RHIZOTOMY LUMBAR L3, L4, L5;  Surgeon: Fermín Lares MD;  Location: [facility];  Service: Pain Management     RHIZOTOMY Bilateral 2022    Procedure: RHIZOTOMY LUMBAR L3, L4, L5 medial branch nerves;  Surgeon: Fermín Lares MD;  Location: [facility];  Service: Pain Management     RHIZOTOMY Bilateral 2023    Procedure: RHIZOTOMY LUMBAR L3, L4, L5 medial branch nerves;  Surgeon: Fermín Lares MD;  Location: [facility];  Service: Pain Management     SHOULDER SURGERY Left        Family History   Problem Relation Age of Onset    Diabetes Mother     Migraines Mother     Hypertension Father        Social History     Occupational History    Not on file   Tobacco Use    Smoking status: Former     Current packs/day: 0.00     Average packs/day: 1 pack/day for 10.0 years (10.0 ttl pk-yrs)     Types: Cigarettes     Start date: 1990     Quit date: 2000     Years since quittin.2    Smokeless tobacco: Never   Vaping Use    Vaping status: Never Used   Substance and Sexual Activity    Alcohol use: Not Currently    Drug use: Yes     Types: Marijuana     Comment: Occasional marijuana    Sexual activity: Not on file       Current Outpatient Medications on File Prior  "to Visit   Medication Sig    hydrochlorothiazide (HYDRODIURIL) 50 mg tablet Take 50 mg by mouth daily    losartan (COZAAR) 50 mg tablet Take 50 mg by mouth daily    meloxicam (MOBIC) 7.5 mg tablet Take 1 tablet (7.5 mg total) by mouth 2 (two) times a day as needed for moderate pain    SUMAtriptan (IMITREX) 25 mg tablet      No current facility-administered medications on file prior to visit.       Allergies   Allergen Reactions    Penicillins Hives     Hives           Physical Exam    /88 (BP Location: Left arm, Patient Position: Sitting, Cuff Size: Adult)   Pulse 68   Temp 98.1 °F (36.7 °C)   Resp 18   Ht 5' 11\" (1.803 m)   Wt 93.4 kg (206 lb)   SpO2 98%   BMI 28.73 kg/m²     Constitutional: normal, well developed, well nourished, alert, in no distress and non-toxic and no overt pain behavior. and overweight  Eyes: anicteric  HEENT: grossly intact  Neck: supple, symmetric, trachea midline and no masses   Pulmonary:even and unlabored  Cardiovascular:No edema or pitting edema present  Skin:Normal without rashes or lesions and well hydrated  Psychiatric:Mood and affect appropriate  Neurologic:Cranial Nerves II-XII grossly intact Sensation grossly intact; no clonus negative rodas's. Reflexes 2+ and brisk. SLR negative bilaterally.  Musculoskeletal:normal gait. 5/5 strength in all extremities with active range of motion movements bilaterally. Normal heel toe and tip toe walking.  pain with positive facet loading maneuvers bilaterally and with lateral spine rotation. No ttp over lumbar paraspinal muscles. Positive right-sided sacroiliac joint loading, positive Rony fingers test, positive Gaenslen's test.      Imaging    MRI LUMBAR SPINE WITHOUT CONTRAST     INDICATION: M54.16: Radiculopathy, lumbar region.   Low back pain with numbness into the left leg.     COMPARISON:  None.     TECHNIQUE:  Sagittal T1, sagittal T2, sagittal inversion recovery, axial T1 and axial T2, coronal T2.    IMAGE QUALITY:  " Diagnostic     FINDINGS:     VERTEBRAL BODIES:  There are 5 lumbar type vertebral bodies.  Levoscoliosis apex L1-2.  Slight retrolisthesis L1-2 also noted.  Modic type I degenerative marrow signal L1-2 eccentric to the right.     SACRUM:  Normal signal within the sacrum. No evidence of insufficiency or stress fracture.     DISTAL CORD AND CONUS:  Normal size and signal within the distal cord and conus.     PARASPINAL SOFT TISSUES:  Paraspinal soft tissues are unremarkable.     LOWER THORACIC DISC SPACES:  Normal disc height and signal.  No disc herniation, canal stenosis or foraminal narrowing.     LUMBAR DISC SPACES:     L1-L2:  Degenerative disc osteophyte complex with marginal osteophytes noted with a superimposed right paramedian protrusion type disc herniation.  There is moderate right lateral recess stenosis.  Correlate for right L2 radiculopathy.  Severe right and   mild left foraminal narrowing with mild central stenosis.     L2-L3:  Degenerative disc osteophyte complex with marginal osteophytes results in mild bilateral foraminal narrowing and mild central stenosis.  Mild/moderate left lateral recess stenosis.     L3-L4:  Mild annular bulge small marginal osteophytes result in moderate right and mild to moderate left foraminal narrowing with mild central stenosis.     L4-L5:  Moderate to severe left and mild right facet hypertrophy identified.  There is a disc osteophyte complex with marginal osteophytes resulting in moderate to severe left foraminal narrowing.  Correlate for left L4 radiculopathy.  Mild central   stenosis and moderate left lateral recess stenosis noted.     L5-S1:  Moderate facet hypertrophy identified.  There is annular bulging and small marginal osteophytes resulting in mild right foraminal narrowing and minimal central stenosis.     IMPRESSION:     Spondylotic degenerative changes are seen throughout the lumbar spine.  There is right lateral recess stenosis at L1-2 secondary to right  paramedian protrusion type disc herniation with marginal osteophyte contributing to severe right foraminal   narrowing.  Choroid for right L1 or right 2 radiculopathy.     Severe left foraminal narrowing noted at L4-5 secondary to spondylotic degenerative changes.  Correlate for left L4 radiculopathy.     Levoscoliosis noted with its apex at L1-2.  Degenerative changes are noted at remaining lumbar levels as described.

## 2024-08-20 ENCOUNTER — HOSPITAL ENCOUNTER (OUTPATIENT)
Dept: INTERVENTIONAL RADIOLOGY/VASCULAR | Facility: HOSPITAL | Age: 54
Discharge: HOME | End: 2024-08-20
Attending: ANESTHESIOLOGY
Payer: COMMERCIAL

## 2024-08-20 VITALS
SYSTOLIC BLOOD PRESSURE: 140 MMHG | WEIGHT: 206 LBS | HEIGHT: 71 IN | HEART RATE: 65 BPM | RESPIRATION RATE: 16 BRPM | BODY MASS INDEX: 28.84 KG/M2 | DIASTOLIC BLOOD PRESSURE: 95 MMHG | TEMPERATURE: 97.6 F | OXYGEN SATURATION: 100 %

## 2024-08-20 DIAGNOSIS — M54.16 LUMBAR RADICULOPATHY: ICD-10-CM

## 2024-08-20 PROCEDURE — 64484 NJX AA&/STRD TFRM EPI L/S EA: CPT | Performed by: ANESTHESIOLOGY

## 2024-08-20 PROCEDURE — 64483 NJX AA&/STRD TFRM EPI L/S 1: CPT | Performed by: ANESTHESIOLOGY

## 2024-08-20 RX ORDER — LIDOCAINE HYDROCHLORIDE 10 MG/ML
INJECTION, SOLUTION EPIDURAL; INFILTRATION; INTRACAUDAL; PERINEURAL AS NEEDED
Status: COMPLETED | OUTPATIENT
Start: 2024-08-20 | End: 2024-08-20

## 2024-08-20 RX ORDER — BETAMETHASONE SODIUM PHOSPHATE AND BETAMETHASONE ACETATE 3; 3 MG/ML; MG/ML
INJECTION, SUSPENSION INTRA-ARTICULAR; INTRALESIONAL; INTRAMUSCULAR; SOFT TISSUE AS NEEDED
Status: COMPLETED | OUTPATIENT
Start: 2024-08-20 | End: 2024-08-20

## 2024-08-20 RX ADMIN — BETAMETHASONE SODIUM PHOSPHATE AND BETAMETHASONE ACETATE 30 MG: 3; 3 INJECTION, SUSPENSION INTRA-ARTICULAR; INTRALESIONAL; INTRAMUSCULAR at 07:59

## 2024-08-20 RX ADMIN — IOHEXOL 5 ML: 240 INJECTION, SOLUTION INTRATHECAL; INTRAVASCULAR; INTRAVENOUS; ORAL at 08:00

## 2024-08-20 RX ADMIN — LIDOCAINE HYDROCHLORIDE 10 ML: 10 INJECTION, SOLUTION EPIDURAL; INFILTRATION; INTRACAUDAL; PERINEURAL at 07:59

## 2024-08-20 NOTE — DISCHARGE INSTR - AVS FIRST PAGE
YOUR 2 WEEK FOLLOW UP HAS BEEN SCHEDULED; IF YOU WISH TO CHANGE THE FOLLOW UP, PLEASE CALL THE [facility] AT [place]: [phone]    Epidural Steroid Injection   WHAT YOU NEED TO KNOW:   An epidural steroid injection (BAMBI) is a procedure to inject steroid medicine into the epidural space. The epidural space is between your spinal cord and vertebrae. Steroids reduce inflammation and fluid buildup in your spine that may be causing pain. You may be given pain medicine along with the steroids.        DISCHARGE INSTRUCTIONS:   Call your local emergency number (911 in the [place]) if:   You have a seizure.    You have trouble moving your legs.    Seek care immediately if:   Blood soaks through your bandage.    You have a fever or chills, severe back pain, and the procedure area is sensitive to the touch.    You cannot control when you urinate or have a bowel movement.    Call your doctor if:   You have weakness or numbness in your legs.    Your wound is red, swollen, or draining pus.    You have nausea or are vomiting.    Your face or neck is red and you feel warm.    You have more pain than you had before the procedure.    You have swelling in your hands or feet.    You have questions or concerns about your condition or care.    Care for your wound as directed:  You may remove the bandage before you go to bed the day of your procedure. You may take a shower, but do not take a bath for at least 24 hours.   Self-care:   Do not drive,  use machines, or do strenuous activity for 24 hours after your procedure or as directed.     Continue other treatments  as directed. Steroid injections alone will not control your pain. The injections are meant to be used with other treatments, such as physical therapy.    Follow up with your doctor as directed:  Write down your questions so you remember to ask them during your visits.     EPIDURAL STEROID INJECTION DISCHARGE INSTRUCTIONS      ACTIVITY  Do not drive or operate  machinery today.  No strenuous activity today - bending, lifting, etc.   You may resume normal activities starting tomorrow - start slowly and as tolerated.  You may shower today, but not tub baths or hot tubs.  You may have numbness for several hours from the local anesthetics. Please use caution and common sense, especially with weight-bearing activities.    CARE OF THE INJECTION SITE  If you have soreness or pain apply ice to the area today (20 minutes on and 20 minutes off).  Starting tomorrow, you   Notify the [facility] if you have any of the following: redness, drainage, swelling or fever above 100°F.      MEDICATIONS  Continue to take all routine medications.  Our office may have instructed you to hold some medications. You may restart medications, including blood thinners.

## 2024-08-20 NOTE — INTERVAL H&P NOTE
H&P reviewed. After examining the patient I find no changes in the patients condition since the H&P had been written.    Vitals:    08/20/24 0731   BP: 135/94   Pulse: 65   Resp: 18   Temp: 97.6 °F (36.4 °C)   SpO2: 100%

## 2024-09-05 ENCOUNTER — OFFICE VISIT (OUTPATIENT)
Dept: PAIN MEDICINE | Facility: CLINIC | Age: 54
End: 2024-09-05
Payer: COMMERCIAL

## 2024-09-05 VITALS
RESPIRATION RATE: 18 BRPM | HEART RATE: 62 BPM | OXYGEN SATURATION: 99 % | WEIGHT: 206 LBS | SYSTOLIC BLOOD PRESSURE: 124 MMHG | DIASTOLIC BLOOD PRESSURE: 88 MMHG | HEIGHT: 71 IN | TEMPERATURE: 97.3 F | BODY MASS INDEX: 28.84 KG/M2

## 2024-09-05 DIAGNOSIS — M47.816 LUMBAR SPONDYLOSIS: Primary | ICD-10-CM

## 2024-09-05 PROCEDURE — 99214 OFFICE O/P EST MOD 30 MIN: CPT | Performed by: ANESTHESIOLOGY

## 2024-09-06 NOTE — PATIENT INSTRUCTIONS
Patient Education     Core Strengthening Exercises on Back or on Hands and Knees   About this topic   Your core muscles are in your chest, back, buttock, and stomach area. They are your abdominal, back, and pelvis muscles. These muscles help keep your body stable when using your arms or legs. They also help with balance and posture. There are many exercises you can do to keep these muscles strong.  If you have back problems like a compression fracture or a ruptured disc, doing some of these exercises could make your problem worse. Some of these exercises may cause lower back pain.  General   Before starting with a program, ask your doctor if you are healthy enough to do these exercises. Your doctor may have you work with a , chiropractor, or physical therapist to make a safe exercise program to meet your needs.  Strengthening Exercises   Strengthening exercises keep your muscles firm and strong. Start by repeating each exercise 2 to 3 times. Work up to doing each exercise 10 times. Try to do the exercises 2 to 3 times each day. Hold each exercise for 3 to 5 seconds. Do all exercises slowly.  Hip lifts ? Lie on your back with your knees bent and feet flat on the floor. Tighten your stomach muscles and push your heels into the floor to lift your buttocks off the floor. Relax.  Pelvic tilts ? Lie on your back with your knees bent and feet flat on the floor. Tighten your stomach muscles and press your lower back down to the floor. Relax.  Straight leg raises lying down ? Lie on your back with one leg straight. Bend your other knee so the foot is flat on the bed. Keeping your leg straight, lift the leg up to the level of your other knee. Lower it back down. Repeat with the other leg.  Knee flex lying down ? Lie on your back with both knees bent and your feet flat on the floor. Tighten your belly muscles. Raise one leg up and back down as if you are marching in slow motion. Keep belly muscles tight while you move  your leg. Switch legs. To make this exercise harder, raise both arms straight up in the air. Tighten your belly muscles. When you raise one leg up, reach the opposite arm over your head. Switch, moving the opposite arm and leg until you have done 10 repetitions on each side.  Abdominal crunches ? Lie on your back with both knees bent. Keep your feet flat on the floor. Place your hands in one of these positions. Try starting with the first position since it is the easiest. As you get better, use the other positions to make it harder.  Crunches with arms at sides.  Crunches with arms across chest.  Crunches with arms behind head. Be careful not to interlock your fingers behind your neck or head while doing crunches. This may add tension to your neck and cause strain.  Look at the ceiling. Tighten your belly muscles and lift your shoulders and upper back off the floor. Breathe out while you are doing this. Lower your shoulders to the floor. Breathe in while you are doing this. Relax your belly muscles all the way before starting another crunch.  Arm and leg lifts on hands and knees ? Start on your hands and knees. With all of these exercises, keep your back as level as possible. If you are having trouble with this, you may want to put a small object on your back such as a book. If it falls off, you are not keeping your back level enough during the exercise.  Lift one arm up to shoulder level and hold. Lower it back down. Now, lift up the other arm and hold.  Lift one leg up and kick it straight out until it is in line with your back and hold. Lower it back down. Now, lift up the other leg and hold.  Lift one arm and the OPPOSITE leg up at the same time and hold. Lower them down. Now, repeat using the other arm and leg. This is a very hard exercise. It may take time to be able to do this.               What will the results be?   Stronger core  Better balance  More toned belly and back muscles  Easier to do daily  activities  Better sports performance  Less low back pain  Helpful tips   Stay active and work out to keep your muscles strong and flexible.  Keep a healthy weight to avoid putting too much stress on your spine. Eat a healthy diet to keep your muscles healthy.  Be sure you do not hold your breath when exercising. This can raise your blood pressure. If you tend to hold your breath, try counting out loud when exercising. If any exercise bothers you, stop right away.  Try walking or cycling at an easy pace for a few minutes to warm up your muscles. Do this again after exercising.  Exercise may be slightly uncomfortable, but you should not have sharp pains. If you do get sharp pains, stop what you are doing. If the sharp pains continue, call your doctor.  Last Reviewed Date   2021-03-18  Consumer Information Use and Disclaimer   This generalized information is a limited summary of diagnosis, treatment, and/or medication information. It is not meant to be comprehensive and should be used as a tool to help the user understand and/or assess potential diagnostic and treatment options. It does NOT include all information about conditions, treatments, medications, side effects, or risks that may apply to a specific patient. It is not intended to be medical advice or a substitute for the medical advice, diagnosis, or treatment of a health care provider based on the health care provider's examination and assessment of a patient’s specific and unique circumstances. Patients must speak with a health care provider for complete information about their health, medical questions, and treatment options, including any risks or benefits regarding use of medications. This information does not endorse any treatments or medications as safe, effective, or approved for treating a specific patient. [employer] and its affiliates disclaim any warranty or liability relating to this information or the use thereof. The use of this information  is governed by the Terms of Use, available at https://www.[employer].com/en/know/clinical-effectiveness-terms   Copyright   Copyright © 2024 [employer] and its affiliates and/or licensors. All rights reserved.

## 2024-09-06 NOTE — PROGRESS NOTES
Assessment  1. Lumbar spondylosis    Greater than 90% relief of pain with improved ability to participate with IADLs after bilateral L3, L4, L5 medial branch blocks #1 and #2 and subsequent radiofrequency ablation performed 3/1/22; repeat performed 8/22/23 with similar relief until pain returned last week. Previously described axial low back pain with aching, nagging, indolent, stabbing,, and characteristics.  Positive facet loading maneuvers concordant with prominent lumbar facet arthropathy seen throughout lumbar spine on MRI. No longer reports left sided L5 and Right L2 radicular pain where there is moderate left sided transforaminal stenosis at respective transforaminal regions of spine. Prior injection provided greater than 80% relief of pain for more than 7 months before returning and had responded well recently to repeat procedure. Risks, benefits and alternatives to repeat lumbar RFA discussed with patient.  Handouts provided questions answered to patient's satisfaction.  Previously reported the following symptomatology:     Right low axial back pain in the area of right sacroiliac joint.  Positive right-sided sacroiliac joint loading, positive Rony fingers test, positive Gaenslen's test.  Aching, nagging, indolent, stabbing, throbbing pain.  Additionally describes symptomatology of leg weakness and numbness that has been progressive.  The pain resembles neurogenic claudication.  No pertinent imaging available to review; however, pain with positive facet loading maneuvers bilaterally.  5/5 strength in all extremities with active range of motion movements, negative SLR bilaterally.  He has failed more than 12 weeks of conservative therapy including physical and chiropractic therapy.  Reasonable at this time to proceed with multimodal pain therapy plan while obtaining MRI lumbar spine noncontrast to guide interventional therapy.    Plan  -B/L L3, L4, L5 medial branch nerve RFA ; f/u 2 weeks post  procedure  -gabapentin and lyrica discontinued. counseled regarding sedative effects of taking this medication and provided up titration calendar.  Counseled not to take medication while driving or operating heavy machinery/using stairs  -DME TENS  -Meloxicam 7.5 mg b.i.d. prn pain previously prescribed. Patient takes infrequently, 1-2x a week; counseled to let us know if he takes it every day so gi ppx can be written. Patient educated regarding bleeding risk of taking this medication not taking any other nonsteroidal anti-inflammatory medications while taking this medication; counseled thoroughly regarding potential risk of Cardiovascular injury, Kidney injury, Gastrointestinal ulceration/bleeding. Patient voiced understanding  -has completed physical therapy for right-sided lumbar radiculopathy, lumbar facet arthropathy; Core exercises additionally provided for physician directed home PT that the patient plans to participate with for 1 hour, twice a week for the next 6 weeks.     There are risks associated with opioid medications, including dependence, addiction and tolerance. The patient understands and agrees to use these medications only as prescribed. Potential side effects of the medications include, but are not limited to, constipation, drowsiness, addiction, impaired judgment and risk of fatal overdose if not taken as prescribed. The patient was warned against driving while taking sedation medications.  Sharing medications is a felony. At this point in time, the patient is showing no signs of addiction, abuse, diversion or suicidal ideation.    [place] Prescription Drug Monitoring Program report was reviewed and was appropriate     Complete risks and benefits including bleeding, infection, tissue reaction, nerve injury and allergic reaction were discussed. The approach was demonstrated using models and literature was provided. Verbal and written consent was obtained.    My impressions and treatment  recommendations were discussed in detail with the patient who verbalized understanding and had no further questions.  Discharge instructions were provided. I personally saw and examined the patient and I agree with the above discussed plan of care.    No orders of the defined types were placed in this encounter.      History of Present Illness    Greater than 90% relief of pain with improved ability to participate with IADLs after bilateral L3, L4, L5 medial branch blocks #1 and #2 and subsequent radiofrequency ablation performed 3/1/22; repeat performed 8/22/23 with similar relief until pain returned last week. Previously described axial low back pain with aching, nagging, indolent, stabbing,, and characteristics.  No longer reports left sided L5 and Right L2 radicular pain where there is moderate left sided transforaminal stenosis at respective transforaminal regions of spine. Prior injection provided greater than 80% relief of pain for more than 7 months before returning and had responded well recently to repeat procedure. Previously reported the following symptomatology:     Felix Grajeda is a 53 y.o. male with pmhx of migraines, HTN presenting with right low axial back pain described primarily arthritic features. He describes 8/10 right low back pain that is worse in the mornings and worse at the end of the day.  The pain is characterized by achy, nagging, indolent, crampy, stabbing pain in his axial right low back in the region of the sacroiliac joint.  The patient describes that the pain is worse with standing for long periods of time on hard surfaces as well as with walking.  The patient is a very active individual and feels as though this pain compromises his  participation with independent activities of daily living. The pain can be debilitating at times and contribute to significant disability, compromising overall activity and independent activities of daily living. He has tried chiropractic and physical  therapy with limited relief of symptoms.  Medications the patient has tried in the past include ibuprofen with limited relief. He describes   radicular symptoms in the bilateral L4 and L5 dermatomes but otherwise has good strength.  He endorses weakness numbness or paresthesias. The patient denies any bowel or bladder dysfunction as well. The patient works as a  and states that he has significant debilitation with independent activities of daily living and overall function.    Additionally describes neck pain with arthritic features; pain with lateral rotation/extesion flexion. Chiropractic and PT did help with this pain in the past as have nsaids.    I have personally reviewed and/or updated the patient's past medical history, past surgical history, family history, social history, current medications, allergies, and vital signs today.     Review of Systems   Constitutional:  Positive for activity change.   HENT: Negative.     Eyes: Negative.    Respiratory: Negative.     Cardiovascular: Negative.    Gastrointestinal: Negative.    Endocrine: Negative.    Genitourinary: Negative.    Musculoskeletal:  Positive for arthralgias, back pain, gait problem and myalgias.   Skin: Negative.    Allergic/Immunologic: Negative.    Neurological:  Positive for weakness and numbness.   Hematological: Negative.    Psychiatric/Behavioral: Negative.     All other systems reviewed and are negative.      Patient Active Problem List   Diagnosis    Essential hypertension    Cellulitis of left lower extremity    Lumbar spondylosis    Lumbar radiculopathy       Past Medical History:   Diagnosis Date    Arthritis     Hypertension     Migraines        Past Surgical History:   Procedure Laterality Date    FL GUIDED NEEDLE PLAC BX/ASP/INJ  3/1/2022    FL GUIDED NEEDLE PLAC BX/ASP/INJ  11/1/2022    IR SPINE AND PAIN PROCEDURE  1/30/2024    IR SPINE AND PAIN PROCEDURE  8/20/2024    NERVE BLOCK Bilateral 12/28/2021     Procedure: BLOCK MEDIAL BRANCH Bilateral L3, L4, L5 #1;  Surgeon: Fermín Lares MD;  Location: [facility];  Service: Pain Management     NERVE BLOCK Bilateral 2022    Procedure: BLOCK MEDIAL BRANCH Bilateral L3, L4, L5 #2;  Surgeon: Fermín Lares MD;  Location: [facility];  Service: Pain Management     MT INJECT SI JOINT ARTHRGRPHY&/ANES/STEROID W/BETTYE Right 2021    Procedure: Interlaminar epidural steroid injection at L4-L5 ;  Surgeon: Fermín Lares MD;  Location: [facility];  Service: Pain Management     RHIZOTOMY Bilateral 3/1/2022    Procedure: RHIZOTOMY LUMBAR L3, L4, L5;  Surgeon: Fermín Lares MD;  Location: [facility];  Service: Pain Management     RHIZOTOMY Bilateral 2022    Procedure: RHIZOTOMY LUMBAR L3, L4, L5 medial branch nerves;  Surgeon: Fermín Lares MD;  Location: [facility];  Service: Pain Management     RHIZOTOMY Bilateral 2023    Procedure: RHIZOTOMY LUMBAR L3, L4, L5 medial branch nerves;  Surgeon: Fermín Lares MD;  Location: [facility];  Service: Pain Management     SHOULDER SURGERY Left        Family History   Problem Relation Age of Onset    Diabetes Mother     Migraines Mother     Hypertension Father        Social History     Occupational History    Not on file   Tobacco Use    Smoking status: Former     Current packs/day: 0.00     Average packs/day: 1 pack/day for 10.0 years (10.0 ttl pk-yrs)     Types: Cigarettes     Start date: 1990     Quit date: 2000     Years since quittin.3    Smokeless tobacco: Never   Vaping Use    Vaping status: Never Used   Substance and Sexual Activity    Alcohol use: Not Currently    Drug use: Yes     Types: Marijuana     Comment: Occasional marijuana    Sexual activity: Not on file       Current Outpatient Medications on File Prior to Visit   Medication Sig    hydrochlorothiazide (HYDRODIURIL) 50 mg tablet Take 50 mg by mouth daily    losartan (COZAAR) 50 mg tablet Take 50 mg by mouth daily    meloxicam (MOBIC) 7.5 mg  "tablet Take 1 tablet (7.5 mg total) by mouth 2 (two) times a day as needed for moderate pain    SUMAtriptan (IMITREX) 25 mg tablet As needed    pregabalin (LYRICA) 50 mg capsule Take 1 capsule (50 mg total) by mouth 3 (three) times a day (Patient not taking: Reported on 9/5/2024)     No current facility-administered medications on file prior to visit.       Allergies   Allergen Reactions    Penicillins Hives     Hives           Physical Exam    /88 (BP Location: Left arm, Patient Position: Sitting, Cuff Size: Adult)   Pulse 62   Temp (!) 97.3 °F (36.3 °C)   Resp 18   Ht 5' 11\" (1.803 m)   Wt 93.4 kg (206 lb)   SpO2 99%   BMI 28.73 kg/m²     Constitutional: normal, well developed, well nourished, alert, in no distress and non-toxic and no overt pain behavior. and overweight  Eyes: anicteric  HEENT: grossly intact  Neck: supple, symmetric, trachea midline and no masses   Pulmonary:even and unlabored  Cardiovascular:No edema or pitting edema present  Skin:Normal without rashes or lesions and well hydrated  Psychiatric:Mood and affect appropriate  Neurologic:Cranial Nerves II-XII grossly intact Sensation grossly intact; no clonus negative rodas's. Reflexes 2+ and brisk. SLR negative bilaterally.  Musculoskeletal:normal gait. 5/5 strength in all extremities with active range of motion movements bilaterally. Normal heel toe and tip toe walking.  pain with positive facet loading maneuvers bilaterally and with lateral spine rotation. No ttp over lumbar paraspinal muscles. Positive right-sided sacroiliac joint loading, positive Rony fingers test, positive Gaenslen's test.      Imaging    MRI LUMBAR SPINE WITHOUT CONTRAST     INDICATION: M54.16: Radiculopathy, lumbar region.   Low back pain with numbness into the left leg.     COMPARISON:  None.     TECHNIQUE:  Sagittal T1, sagittal T2, sagittal inversion recovery, axial T1 and axial T2, coronal T2.    IMAGE QUALITY:  Diagnostic     FINDINGS:     VERTEBRAL " BODIES:  There are 5 lumbar type vertebral bodies.  Levoscoliosis apex L1-2.  Slight retrolisthesis L1-2 also noted.  Modic type I degenerative marrow signal L1-2 eccentric to the right.     SACRUM:  Normal signal within the sacrum. No evidence of insufficiency or stress fracture.     DISTAL CORD AND CONUS:  Normal size and signal within the distal cord and conus.     PARASPINAL SOFT TISSUES:  Paraspinal soft tissues are unremarkable.     LOWER THORACIC DISC SPACES:  Normal disc height and signal.  No disc herniation, canal stenosis or foraminal narrowing.     LUMBAR DISC SPACES:     L1-L2:  Degenerative disc osteophyte complex with marginal osteophytes noted with a superimposed right paramedian protrusion type disc herniation.  There is moderate right lateral recess stenosis.  Correlate for right L2 radiculopathy.  Severe right and   mild left foraminal narrowing with mild central stenosis.     L2-L3:  Degenerative disc osteophyte complex with marginal osteophytes results in mild bilateral foraminal narrowing and mild central stenosis.  Mild/moderate left lateral recess stenosis.     L3-L4:  Mild annular bulge small marginal osteophytes result in moderate right and mild to moderate left foraminal narrowing with mild central stenosis.     L4-L5:  Moderate to severe left and mild right facet hypertrophy identified.  There is a disc osteophyte complex with marginal osteophytes resulting in moderate to severe left foraminal narrowing.  Correlate for left L4 radiculopathy.  Mild central   stenosis and moderate left lateral recess stenosis noted.     L5-S1:  Moderate facet hypertrophy identified.  There is annular bulging and small marginal osteophytes resulting in mild right foraminal narrowing and minimal central stenosis.     IMPRESSION:     Spondylotic degenerative changes are seen throughout the lumbar spine.  There is right lateral recess stenosis at L1-2 secondary to right paramedian protrusion type disc  herniation with marginal osteophyte contributing to severe right foraminal   narrowing.  Choroid for right L1 or right 2 radiculopathy.     Severe left foraminal narrowing noted at L4-5 secondary to spondylotic degenerative changes.  Correlate for left L4 radiculopathy.     Levoscoliosis noted with its apex at L1-2.  Degenerative changes are noted at remaining lumbar levels as described.

## 2024-09-06 NOTE — H&P (VIEW-ONLY)
Assessment  1. Lumbar spondylosis    Greater than 90% relief of pain with improved ability to participate with IADLs after bilateral L3, L4, L5 medial branch blocks #1 and #2 and subsequent radiofrequency ablation performed 3/1/22; repeat performed 8/22/23 with similar relief until pain returned last week. Previously described axial low back pain with aching, nagging, indolent, stabbing,, and characteristics.  Positive facet loading maneuvers concordant with prominent lumbar facet arthropathy seen throughout lumbar spine on MRI. No longer reports left sided L5 and Right L2 radicular pain where there is moderate left sided transforaminal stenosis at respective transforaminal regions of spine. Prior injection provided greater than 80% relief of pain for more than 7 months before returning and had responded well recently to repeat procedure. Risks, benefits and alternatives to repeat lumbar RFA discussed with patient.  Handouts provided questions answered to patient's satisfaction.  Previously reported the following symptomatology:     Right low axial back pain in the area of right sacroiliac joint.  Positive right-sided sacroiliac joint loading, positive Rony fingers test, positive Gaenslen's test.  Aching, nagging, indolent, stabbing, throbbing pain.  Additionally describes symptomatology of leg weakness and numbness that has been progressive.  The pain resembles neurogenic claudication.  No pertinent imaging available to review; however, pain with positive facet loading maneuvers bilaterally.  5/5 strength in all extremities with active range of motion movements, negative SLR bilaterally.  He has failed more than 12 weeks of conservative therapy including physical and chiropractic therapy.  Reasonable at this time to proceed with multimodal pain therapy plan while obtaining MRI lumbar spine noncontrast to guide interventional therapy.    Plan  -B/L L3, L4, L5 medial branch nerve RFA ; f/u 2 weeks post  procedure  -gabapentin and lyrica discontinued. counseled regarding sedative effects of taking this medication and provided up titration calendar.  Counseled not to take medication while driving or operating heavy machinery/using stairs  -DME TENS  -Meloxicam 7.5 mg b.i.d. prn pain previously prescribed. Patient takes infrequently, 1-2x a week; counseled to let us know if he takes it every day so gi ppx can be written. Patient educated regarding bleeding risk of taking this medication not taking any other nonsteroidal anti-inflammatory medications while taking this medication; counseled thoroughly regarding potential risk of Cardiovascular injury, Kidney injury, Gastrointestinal ulceration/bleeding. Patient voiced understanding  -has completed physical therapy for right-sided lumbar radiculopathy, lumbar facet arthropathy; Core exercises additionally provided for physician directed home PT that the patient plans to participate with for 1 hour, twice a week for the next 6 weeks.     There are risks associated with opioid medications, including dependence, addiction and tolerance. The patient understands and agrees to use these medications only as prescribed. Potential side effects of the medications include, but are not limited to, constipation, drowsiness, addiction, impaired judgment and risk of fatal overdose if not taken as prescribed. The patient was warned against driving while taking sedation medications.  Sharing medications is a felony. At this point in time, the patient is showing no signs of addiction, abuse, diversion or suicidal ideation.    [place] Prescription Drug Monitoring Program report was reviewed and was appropriate     Complete risks and benefits including bleeding, infection, tissue reaction, nerve injury and allergic reaction were discussed. The approach was demonstrated using models and literature was provided. Verbal and written consent was obtained.    My impressions and treatment  recommendations were discussed in detail with the patient who verbalized understanding and had no further questions.  Discharge instructions were provided. I personally saw and examined the patient and I agree with the above discussed plan of care.    No orders of the defined types were placed in this encounter.      History of Present Illness    Greater than 90% relief of pain with improved ability to participate with IADLs after bilateral L3, L4, L5 medial branch blocks #1 and #2 and subsequent radiofrequency ablation performed 3/1/22; repeat performed 8/22/23 with similar relief until pain returned last week. Previously described axial low back pain with aching, nagging, indolent, stabbing,, and characteristics.  No longer reports left sided L5 and Right L2 radicular pain where there is moderate left sided transforaminal stenosis at respective transforaminal regions of spine. Prior injection provided greater than 80% relief of pain for more than 7 months before returning and had responded well recently to repeat procedure. Previously reported the following symptomatology:     Felix Grajeda is a 53 y.o. male with pmhx of migraines, HTN presenting with right low axial back pain described primarily arthritic features. He describes 8/10 right low back pain that is worse in the mornings and worse at the end of the day.  The pain is characterized by achy, nagging, indolent, crampy, stabbing pain in his axial right low back in the region of the sacroiliac joint.  The patient describes that the pain is worse with standing for long periods of time on hard surfaces as well as with walking.  The patient is a very active individual and feels as though this pain compromises his  participation with independent activities of daily living. The pain can be debilitating at times and contribute to significant disability, compromising overall activity and independent activities of daily living. He has tried chiropractic and physical  therapy with limited relief of symptoms.  Medications the patient has tried in the past include ibuprofen with limited relief. He describes   radicular symptoms in the bilateral L4 and L5 dermatomes but otherwise has good strength.  He endorses weakness numbness or paresthesias. The patient denies any bowel or bladder dysfunction as well. The patient works as a  and states that he has significant debilitation with independent activities of daily living and overall function.    Additionally describes neck pain with arthritic features; pain with lateral rotation/extesion flexion. Chiropractic and PT did help with this pain in the past as have nsaids.    I have personally reviewed and/or updated the patient's past medical history, past surgical history, family history, social history, current medications, allergies, and vital signs today.     Review of Systems   Constitutional:  Positive for activity change.   HENT: Negative.     Eyes: Negative.    Respiratory: Negative.     Cardiovascular: Negative.    Gastrointestinal: Negative.    Endocrine: Negative.    Genitourinary: Negative.    Musculoskeletal:  Positive for arthralgias, back pain, gait problem and myalgias.   Skin: Negative.    Allergic/Immunologic: Negative.    Neurological:  Positive for weakness and numbness.   Hematological: Negative.    Psychiatric/Behavioral: Negative.     All other systems reviewed and are negative.      Patient Active Problem List   Diagnosis    Essential hypertension    Cellulitis of left lower extremity    Lumbar spondylosis    Lumbar radiculopathy       Past Medical History:   Diagnosis Date    Arthritis     Hypertension     Migraines        Past Surgical History:   Procedure Laterality Date    FL GUIDED NEEDLE PLAC BX/ASP/INJ  3/1/2022    FL GUIDED NEEDLE PLAC BX/ASP/INJ  11/1/2022    IR SPINE AND PAIN PROCEDURE  1/30/2024    IR SPINE AND PAIN PROCEDURE  8/20/2024    NERVE BLOCK Bilateral 12/28/2021     Procedure: BLOCK MEDIAL BRANCH Bilateral L3, L4, L5 #1;  Surgeon: Fermín Lares MD;  Location: [facility];  Service: Pain Management     NERVE BLOCK Bilateral 2022    Procedure: BLOCK MEDIAL BRANCH Bilateral L3, L4, L5 #2;  Surgeon: Fermín Lares MD;  Location: [facility];  Service: Pain Management     CT INJECT SI JOINT ARTHRGRPHY&/ANES/STEROID W/BETTYE Right 2021    Procedure: Interlaminar epidural steroid injection at L4-L5 ;  Surgeon: Fermín Lares MD;  Location: [facility];  Service: Pain Management     RHIZOTOMY Bilateral 3/1/2022    Procedure: RHIZOTOMY LUMBAR L3, L4, L5;  Surgeon: Fermín Lares MD;  Location: [facility];  Service: Pain Management     RHIZOTOMY Bilateral 2022    Procedure: RHIZOTOMY LUMBAR L3, L4, L5 medial branch nerves;  Surgeon: Fermín Lares MD;  Location: [facility];  Service: Pain Management     RHIZOTOMY Bilateral 2023    Procedure: RHIZOTOMY LUMBAR L3, L4, L5 medial branch nerves;  Surgeon: Fermín Lares MD;  Location: [facility];  Service: Pain Management     SHOULDER SURGERY Left        Family History   Problem Relation Age of Onset    Diabetes Mother     Migraines Mother     Hypertension Father        Social History     Occupational History    Not on file   Tobacco Use    Smoking status: Former     Current packs/day: 0.00     Average packs/day: 1 pack/day for 10.0 years (10.0 ttl pk-yrs)     Types: Cigarettes     Start date: 1990     Quit date: 2000     Years since quittin.3    Smokeless tobacco: Never   Vaping Use    Vaping status: Never Used   Substance and Sexual Activity    Alcohol use: Not Currently    Drug use: Yes     Types: Marijuana     Comment: Occasional marijuana    Sexual activity: Not on file       Current Outpatient Medications on File Prior to Visit   Medication Sig    hydrochlorothiazide (HYDRODIURIL) 50 mg tablet Take 50 mg by mouth daily    losartan (COZAAR) 50 mg tablet Take 50 mg by mouth daily    meloxicam (MOBIC) 7.5 mg  "tablet Take 1 tablet (7.5 mg total) by mouth 2 (two) times a day as needed for moderate pain    SUMAtriptan (IMITREX) 25 mg tablet As needed    pregabalin (LYRICA) 50 mg capsule Take 1 capsule (50 mg total) by mouth 3 (three) times a day (Patient not taking: Reported on 9/5/2024)     No current facility-administered medications on file prior to visit.       Allergies   Allergen Reactions    Penicillins Hives     Hives           Physical Exam    /88 (BP Location: Left arm, Patient Position: Sitting, Cuff Size: Adult)   Pulse 62   Temp (!) 97.3 °F (36.3 °C)   Resp 18   Ht 5' 11\" (1.803 m)   Wt 93.4 kg (206 lb)   SpO2 99%   BMI 28.73 kg/m²     Constitutional: normal, well developed, well nourished, alert, in no distress and non-toxic and no overt pain behavior. and overweight  Eyes: anicteric  HEENT: grossly intact  Neck: supple, symmetric, trachea midline and no masses   Pulmonary:even and unlabored  Cardiovascular:No edema or pitting edema present  Skin:Normal without rashes or lesions and well hydrated  Psychiatric:Mood and affect appropriate  Neurologic:Cranial Nerves II-XII grossly intact Sensation grossly intact; no clonus negative rodas's. Reflexes 2+ and brisk. SLR negative bilaterally.  Musculoskeletal:normal gait. 5/5 strength in all extremities with active range of motion movements bilaterally. Normal heel toe and tip toe walking.  pain with positive facet loading maneuvers bilaterally and with lateral spine rotation. No ttp over lumbar paraspinal muscles. Positive right-sided sacroiliac joint loading, positive Rony fingers test, positive Gaenslen's test.      Imaging    MRI LUMBAR SPINE WITHOUT CONTRAST     INDICATION: M54.16: Radiculopathy, lumbar region.   Low back pain with numbness into the left leg.     COMPARISON:  None.     TECHNIQUE:  Sagittal T1, sagittal T2, sagittal inversion recovery, axial T1 and axial T2, coronal T2.    IMAGE QUALITY:  Diagnostic     FINDINGS:     VERTEBRAL " BODIES:  There are 5 lumbar type vertebral bodies.  Levoscoliosis apex L1-2.  Slight retrolisthesis L1-2 also noted.  Modic type I degenerative marrow signal L1-2 eccentric to the right.     SACRUM:  Normal signal within the sacrum. No evidence of insufficiency or stress fracture.     DISTAL CORD AND CONUS:  Normal size and signal within the distal cord and conus.     PARASPINAL SOFT TISSUES:  Paraspinal soft tissues are unremarkable.     LOWER THORACIC DISC SPACES:  Normal disc height and signal.  No disc herniation, canal stenosis or foraminal narrowing.     LUMBAR DISC SPACES:     L1-L2:  Degenerative disc osteophyte complex with marginal osteophytes noted with a superimposed right paramedian protrusion type disc herniation.  There is moderate right lateral recess stenosis.  Correlate for right L2 radiculopathy.  Severe right and   mild left foraminal narrowing with mild central stenosis.     L2-L3:  Degenerative disc osteophyte complex with marginal osteophytes results in mild bilateral foraminal narrowing and mild central stenosis.  Mild/moderate left lateral recess stenosis.     L3-L4:  Mild annular bulge small marginal osteophytes result in moderate right and mild to moderate left foraminal narrowing with mild central stenosis.     L4-L5:  Moderate to severe left and mild right facet hypertrophy identified.  There is a disc osteophyte complex with marginal osteophytes resulting in moderate to severe left foraminal narrowing.  Correlate for left L4 radiculopathy.  Mild central   stenosis and moderate left lateral recess stenosis noted.     L5-S1:  Moderate facet hypertrophy identified.  There is annular bulging and small marginal osteophytes resulting in mild right foraminal narrowing and minimal central stenosis.     IMPRESSION:     Spondylotic degenerative changes are seen throughout the lumbar spine.  There is right lateral recess stenosis at L1-2 secondary to right paramedian protrusion type disc  herniation with marginal osteophyte contributing to severe right foraminal   narrowing.  Choroid for right L1 or right 2 radiculopathy.     Severe left foraminal narrowing noted at L4-5 secondary to spondylotic degenerative changes.  Correlate for left L4 radiculopathy.     Levoscoliosis noted with its apex at L1-2.  Degenerative changes are noted at remaining lumbar levels as described.

## 2024-09-10 ENCOUNTER — TELEPHONE (OUTPATIENT)
Dept: PAIN MEDICINE | Facility: CLINIC | Age: 54
End: 2024-09-10

## 2024-09-10 ENCOUNTER — PREP FOR PROCEDURE (OUTPATIENT)
Dept: PAIN MEDICINE | Facility: CLINIC | Age: 54
End: 2024-09-10

## 2024-09-10 DIAGNOSIS — M47.816 LUMBAR SPONDYLOSIS: Primary | ICD-10-CM

## 2024-09-10 NOTE — TELEPHONE ENCOUNTER
Spoke to patient and scheduled his REPEAT RFA FOR 9/24/24 with local anesthesia. Patient aware of instructions. No food/drink one hour prior. Wear comfortable clothing. A  is required. Denies blood thinners. Continue all other prescribed medications on day of procedure. Call if prescribed an antibiotic or become sick. Refrain from vaccinations two weeks prior and two weeks after. Patient aware [facility] nurse will call day prior with instructions and report time. Call with questions.

## 2024-09-12 NOTE — TELEPHONE ENCOUNTER
Caller: Marixa[employer]    Doctor: VS    Reason for call: She is calling in reference to pts auth for his procedure. Xfer call    Call back#: [phone]

## 2024-09-24 ENCOUNTER — TELEPHONE (OUTPATIENT)
Dept: PAIN MEDICINE | Facility: CLINIC | Age: 54
End: 2024-09-24

## 2024-09-24 ENCOUNTER — HOSPITAL ENCOUNTER (OUTPATIENT)
Dept: INTERVENTIONAL RADIOLOGY/VASCULAR | Facility: HOSPITAL | Age: 54
Discharge: HOME | End: 2024-09-24
Attending: ANESTHESIOLOGY
Payer: COMMERCIAL

## 2024-09-24 VITALS
BODY MASS INDEX: 28.83 KG/M2 | HEART RATE: 66 BPM | WEIGHT: 205.91 LBS | DIASTOLIC BLOOD PRESSURE: 96 MMHG | SYSTOLIC BLOOD PRESSURE: 143 MMHG | OXYGEN SATURATION: 97 % | HEIGHT: 71 IN | RESPIRATION RATE: 20 BRPM | TEMPERATURE: 98.3 F

## 2024-09-24 DIAGNOSIS — M47.816 LUMBAR SPONDYLOSIS: ICD-10-CM

## 2024-09-24 PROCEDURE — 64635 DESTROY LUMB/SAC FACET JNT: CPT | Performed by: ANESTHESIOLOGY

## 2024-09-24 PROCEDURE — 64636 DESTROY L/S FACET JNT ADDL: CPT | Performed by: ANESTHESIOLOGY

## 2024-09-24 RX ORDER — BUPIVACAINE HCL/PF 2.5 MG/ML
VIAL (ML) INJECTION AS NEEDED
Status: COMPLETED | OUTPATIENT
Start: 2024-09-24 | End: 2024-09-24

## 2024-09-24 RX ORDER — LIDOCAINE HYDROCHLORIDE 20 MG/ML
INJECTION, SOLUTION EPIDURAL; INFILTRATION; INTRACAUDAL; PERINEURAL AS NEEDED
Status: COMPLETED | OUTPATIENT
Start: 2024-09-24 | End: 2024-09-24

## 2024-09-24 RX ORDER — METHYLPREDNISOLONE ACETATE 80 MG/ML
INJECTION, SUSPENSION INTRA-ARTICULAR; INTRALESIONAL; INTRAMUSCULAR; PARENTERAL; SOFT TISSUE AS NEEDED
Status: COMPLETED | OUTPATIENT
Start: 2024-09-24 | End: 2024-09-24

## 2024-09-24 RX ORDER — LIDOCAINE HYDROCHLORIDE 10 MG/ML
INJECTION, SOLUTION EPIDURAL; INFILTRATION; INTRACAUDAL; PERINEURAL AS NEEDED
Status: COMPLETED | OUTPATIENT
Start: 2024-09-24 | End: 2024-09-24

## 2024-09-24 RX ADMIN — LIDOCAINE HYDROCHLORIDE 10 ML: 10 INJECTION, SOLUTION EPIDURAL; INFILTRATION; INTRACAUDAL; PERINEURAL at 09:18

## 2024-09-24 RX ADMIN — LIDOCAINE HYDROCHLORIDE 5 ML: 20 INJECTION, SOLUTION EPIDURAL; INFILTRATION; INTRACAUDAL; PERINEURAL at 09:23

## 2024-09-24 RX ADMIN — BUPIVACAINE HYDROCHLORIDE 5 ML: 2.5 INJECTION, SOLUTION EPIDURAL; INFILTRATION; INTRACAUDAL; PERINEURAL at 09:28

## 2024-09-24 RX ADMIN — METHYLPREDNISOLONE ACETATE 80 MG: 80 INJECTION, SUSPENSION INTRA-ARTICULAR; INTRALESIONAL; INTRAMUSCULAR; SOFT TISSUE at 09:28

## 2024-09-24 NOTE — DISCHARGE INSTR - AVS FIRST PAGE
YOUR 2 WEEK FOLLOW UP HAS BEEN SCHEDULED; IF YOU WISH TO CHANGE THE FOLLOW UP, PLEASE CALL THE [facility] [place]: [phone]    Lumbar Radiofrequency Ablation   WHAT YOU NEED TO KNOW:   Lumbar radiofrequency ablation (RFA) is a procedure used to treat facet joint pain in your lower back. Facet joints are found at the back of each vertebra. A needle electrode is used to send electrical currents to the nerves in your facet joint. The electrical currents create heat that damages the nerve so it cannot send pain signals.   DISCHARGE INSTRUCTIONS:   Seek care immediately if:   You cannot move your leg.    You cannot control your urine or bowel movements.    You have severe pain in your lower back.    Call your doctor if:   You have leg weakness.     You develop new symptoms.     You have questions or concerns about your condition or care.    Medicines:   Pain medicine  may be given. Ask how to take this medicine safely.    Take your medicine as directed.  Contact your healthcare provider if you think your medicine is not helping or if you have side effects. Tell your provider if you are allergic to any medicine. Keep a list of the medicines, vitamins, and herbs you take. Include the amounts, and when and why you take them. Bring the list or the pill bottles to follow-up visits. Carry your medicine list with you in case of an emergency.    Activity:  Do not drive a car or operate machinery within 24 hours after your procedure. Ask your healthcare provider about any other activities you should avoid.  Follow up with your doctor as directed:  Write down your questions so you remember to ask them during your visits.    RADIO FREQUENCY MEDIAL BRANCH NEUROTOMY DISCHARGE INSTRUCTIONS      ACTIVITY  Do not drive or operate machinery today.  No strenuous activity today - bending, lifting, etc.  You may show today, but do not sit in a tub of water.  Resume normal activities tomorrow as tolerated.    CARE OF  THE INJECTION SITE  If you have soreness or pain, apply ice to the area today (20 minute on/20 minutes off).  Starting tomorrow, you may use warm, moist heat or ice if needed.  Notify the [facility] if you have any of the following: redness, drainage, swelling or fever above 100°F.    SPECIAL INSTRUCTIONS  Our office will call you tomorrow for a progress report and make an appointment for a follow-up visit in 4 weeks.  If you feel a sunburn-like sensation in the area of your procedure, call our office.    MEDICATIONS  Continue to take all routine medications.  Our office may have instructed you to hold some medications.  You may resume _______________________________________________.    If you have any problems specifically related to your procedure, please call our office at [phone]. Problems not related to your procedure should be directed at your primary care physician.

## 2024-09-24 NOTE — INTERVAL H&P NOTE
H&P reviewed. After examining the patient I find no changes in the patients condition since the H&P had been written.    Vitals:    09/24/24 0856   BP: 135/91   Pulse: 100   Resp: 18   Temp: 97.5 °F (36.4 °C)   SpO2: 100%

## 2024-10-21 ENCOUNTER — OFFICE VISIT (OUTPATIENT)
Dept: PAIN MEDICINE | Facility: CLINIC | Age: 54
End: 2024-10-21
Payer: COMMERCIAL

## 2024-10-21 VITALS
SYSTOLIC BLOOD PRESSURE: 122 MMHG | WEIGHT: 205 LBS | BODY MASS INDEX: 28.7 KG/M2 | DIASTOLIC BLOOD PRESSURE: 90 MMHG | OXYGEN SATURATION: 98 % | HEART RATE: 68 BPM | RESPIRATION RATE: 18 BRPM | HEIGHT: 71 IN | TEMPERATURE: 98 F

## 2024-10-21 DIAGNOSIS — M54.16 LUMBAR RADICULOPATHY: ICD-10-CM

## 2024-10-21 PROCEDURE — 99213 OFFICE O/P EST LOW 20 MIN: CPT | Performed by: ANESTHESIOLOGY

## 2024-10-21 RX ORDER — MELOXICAM 7.5 MG/1
7.5 TABLET ORAL 2 TIMES DAILY PRN
Qty: 180 TABLET | Refills: 3 | Status: SHIPPED | OUTPATIENT
Start: 2024-10-21

## 2024-10-21 NOTE — PROGRESS NOTES
Assessment  1. Lumbar radiculopathy  -     meloxicam (MOBIC) 7.5 mg tablet; Take 1 tablet (7.5 mg total) by mouth 2 (two) times a day as needed for moderate pain    Greater than 90% relief of pain with improved ability to participate with IADLs after bilateral L3, L4, L5 medial branch blocks #1 and #2 and subsequent radiofrequency ablation performed 3/1/22; repeat performed 8/22/23, 9/24/24  with similar relief thus far. Previously described axial low back pain with aching, nagging, indolent, stabbing, and characteristics.  Positive facet loading maneuvers concordant with prominent lumbar facet arthropathy seen throughout lumbar spine on MRI. No longer reports left sided L5 and Right L2 radicular pain where there is moderate left sided transforaminal stenosis at respective transforaminal regions of spine. Prior injection provided greater than 80% relief of pain for more than 7 months before returning and had responded well recently to repeat procedure performed 8/20/24. Meloxicam prn providing significant improvement with IADLs without side effects. Previously reported the following symptomatology:     Right low axial back pain in the area of right sacroiliac joint.  Positive right-sided sacroiliac joint loading, positive Rony fingers test, positive Gaenslen's test.  Aching, nagging, indolent, stabbing, throbbing pain.  Additionally describes symptomatology of leg weakness and numbness that has been progressive.  The pain resembles neurogenic claudication.  No pertinent imaging available to review; however, pain with positive facet loading maneuvers bilaterally.  5/5 strength in all extremities with active range of motion movements, negative SLR bilaterally.  He has failed more than 12 weeks of conservative therapy including physical and chiropractic therapy.  Reasonable at this time to proceed with multimodal pain therapy plan while obtaining MRI lumbar spine noncontrast to guide interventional  therapy.    Plan  -f/u prn  -gabapentin and lyrica discontinued. counseled regarding sedative effects of taking this medication and provided up titration calendar.  Counseled not to take medication while driving or operating heavy machinery/using stairs  -DME TENS  -Meloxicam 7.5 mg b.i.d. prn pain previously prescribed. Patient takes infrequently, 1-2x a week; counseled to let us know if he takes it every day so gi ppx can be written. Patient educated regarding bleeding risk of taking this medication not taking any other nonsteroidal anti-inflammatory medications while taking this medication; counseled thoroughly regarding potential risk of Cardiovascular injury, Kidney injury, Gastrointestinal ulceration/bleeding. Patient voiced understanding  -has completed physical therapy for right-sided lumbar radiculopathy, lumbar facet arthropathy; Core exercises additionally provided for physician directed home PT that the patient plans to participate with for 1 hour, twice a week for the next 6 weeks.     There are risks associated with opioid medications, including dependence, addiction and tolerance. The patient understands and agrees to use these medications only as prescribed. Potential side effects of the medications include, but are not limited to, constipation, drowsiness, addiction, impaired judgment and risk of fatal overdose if not taken as prescribed. The patient was warned against driving while taking sedation medications.  Sharing medications is a felony. At this point in time, the patient is showing no signs of addiction, abuse, diversion or suicidal ideation.    [place] Prescription Drug Monitoring Program report was reviewed and was appropriate     Complete risks and benefits including bleeding, infection, tissue reaction, nerve injury and allergic reaction were discussed. The approach was demonstrated using models and literature was provided. Verbal and written consent was obtained.    My impressions  and treatment recommendations were discussed in detail with the patient who verbalized understanding and had no further questions.  Discharge instructions were provided. I personally saw and examined the patient and I agree with the above discussed plan of care.    New Medications Ordered This Visit   Medications    omeprazole (PriLOSEC) 20 mg delayed release capsule     Sig: Take 20 mg by mouth daily    meloxicam (MOBIC) 7.5 mg tablet     Sig: Take 1 tablet (7.5 mg total) by mouth 2 (two) times a day as needed for moderate pain     Dispense:  180 tablet     Refill:  3       History of Present Illness    Felix Grajeda is a 53 y.o. male with pmhx of migraines, HTN presenting with right low axial back pain described primarily arthritic features. He describes 8/10 right low back pain that is worse in the mornings and worse at the end of the day.  The pain is characterized by achy, nagging, indolent, crampy, stabbing pain in his axial right low back in the region of the sacroiliac joint.  The patient describes that the pain is worse with standing for long periods of time on hard surfaces as well as with walking.  The patient is a very active individual and feels as though this pain compromises his  participation with independent activities of daily living. The pain can be debilitating at times and contribute to significant disability, compromising overall activity and independent activities of daily living. He has tried chiropractic and physical therapy with limited relief of symptoms.  Medications the patient has tried in the past include ibuprofen with limited relief. He describes   radicular symptoms in the bilateral L4 and L5 dermatomes but otherwise has good strength.  He endorses weakness numbness or paresthesias. The patient denies any bowel or bladder dysfunction as well. The patient works as a  and states that he has significant debilitation with independent activities of daily living and  overall function.    Additionally describes neck pain with arthritic features; pain with lateral rotation/extesion flexion. Chiropractic and PT did help with this pain in the past as have nsaids.    I have personally reviewed and/or updated the patient's past medical history, past surgical history, family history, social history, current medications, allergies, and vital signs today.     Review of Systems   Constitutional:  Positive for activity change.   HENT: Negative.     Eyes: Negative.    Respiratory: Negative.     Cardiovascular: Negative.    Gastrointestinal: Negative.    Endocrine: Negative.    Genitourinary: Negative.    Musculoskeletal:  Positive for arthralgias, back pain, gait problem and myalgias.   Skin: Negative.    Allergic/Immunologic: Negative.    Neurological:  Positive for weakness and numbness.   Hematological: Negative.    Psychiatric/Behavioral: Negative.     All other systems reviewed and are negative.      Patient Active Problem List   Diagnosis    Essential hypertension    Cellulitis of left lower extremity    Lumbar spondylosis    Lumbar radiculopathy       Past Medical History:   Diagnosis Date    Arthritis     Hypertension     Migraines        Past Surgical History:   Procedure Laterality Date    FL GUIDED NEEDLE PLAC BX/ASP/INJ  3/1/2022    FL GUIDED NEEDLE PLAC BX/ASP/INJ  11/1/2022    IR SPINE AND PAIN PROCEDURE  1/30/2024    IR SPINE AND PAIN PROCEDURE  8/20/2024    IR SPINE AND PAIN PROCEDURE  9/24/2024    NERVE BLOCK Bilateral 12/28/2021    Procedure: BLOCK MEDIAL BRANCH Bilateral L3, L4, L5 #1;  Surgeon: Fermín Lares MD;  Location: [facility];  Service: Pain Management     NERVE BLOCK Bilateral 1/20/2022    Procedure: BLOCK MEDIAL BRANCH Bilateral L3, L4, L5 #2;  Surgeon: Fermín Lares MD;  Location: [facility];  Service: Pain Management     MO INJECT SI JOINT ARTHRGRPHY&/ANES/STEROID W/BETTYE Right 12/2/2021    Procedure: Interlaminar epidural steroid injection at L4-L5 ;   Surgeon: Fermín Lares MD;  Location: [facility];  Service: Pain Management     RHIZOTOMY Bilateral 3/1/2022    Procedure: RHIZOTOMY LUMBAR L3, L4, L5;  Surgeon: Fermín Lares MD;  Location: [facility];  Service: Pain Management     RHIZOTOMY Bilateral 2022    Procedure: RHIZOTOMY LUMBAR L3, L4, L5 medial branch nerves;  Surgeon: Fermín Lares MD;  Location: [facility];  Service: Pain Management     RHIZOTOMY Bilateral 2023    Procedure: RHIZOTOMY LUMBAR L3, L4, L5 medial branch nerves;  Surgeon: Fermín Lares MD;  Location: [facility];  Service: Pain Management     SHOULDER SURGERY Left        Family History   Problem Relation Age of Onset    Diabetes Mother     Migraines Mother     Hypertension Father        Social History     Occupational History    Not on file   Tobacco Use    Smoking status: Former     Current packs/day: 0.00     Average packs/day: 1 pack/day for 10.0 years (10.0 ttl pk-yrs)     Types: Cigarettes     Start date: 1990     Quit date: 2000     Years since quittin.4    Smokeless tobacco: Never   Vaping Use    Vaping status: Never Used   Substance and Sexual Activity    Alcohol use: Not Currently    Drug use: Yes     Types: Marijuana     Comment: Occasional marijuana    Sexual activity: Not on file       Current Outpatient Medications on File Prior to Visit   Medication Sig    hydrochlorothiazide (HYDRODIURIL) 50 mg tablet Take 50 mg by mouth daily    losartan (COZAAR) 50 mg tablet Take 50 mg by mouth daily    omeprazole (PriLOSEC) 20 mg delayed release capsule Take 20 mg by mouth daily    SUMAtriptan (IMITREX) 25 mg tablet As needed    [DISCONTINUED] meloxicam (MOBIC) 7.5 mg tablet Take 1 tablet (7.5 mg total) by mouth 2 (two) times a day as needed for moderate pain     No current facility-administered medications on file prior to visit.       Allergies   Allergen Reactions    Penicillins Hives     Hives           Physical Exam    /90 (BP Location: Left arm,  "Patient Position: Sitting, Cuff Size: Adult)   Pulse 68   Temp 98 °F (36.7 °C)   Resp 18   Ht 5' 11\" (1.803 m)   Wt 93 kg (205 lb)   SpO2 98%   BMI 28.59 kg/m²     Constitutional: normal, well developed, well nourished, alert, in no distress and non-toxic and no overt pain behavior. and overweight  Eyes: anicteric  HEENT: grossly intact  Neck: supple, symmetric, trachea midline and no masses   Pulmonary:even and unlabored  Cardiovascular:No edema or pitting edema present  Skin:Normal without rashes or lesions and well hydrated  Psychiatric:Mood and affect appropriate  Neurologic:Cranial Nerves II-XII grossly intact Sensation grossly intact; no clonus negative rodas's. Reflexes 2+ and brisk. SLR negative bilaterally.  Musculoskeletal:normal gait. 5/5 strength in all extremities with active range of motion movements bilaterally. Normal heel toe and tip toe walking.  pain with positive facet loading maneuvers bilaterally and with lateral spine rotation. No ttp over lumbar paraspinal muscles. Positive right-sided sacroiliac joint loading, positive Rony fingers test, positive Gaenslen's test.      Imaging    MRI LUMBAR SPINE WITHOUT CONTRAST     INDICATION: M54.16: Radiculopathy, lumbar region.   Low back pain with numbness into the left leg.     COMPARISON:  None.     TECHNIQUE:  Sagittal T1, sagittal T2, sagittal inversion recovery, axial T1 and axial T2, coronal T2.    IMAGE QUALITY:  Diagnostic     FINDINGS:     VERTEBRAL BODIES:  There are 5 lumbar type vertebral bodies.  Levoscoliosis apex L1-2.  Slight retrolisthesis L1-2 also noted.  Modic type I degenerative marrow signal L1-2 eccentric to the right.     SACRUM:  Normal signal within the sacrum. No evidence of insufficiency or stress fracture.     DISTAL CORD AND CONUS:  Normal size and signal within the distal cord and conus.     PARASPINAL SOFT TISSUES:  Paraspinal soft tissues are unremarkable.     LOWER THORACIC DISC SPACES:  Normal disc height " and signal.  No disc herniation, canal stenosis or foraminal narrowing.     LUMBAR DISC SPACES:     L1-L2:  Degenerative disc osteophyte complex with marginal osteophytes noted with a superimposed right paramedian protrusion type disc herniation.  There is moderate right lateral recess stenosis.  Correlate for right L2 radiculopathy.  Severe right and   mild left foraminal narrowing with mild central stenosis.     L2-L3:  Degenerative disc osteophyte complex with marginal osteophytes results in mild bilateral foraminal narrowing and mild central stenosis.  Mild/moderate left lateral recess stenosis.     L3-L4:  Mild annular bulge small marginal osteophytes result in moderate right and mild to moderate left foraminal narrowing with mild central stenosis.     L4-L5:  Moderate to severe left and mild right facet hypertrophy identified.  There is a disc osteophyte complex with marginal osteophytes resulting in moderate to severe left foraminal narrowing.  Correlate for left L4 radiculopathy.  Mild central   stenosis and moderate left lateral recess stenosis noted.     L5-S1:  Moderate facet hypertrophy identified.  There is annular bulging and small marginal osteophytes resulting in mild right foraminal narrowing and minimal central stenosis.     IMPRESSION:     Spondylotic degenerative changes are seen throughout the lumbar spine.  There is right lateral recess stenosis at L1-2 secondary to right paramedian protrusion type disc herniation with marginal osteophyte contributing to severe right foraminal   narrowing.  Choroid for right L1 or right 2 radiculopathy.     Severe left foraminal narrowing noted at L4-5 secondary to spondylotic degenerative changes.  Correlate for left L4 radiculopathy.     Levoscoliosis noted with its apex at L1-2.  Degenerative changes are noted at remaining lumbar levels as described.

## 2024-10-21 NOTE — PATIENT INSTRUCTIONS
Patient Education     Core Strengthening Exercises on Back or on Hands and Knees   About this topic   Your core muscles are in your chest, back, buttock, and stomach area. They are your abdominal, back, and pelvis muscles. These muscles help keep your body stable when using your arms or legs. They also help with balance and posture. There are many exercises you can do to keep these muscles strong.  If you have back problems like a compression fracture or a ruptured disc, doing some of these exercises could make your problem worse. Some of these exercises may cause lower back pain.  General   Before starting with a program, ask your doctor if you are healthy enough to do these exercises. Your doctor may have you work with a , chiropractor, or physical therapist to make a safe exercise program to meet your needs.  Strengthening Exercises   Strengthening exercises keep your muscles firm and strong. Start by repeating each exercise 2 to 3 times. Work up to doing each exercise 10 times. Try to do the exercises 2 to 3 times each day. Hold each exercise for 3 to 5 seconds. Do all exercises slowly.  Hip lifts ? Lie on your back with your knees bent and feet flat on the floor. Tighten your stomach muscles and push your heels into the floor to lift your buttocks off the floor. Relax.  Pelvic tilts ? Lie on your back with your knees bent and feet flat on the floor. Tighten your stomach muscles and press your lower back down to the floor. Relax.  Straight leg raises lying down ? Lie on your back with one leg straight. Bend your other knee so the foot is flat on the bed. Keeping your leg straight, lift the leg up to the level of your other knee. Lower it back down. Repeat with the other leg.  Knee flex lying down ? Lie on your back with both knees bent and your feet flat on the floor. Tighten your belly muscles. Raise one leg up and back down as if you are marching in slow motion. Keep belly muscles tight while you move  your leg. Switch legs. To make this exercise harder, raise both arms straight up in the air. Tighten your belly muscles. When you raise one leg up, reach the opposite arm over your head. Switch, moving the opposite arm and leg until you have done 10 repetitions on each side.  Abdominal crunches ? Lie on your back with both knees bent. Keep your feet flat on the floor. Place your hands in one of these positions. Try starting with the first position since it is the easiest. As you get better, use the other positions to make it harder.  Crunches with arms at sides.  Crunches with arms across chest.  Crunches with arms behind head. Be careful not to interlock your fingers behind your neck or head while doing crunches. This may add tension to your neck and cause strain.  Look at the ceiling. Tighten your belly muscles and lift your shoulders and upper back off the floor. Breathe out while you are doing this. Lower your shoulders to the floor. Breathe in while you are doing this. Relax your belly muscles all the way before starting another crunch.  Arm and leg lifts on hands and knees ? Start on your hands and knees. With all of these exercises, keep your back as level as possible. If you are having trouble with this, you may want to put a small object on your back such as a book. If it falls off, you are not keeping your back level enough during the exercise.  Lift one arm up to shoulder level and hold. Lower it back down. Now, lift up the other arm and hold.  Lift one leg up and kick it straight out until it is in line with your back and hold. Lower it back down. Now, lift up the other leg and hold.  Lift one arm and the OPPOSITE leg up at the same time and hold. Lower them down. Now, repeat using the other arm and leg. This is a very hard exercise. It may take time to be able to do this.               What will the results be?   Stronger core  Better balance  More toned belly and back muscles  Easier to do daily  activities  Better sports performance  Less low back pain  Helpful tips   Stay active and work out to keep your muscles strong and flexible.  Keep a healthy weight to avoid putting too much stress on your spine. Eat a healthy diet to keep your muscles healthy.  Be sure you do not hold your breath when exercising. This can raise your blood pressure. If you tend to hold your breath, try counting out loud when exercising. If any exercise bothers you, stop right away.  Try walking or cycling at an easy pace for a few minutes to warm up your muscles. Do this again after exercising.  Exercise may be slightly uncomfortable, but you should not have sharp pains. If you do get sharp pains, stop what you are doing. If the sharp pains continue, call your doctor.  Last Reviewed Date   2021-03-18  Consumer Information Use and Disclaimer   This generalized information is a limited summary of diagnosis, treatment, and/or medication information. It is not meant to be comprehensive and should be used as a tool to help the user understand and/or assess potential diagnostic and treatment options. It does NOT include all information about conditions, treatments, medications, side effects, or risks that may apply to a specific patient. It is not intended to be medical advice or a substitute for the medical advice, diagnosis, or treatment of a health care provider based on the health care provider's examination and assessment of a patient’s specific and unique circumstances. Patients must speak with a health care provider for complete information about their health, medical questions, and treatment options, including any risks or benefits regarding use of medications. This information does not endorse any treatments or medications as safe, effective, or approved for treating a specific patient. [employer] and its affiliates disclaim any warranty or liability relating to this information or the use thereof. The use of this information  is governed by the Terms of Use, available at https://www.woltersTripboduwer.com/en/know/clinical-effectiveness-terms   Copyright   Copyright © 2024 [employer] and its affiliates and/or licensors. All rights reserved.

## 2025-03-18 ENCOUNTER — TELEPHONE (OUTPATIENT)
Age: 55
End: 2025-03-18

## 2025-03-18 NOTE — TELEPHONE ENCOUNTER
Caller: Patient    Doctor: Dr. BAUTISTA    Reason for call:     Transferred to [facility]    Call back#: n/a

## 2025-03-18 NOTE — TELEPHONE ENCOUNTER
Caller: nancy [place]    Doctor: Dr. Lares    Reason for call: pt calling to schedule repeat injection and RFA    Call back#: [phone]

## 2025-03-24 ENCOUNTER — PREP FOR PROCEDURE (OUTPATIENT)
Dept: PAIN MEDICINE | Facility: CLINIC | Age: 55
End: 2025-03-24

## 2025-03-24 DIAGNOSIS — M54.16 LUMBAR RADICULOPATHY: Primary | ICD-10-CM

## 2025-04-08 ENCOUNTER — HOSPITAL ENCOUNTER (OUTPATIENT)
Dept: INTERVENTIONAL RADIOLOGY/VASCULAR | Facility: HOSPITAL | Age: 55
Discharge: HOME | End: 2025-04-08
Attending: ANESTHESIOLOGY
Payer: COMMERCIAL

## 2025-04-08 VITALS
BODY MASS INDEX: 28.7 KG/M2 | TEMPERATURE: 98.1 F | DIASTOLIC BLOOD PRESSURE: 83 MMHG | HEIGHT: 71 IN | WEIGHT: 205 LBS | SYSTOLIC BLOOD PRESSURE: 126 MMHG | OXYGEN SATURATION: 100 % | HEART RATE: 56 BPM | RESPIRATION RATE: 18 BRPM

## 2025-04-08 DIAGNOSIS — M54.16 LUMBAR RADICULOPATHY: ICD-10-CM

## 2025-04-08 PROCEDURE — 64484 NJX AA&/STRD TFRM EPI L/S EA: CPT | Performed by: ANESTHESIOLOGY

## 2025-04-08 PROCEDURE — 64483 NJX AA&/STRD TFRM EPI L/S 1: CPT | Performed by: ANESTHESIOLOGY

## 2025-04-08 RX ORDER — BETAMETHASONE SODIUM PHOSPHATE AND BETAMETHASONE ACETATE 3; 3 MG/ML; MG/ML
INJECTION, SUSPENSION INTRA-ARTICULAR; INTRALESIONAL; INTRAMUSCULAR; SOFT TISSUE AS NEEDED
Status: COMPLETED | OUTPATIENT
Start: 2025-04-08 | End: 2025-04-08

## 2025-04-08 RX ORDER — LIDOCAINE HYDROCHLORIDE 10 MG/ML
INJECTION, SOLUTION EPIDURAL; INFILTRATION; INTRACAUDAL; PERINEURAL AS NEEDED
Status: COMPLETED | OUTPATIENT
Start: 2025-04-08 | End: 2025-04-08

## 2025-04-08 RX ADMIN — BETAMETHASONE SODIUM PHOSPHATE AND BETAMETHASONE ACETATE 30 MG: 3; 3 INJECTION, SUSPENSION INTRA-ARTICULAR; INTRALESIONAL; INTRAMUSCULAR at 08:23

## 2025-04-08 RX ADMIN — LIDOCAINE HYDROCHLORIDE 15 ML: 10 INJECTION, SOLUTION EPIDURAL; INFILTRATION; INTRACAUDAL; PERINEURAL at 08:19

## 2025-04-08 RX ADMIN — IOHEXOL 2 ML: 240 INJECTION, SOLUTION INTRATHECAL; INTRAVASCULAR; INTRAVENOUS; ORAL at 08:23

## 2025-04-08 NOTE — DISCHARGE INSTR - AVS FIRST PAGE
YOUR 2 WEEK FOLLOW UP HAS BEEN SCHEDULED; IF YOU WISH TO CHANGE THE FOLLOW UP, PLEASE CALL THE [facility] AT [place]: [phone]    EPIDURAL STEROID INJECTION DISCHARGE INSTRUCTIONS  WHAT YOU NEED TO KNOW:   An epidural steroid injection (BAMBI) is a procedure to inject steroid medicine into the epidural space. The epidural space is between your spinal cord and vertebrae. Steroids reduce inflammation and fluid buildup in your spine that may be causing pain. You may be given pain medicine along with the steroids.        DISCHARGE INSTRUCTIONS:   Call your local emergency number (911 in the [place]) if:   You have a seizure.    You have trouble moving your legs.    Seek care immediately if:   Blood soaks through your bandage.    You have a fever or chills, severe back pain, and the procedure area is sensitive to the touch.    You cannot control when you urinate or have a bowel movement.    Call your doctor if:   You have weakness or numbness in your legs.    Your wound is red, swollen, or draining pus.    You have nausea or are vomiting.    Your face or neck is red and you feel warm.    You have more pain than you had before the procedure.    You have swelling in your hands or feet.    You have questions or concerns about your condition or care.    Care for your wound as directed:  You may remove the bandage before you go to bed the day of your procedure. You may take a shower, but do not take a bath for at least 24 hours.   Self-care:   Do not drive,  use machines, or do strenuous activity for 24 hours after your procedure or as directed.     Continue other treatments  as directed. Steroid injections alone will not control your pain. The injections are meant to be used with other treatments, such as physical therapy.    Follow up with your doctor as directed:  Write down your questions so you remember to ask them during your visits.           ACTIVITY  Do not drive or operate machinery today.  No strenuous  activity today - bending, lifting, etc.   You may resume normal activities starting tomorrow - start slowly and as tolerated.  You may shower today, but not tub baths or hot tubs.  You may have numbness for several hours from the local anesthetics. Please use caution and common sense, especially with weight-bearing activities.    CARE OF THE INJECTION SITE  If you have soreness or pain apply ice to the area today (20 minutes on and 20 minutes off).  Starting tomorrow, you may resume normal activities  Notify the [facility] if you have any of the following: redness, drainage, swelling or fever above 100°F.      MEDICATIONS  Continue to take all routine medications.  Our office may have instructed you to hold some medications. You may restart medications, including blood thinners.

## 2025-04-08 NOTE — H&P
Assessment  1. Lumbar radiculopathy  -     IR spine and pain procedure; Standing  -     IR spine and pain procedure    Greater than 90% relief of pain with improved ability to participate with IADLs after bilateral L3, L4, L5 medial branch blocks #1 and #2 and subsequent radiofrequency ablation performed 3/1/22; repeat performed 8/22/23, 9/24/24  with similar relief thus far. Previously described axial low back pain with aching, nagging, indolent, stabbing, and characteristics.  Positive facet loading maneuvers concordant with prominent lumbar facet arthropathy seen throughout lumbar spine on MRI. Reports left sided L5 and Right L2 radicular pain where there is moderate left sided transforaminal stenosis at respective transforaminal regions of spine. Prior injection provided greater than 80% relief of pain for more than 7 months before returning when last performed 8/20/24. Meloxicam prn providing significant improvement with IADLs without side effects. Previously reported the following symptomatology:     Right low axial back pain in the area of right sacroiliac joint.  Positive right-sided sacroiliac joint loading, positive Rony fingers test, positive Gaenslen's test.  Aching, nagging, indolent, stabbing, throbbing pain.  Additionally describes symptomatology of leg weakness and numbness that has been progressive.  The pain resembles neurogenic claudication.  No pertinent imaging available to review; however, pain with positive facet loading maneuvers bilaterally.  5/5 strength in all extremities with active range of motion movements, negative SLR bilaterally.  He has failed more than 12 weeks of conservative therapy including physical and chiropractic therapy.  Reasonable at this time to proceed with multimodal pain therapy plan while obtaining MRI lumbar spine noncontrast to guide interventional therapy.    Plan  -Right L2 and left L5 TFESI; f/u 2 weeks post proc  -gabapentin and lyrica discontinued.  counseled regarding sedative effects of taking this medication and provided up titration calendar.  Counseled not to take medication while driving or operating heavy machinery/using stairs  -DME TENS  -Meloxicam 7.5 mg b.i.d. prn pain previously prescribed. Patient takes infrequently, 1-2x a week; counseled to let us know if he takes it every day so gi ppx can be written. Patient educated regarding bleeding risk of taking this medication not taking any other nonsteroidal anti-inflammatory medications while taking this medication; counseled thoroughly regarding potential risk of Cardiovascular injury, Kidney injury, Gastrointestinal ulceration/bleeding. Patient voiced understanding  -has completed physical therapy for right-sided lumbar radiculopathy, lumbar facet arthropathy; Core exercises additionally provided for physician directed home PT that the patient plans to participate with for 1 hour, twice a week for the next 6 weeks.     There are risks associated with opioid medications, including dependence, addiction and tolerance. The patient understands and agrees to use these medications only as prescribed. Potential side effects of the medications include, but are not limited to, constipation, drowsiness, addiction, impaired judgment and risk of fatal overdose if not taken as prescribed. The patient was warned against driving while taking sedation medications.  Sharing medications is a felony. At this point in time, the patient is showing no signs of addiction, abuse, diversion or suicidal ideation.    [place] Prescription Drug Monitoring Program report was reviewed and was appropriate     Complete risks and benefits including bleeding, infection, tissue reaction, nerve injury and allergic reaction were discussed. The approach was demonstrated using models and literature was provided. Verbal and written consent was obtained.    My impressions and treatment recommendations were discussed in detail with the  patient who verbalized understanding and had no further questions.  Discharge instructions were provided. I personally saw and examined the patient and I agree with the above discussed plan of care.    No orders of the defined types were placed in this encounter.      History of Present Illness    Felix Grajeda is a 54 y.o. male with pmhx of migraines, HTN presenting with right low axial back pain described primarily arthritic features. He describes 8/10 right low back pain that is worse in the mornings and worse at the end of the day.  The pain is characterized by achy, nagging, indolent, crampy, stabbing pain in his axial right low back in the region of the sacroiliac joint.  The patient describes that the pain is worse with standing for long periods of time on hard surfaces as well as with walking.  The patient is a very active individual and feels as though this pain compromises his  participation with independent activities of daily living. The pain can be debilitating at times and contribute to significant disability, compromising overall activity and independent activities of daily living. He has tried chiropractic and physical therapy with limited relief of symptoms.  Medications the patient has tried in the past include ibuprofen with limited relief. He describes   radicular symptoms in the bilateral L4 and L5 dermatomes but otherwise has good strength.  He endorses weakness numbness or paresthesias. The patient denies any bowel or bladder dysfunction as well. The patient works as a  and states that he has significant debilitation with independent activities of daily living and overall function.    Additionally describes neck pain with arthritic features; pain with lateral rotation/extesion flexion. Chiropractic and PT did help with this pain in the past as have nsaids.    I have personally reviewed and/or updated the patient's past medical history, past surgical history, family  history, social history, current medications, allergies, and vital signs today.     Review of Systems   Constitutional:  Positive for activity change.   HENT: Negative.     Eyes: Negative.    Respiratory: Negative.     Cardiovascular: Negative.    Gastrointestinal: Negative.    Endocrine: Negative.    Genitourinary: Negative.    Musculoskeletal:  Positive for arthralgias, back pain, gait problem and myalgias.   Skin: Negative.    Allergic/Immunologic: Negative.    Neurological:  Positive for weakness and numbness.   Hematological: Negative.    Psychiatric/Behavioral: Negative.     All other systems reviewed and are negative.      Patient Active Problem List   Diagnosis    Essential hypertension    Cellulitis of left lower extremity    Lumbar spondylosis    Lumbar radiculopathy       Past Medical History:   Diagnosis Date    Arthritis     Hypertension     Migraines        Past Surgical History:   Procedure Laterality Date    FL GUIDED NEEDLE PLAC BX/ASP/INJ  3/1/2022    FL GUIDED NEEDLE PLAC BX/ASP/INJ  11/1/2022    IR SPINE AND PAIN PROCEDURE  1/30/2024    IR SPINE AND PAIN PROCEDURE  8/20/2024    IR SPINE AND PAIN PROCEDURE  9/24/2024    NERVE BLOCK Bilateral 12/28/2021    Procedure: BLOCK MEDIAL BRANCH Bilateral L3, L4, L5 #1;  Surgeon: Fermín Lares MD;  Location: [facility];  Service: Pain Management     NERVE BLOCK Bilateral 1/20/2022    Procedure: BLOCK MEDIAL BRANCH Bilateral L3, L4, L5 #2;  Surgeon: Fermín Lares MD;  Location: [facility];  Service: Pain Management     KS INJECT SI JOINT ARTHRGRPHY&/ANES/STEROID W/BETTYE Right 12/2/2021    Procedure: Interlaminar epidural steroid injection at L4-L5 ;  Surgeon: Fermín Lares MD;  Location: [facility];  Service: Pain Management     RHIZOTOMY Bilateral 3/1/2022    Procedure: RHIZOTOMY LUMBAR L3, L4, L5;  Surgeon: Fermín Lares MD;  Location: [facility];  Service: Pain Management     RHIZOTOMY Bilateral 11/1/2022    Procedure: RHIZOTOMY LUMBAR L3, L4, L5 medial  "branch nerves;  Surgeon: Fermín Lares MD;  Location: [facility];  Service: Pain Management     RHIZOTOMY Bilateral 2023    Procedure: RHIZOTOMY LUMBAR L3, L4, L5 medial branch nerves;  Surgeon: Fermín Lares MD;  Location: [facility];  Service: Pain Management     SHOULDER SURGERY Left        Family History   Problem Relation Age of Onset    Diabetes Mother     Migraines Mother     Hypertension Father        Social History     Occupational History    Not on file   Tobacco Use    Smoking status: Former     Current packs/day: 0.00     Average packs/day: 1 pack/day for 10.0 years (10.0 ttl pk-yrs)     Types: Cigarettes     Start date: 1990     Quit date: 2000     Years since quittin.9    Smokeless tobacco: Never   Vaping Use    Vaping status: Never Used   Substance and Sexual Activity    Alcohol use: Not Currently    Drug use: Yes     Types: Marijuana     Comment: Occasional marijuana    Sexual activity: Not on file       Current Outpatient Medications on File Prior to Encounter   Medication Sig    hydrochlorothiazide (HYDRODIURIL) 50 mg tablet Take 50 mg by mouth daily    losartan (COZAAR) 50 mg tablet Take 50 mg by mouth daily    meloxicam (MOBIC) 7.5 mg tablet Take 1 tablet (7.5 mg total) by mouth 2 (two) times a day as needed for moderate pain    omeprazole (PriLOSEC) 20 mg delayed release capsule Take 20 mg by mouth daily    SUMAtriptan (IMITREX) 25 mg tablet As needed     No current facility-administered medications on file prior to encounter.       Allergies   Allergen Reactions    Penicillins Hives     Hives           Physical Exam    /92   Pulse 56   Resp 18   Ht 5' 11\" (1.803 m)   Wt 93 kg (205 lb)   SpO2 100%   BMI 28.59 kg/m²     Constitutional: normal, well developed, well nourished, alert, in no distress and non-toxic and no overt pain behavior. and overweight  Eyes: anicteric  HEENT: grossly intact  Neck: supple, symmetric, trachea midline and no masses   Pulmonary:even " and unlabored  Cardiovascular:No edema or pitting edema present  Skin:Normal without rashes or lesions and well hydrated  Psychiatric:Mood and affect appropriate  Neurologic:Cranial Nerves II-XII grossly intact Sensation grossly intact; no clonus negative rodas's. Reflexes 2+ and brisk. SLR negative bilaterally.  Musculoskeletal:normal gait. 5/5 strength in all extremities with active range of motion movements bilaterally. Normal heel toe and tip toe walking.  pain with positive facet loading maneuvers bilaterally and with lateral spine rotation. No ttp over lumbar paraspinal muscles. Positive right-sided sacroiliac joint loading, positive Rony fingers test, positive Gaenslen's test.      Imaging    MRI LUMBAR SPINE WITHOUT CONTRAST     INDICATION: M54.16: Radiculopathy, lumbar region.   Low back pain with numbness into the left leg.     COMPARISON:  None.     TECHNIQUE:  Sagittal T1, sagittal T2, sagittal inversion recovery, axial T1 and axial T2, coronal T2.    IMAGE QUALITY:  Diagnostic     FINDINGS:     VERTEBRAL BODIES:  There are 5 lumbar type vertebral bodies.  Levoscoliosis apex L1-2.  Slight retrolisthesis L1-2 also noted.  Modic type I degenerative marrow signal L1-2 eccentric to the right.     SACRUM:  Normal signal within the sacrum. No evidence of insufficiency or stress fracture.     DISTAL CORD AND CONUS:  Normal size and signal within the distal cord and conus.     PARASPINAL SOFT TISSUES:  Paraspinal soft tissues are unremarkable.     LOWER THORACIC DISC SPACES:  Normal disc height and signal.  No disc herniation, canal stenosis or foraminal narrowing.     LUMBAR DISC SPACES:     L1-L2:  Degenerative disc osteophyte complex with marginal osteophytes noted with a superimposed right paramedian protrusion type disc herniation.  There is moderate right lateral recess stenosis.  Correlate for right L2 radiculopathy.  Severe right and   mild left foraminal narrowing with mild central stenosis.      L2-L3:  Degenerative disc osteophyte complex with marginal osteophytes results in mild bilateral foraminal narrowing and mild central stenosis.  Mild/moderate left lateral recess stenosis.     L3-L4:  Mild annular bulge small marginal osteophytes result in moderate right and mild to moderate left foraminal narrowing with mild central stenosis.     L4-L5:  Moderate to severe left and mild right facet hypertrophy identified.  There is a disc osteophyte complex with marginal osteophytes resulting in moderate to severe left foraminal narrowing.  Correlate for left L4 radiculopathy.  Mild central   stenosis and moderate left lateral recess stenosis noted.     L5-S1:  Moderate facet hypertrophy identified.  There is annular bulging and small marginal osteophytes resulting in mild right foraminal narrowing and minimal central stenosis.     IMPRESSION:     Spondylotic degenerative changes are seen throughout the lumbar spine.  There is right lateral recess stenosis at L1-2 secondary to right paramedian protrusion type disc herniation with marginal osteophyte contributing to severe right foraminal   narrowing.  Choroid for right L1 or right 2 radiculopathy.     Severe left foraminal narrowing noted at L4-5 secondary to spondylotic degenerative changes.  Correlate for left L4 radiculopathy.     Levoscoliosis noted with its apex at L1-2.  Degenerative changes are noted at remaining lumbar levels as described.

## 2025-04-28 ENCOUNTER — OFFICE VISIT (OUTPATIENT)
Dept: PAIN MEDICINE | Facility: CLINIC | Age: 55
End: 2025-04-28
Payer: COMMERCIAL

## 2025-04-28 VITALS — BODY MASS INDEX: 28.56 KG/M2 | HEIGHT: 71 IN | WEIGHT: 204 LBS

## 2025-04-28 DIAGNOSIS — M47.26 OTHER SPONDYLOSIS WITH RADICULOPATHY, LUMBAR REGION: Primary | ICD-10-CM

## 2025-04-28 PROCEDURE — 99213 OFFICE O/P EST LOW 20 MIN: CPT | Performed by: ANESTHESIOLOGY

## 2025-04-28 NOTE — PATIENT INSTRUCTIONS
"Patient Education     Back exercises   The Basics   Written by the doctors and editors at [facility]   Why do I need to do back exercises? -- Back exercises can help ease back pain and might help prevent future back pain. Long term, it is important to strengthen the muscles in your lower back, buttocks, and belly. These are your \"core muscles.\" Stretching exercises are also important to keep your muscles flexible.  Below are some stretching and strengthening exercises that might help you. Other forms of movement can help ease or prevent back pain, too. For example, some people like to walk, do aerobic exercise, or do yoga or ashley chi. The most important thing is to move your body. Your doctor, nurse, or physical therapist can help you find different types of activity that work for you.  What stretching exercises should I do? -- Below are some examples of stretching exercises. Warm up your muscles before stretching to help prevent injury. To warm up, you can walk, jog, or cycle for a few minutes.  Start by repeating each of these stretches 2 to 3 times. Try to hold each stretch for 5 to 10 seconds, and try to do the stretches 2 to 3 times each day. Breathe slowly and deeply as you do the exercises. Never bounce when doing stretches.   Cat-cow stretch (figure 1) - Start on all fours on the floor, with your hands under your shoulders, knees under your hips, and your back flat. First, tuck your chin and tighten your stomach muscles as you round your back (like a \"cat\"). Hold the stretch for about 10 seconds. Rest for a few seconds as you flatten your back. Next, lift your chin and let your belly and lower back sink toward the floor (like a \"cow\"). Hold the stretch for about 10 seconds.   Single knee-to-chest stretches (figure 2) ? While lying on your back, bend your knees with your feet flat on the floor. Pull 1 knee toward your chest until you feel a stretch in your lower back and buttock area. Lower, and repeat with the " other knee. If you have knee problems, pull your knee up by grabbing the back of your thigh instead of the front of your knee. You can also do this exercise by grabbing both knees at the same time.   Lower trunk rotations (figure 3) ? While lying on your back, bend your knees with your feet flat on the floor. Keep your knees and ankles together, and then drop them to 1 side. Keep both of your shoulders touching the floor until you feel a stretch in the muscles at the side of the back. Repeat on the other side.   Lower back stretches seated (figure 4) ? Sit in a chair with your feet spread about shoulder width apart. Then, lean forward until you feel a stretch in your lower back.  What strengthening exercises should I do? -- Below are some examples of strengthening exercises.  Start by doing each exercise 2 to 3 times. Work up to doing each exercise 10 times. Hold each exercise for 3 to 5 seconds, and try to do the exercises 2 to 3 times each day. Do all exercises slowly.   Shoulder blade squeezes (figure 5) ? Pinch your shoulder blades together on your upper back, and hold 3 to 5 seconds. You can also do these 1 side at a time. Sit with good posture, and make sure that your shoulders do not rise up when you do this exercise. Relax.   Pelvic tilts (figure 6) ? Lie on your back with your knees bent and feet flat on the floor. Tighten your stomach muscles, and press your lower back down to the floor. Relax. You should be able to breathe comfortably during this exercise.   Hip lifts (figure 7) ? Lie on your back with your knees bent and feet flat on the floor. Tighten your stomach muscles, keep your back flat, and lift your buttocks off of the floor. Relax. You should feel this in your buttocks, not in your lower back.  What else should I know?    Exercise, including stretching, might be slightly uncomfortable. But you should not have sharp or severe pain. If you do get severe pain, stop what you are doing. If severe  "pain continues, call your doctor or nurse.   Do not hold your breath when exercising. If you tend to hold your breath, try counting out loud when exercising. If any exercise bothers you, stop right away.   Always warm up before exercising. Warm muscles stretch much easier than cool muscles. Stretching cool muscles can lead to injury.   Doing exercises before a meal can be a good way to get into a routine.  All topics are updated as new evidence becomes available and our peer review process is complete.  This topic retrieved from [employer] on: Apr 03, 2024.  Topic 254947 Version 2.0  Release: 32.2.4 - C32.92  © 2024 Up[employer]Date, Inc. and/or its affiliates. All rights reserved.  figure 1: Cat-cow stretch     Start on all fours on the floor, with hands under your shoulders, knees under your hips, and your back flat. First, tuck your chin and tighten your stomach muscles as you round your back (like a \"cat\"). Hold the stretch for about 10 seconds. Rest for a few seconds as you flatten your back. Next, lift your chin and let your belly and lower back sink toward the floor (like a \"cow\"). Hold the stretch for about 10 seconds.  Graphic [id number] Version 1.0  figure 2: Single knee-to-chest stretch     Lie on your back, bend your knees, and have your feet flat on the floor. Pull 1 knee toward your chest until you feel a stretch in your lower back and buttock area. Repeat with the other knee. If you have knee problems, pull your knee up by grabbing the back of your thigh instead of the front of your knee. You can also do this exercise by grabbing both knees at the same time.  Graphic [id number] Version 1.0  figure 3: Lower trunk rotation     While lying on your back, bend your knees and have your feet flat on the floor. Keep your legs together and then drop them to 1 side. Keep both of your shoulders touching the floor until you feel a stretch in the muscles at the side of the back. Repeat on the other side.  Graphic [id number] Version " 1.0  figure 4: Lower back stretch     Sit in a chair with your feet spread about shoulder width apart. Then, lean forward until you feel a stretch in your lower back.  Graphic [id number] Version 1.0  figure 5: Shoulder blade squeezes     Pinch your shoulder blades together on your upper back and hold for 3 to 5 seconds. Make sure that you are sitting with good posture and that your shoulders do not raise up when you do this exercise. Relax.  Graphic [id number] Version 1.0  figure 6: Pelvic tilts     Lie on your back with your knees bent and feet flat on the floor. Tighten your stomach muscles and press your lower back down to the floor. Relax.  Graphic [id number] Version 1.0  figure 7: Hip lifts     Lie on your back with your knees bent and feet flat on the floor. Tighten your stomach muscles and lift your buttocks off of the floor. Relax.  Graphic [id number] Version 1.0  Consumer Information Use and Disclaimer   Disclaimer: This generalized information is a limited summary of diagnosis, treatment, and/or medication information. It is not meant to be comprehensive and should be used as a tool to help the user understand and/or assess potential diagnostic and treatment options. It does NOT include all information about conditions, treatments, medications, side effects, or risks that may apply to a specific patient. It is not intended to be medical advice or a substitute for the medical advice, diagnosis, or treatment of a health care provider based on the health care provider's examination and assessment of a patient's specific and unique circumstances. Patients must speak with a health care provider for complete information about their health, medical questions, and treatment options, including any risks or benefits regarding use of medications. This information does not endorse any treatments or medications as safe, effective, or approved for treating a specific patient. Up[employer]Date, Inc. and its affiliates disclaim any warranty or  liability relating to this information or the use thereof.The use of this information is governed by the Terms of Use, available at https://www.wolterskluwer.com/en/know/clinical-effectiveness-terms. 2024© UpToDate, Inc. and its affiliates and/or licensors. All rights reserved.  Copyright   © 2024 [employer] and/or its affiliates. All rights reserved.

## 2025-04-28 NOTE — PROGRESS NOTES
Assessment  1. Other spondylosis with radiculopathy, lumbar region    Greater than 90% relief of pain with improved ability to participate with IADLs after bilateral L3, L4, L5 medial branch blocks #1 and #2 and subsequent radiofrequency ablation performed 3/1/22; repeat performed 8/22/23, 9/24/24  with similar relief thus far. Previously described axial low back pain with aching, nagging, indolent, stabbing, and characteristics.  Positive facet loading maneuvers concordant with prominent lumbar facet arthropathy seen throughout lumbar spine on MRI. No longer reports left sided L5 and Right L2 radicular pain where there is moderate left sided transforaminal stenosis at respective transforaminal regions of spine. Recent injection provided greater than 80% relief of pain with improvement in IADLs; prior did so for more than 7 months before returning. Meloxicam prn providing significant improvement with IADLs without side effects. Previously reported the following symptomatology:     Right low axial back pain in the area of right sacroiliac joint.  Positive right-sided sacroiliac joint loading, positive Rony fingers test, positive Gaenslen's test.  Aching, nagging, indolent, stabbing, throbbing pain.  Additionally describes symptomatology of leg weakness and numbness that has been progressive.  The pain resembles neurogenic claudication.  No pertinent imaging available to review; however, pain with positive facet loading maneuvers bilaterally.  5/5 strength in all extremities with active range of motion movements, negative SLR bilaterally.  He has failed more than 12 weeks of conservative therapy including physical and chiropractic therapy.  Reasonable at this time to proceed with multimodal pain therapy plan while obtaining MRI lumbar spine noncontrast to guide interventional therapy.    Plan  -f/u prn  -gabapentin and lyrica discontinued. counseled regarding sedative effects of taking this medication and  provided up titration calendar.  Counseled not to take medication while driving or operating heavy machinery/using stairs  -DME TENS  -Meloxicam 7.5 mg b.i.d. prn pain previously prescribed. Patient takes infrequently, 1-2x a week; counseled to let us know if he takes it every day so gi ppx can be written. Patient educated regarding bleeding risk of taking this medication not taking any other nonsteroidal anti-inflammatory medications while taking this medication; counseled thoroughly regarding potential risk of Cardiovascular injury, Kidney injury, Gastrointestinal ulceration/bleeding. Patient voiced understanding  -has completed physical therapy for right-sided lumbar radiculopathy, lumbar facet arthropathy; Core exercises additionally provided for physician directed home PT that the patient plans to participate with for 1 hour, twice a week for the next 6 weeks.     There are risks associated with opioid medications, including dependence, addiction and tolerance. The patient understands and agrees to use these medications only as prescribed. Potential side effects of the medications include, but are not limited to, constipation, drowsiness, addiction, impaired judgment and risk of fatal overdose if not taken as prescribed. The patient was warned against driving while taking sedation medications.  Sharing medications is a felony. At this point in time, the patient is showing no signs of addiction, abuse, diversion or suicidal ideation.    [place] Prescription Drug Monitoring Program report was reviewed and was appropriate     Complete risks and benefits including bleeding, infection, tissue reaction, nerve injury and allergic reaction were discussed. The approach was demonstrated using models and literature was provided. Verbal and written consent was obtained.    My impressions and treatment recommendations were discussed in detail with the patient who verbalized understanding and had no further questions.   Discharge instructions were provided. I personally saw and examined the patient and I agree with the above discussed plan of care.    No orders of the defined types were placed in this encounter.      History of Present Illness    Felix Grajeda is a 54 y.o. male with pmhx of migraines, HTN presenting with right low axial back pain described primarily arthritic features. He describes 8/10 right low back pain that is worse in the mornings and worse at the end of the day.  The pain is characterized by achy, nagging, indolent, crampy, stabbing pain in his axial right low back in the region of the sacroiliac joint.  The patient describes that the pain is worse with standing for long periods of time on hard surfaces as well as with walking.  The patient is a very active individual and feels as though this pain compromises his  participation with independent activities of daily living. The pain can be debilitating at times and contribute to significant disability, compromising overall activity and independent activities of daily living. He has tried chiropractic and physical therapy with limited relief of symptoms.  Medications the patient has tried in the past include ibuprofen with limited relief. He describes   radicular symptoms in the bilateral L4 and L5 dermatomes but otherwise has good strength.  He endorses weakness numbness or paresthesias. The patient denies any bowel or bladder dysfunction as well. The patient works as a  and states that he has significant debilitation with independent activities of daily living and overall function.    Additionally describes neck pain with arthritic features; pain with lateral rotation/extesion flexion. Chiropractic and PT did help with this pain in the past as have nsaids.    I have personally reviewed and/or updated the patient's past medical history, past surgical history, family history, social history, current medications, allergies, and vital signs  today.     Review of Systems   Constitutional:  Positive for activity change.   HENT: Negative.     Eyes: Negative.    Respiratory: Negative.     Cardiovascular: Negative.    Gastrointestinal: Negative.    Endocrine: Negative.    Genitourinary: Negative.    Musculoskeletal:  Positive for arthralgias, back pain, gait problem and myalgias.   Skin: Negative.    Allergic/Immunologic: Negative.    Neurological:  Positive for weakness and numbness.   Hematological: Negative.    Psychiatric/Behavioral: Negative.     All other systems reviewed and are negative.      Patient Active Problem List   Diagnosis    Essential hypertension    Cellulitis of left lower extremity    Lumbar spondylosis    Lumbar radiculopathy       Past Medical History:   Diagnosis Date    Arthritis     Hypertension     Migraines        Past Surgical History:   Procedure Laterality Date    FL GUIDED NEEDLE PLAC BX/ASP/INJ  3/1/2022    FL GUIDED NEEDLE PLAC BX/ASP/INJ  11/1/2022    IR SPINE AND PAIN PROCEDURE  1/30/2024    IR SPINE AND PAIN PROCEDURE  8/20/2024    IR SPINE AND PAIN PROCEDURE  9/24/2024    IR SPINE AND PAIN PROCEDURE  4/8/2025    NERVE BLOCK Bilateral 12/28/2021    Procedure: BLOCK MEDIAL BRANCH Bilateral L3, L4, L5 #1;  Surgeon: Fermín Lares MD;  Location: [facility];  Service: Pain Management     NERVE BLOCK Bilateral 1/20/2022    Procedure: BLOCK MEDIAL BRANCH Bilateral L3, L4, L5 #2;  Surgeon: Fermín Lares MD;  Location: [facility];  Service: Pain Management     DE INJECT SI JOINT ARTHRGRPHY&/ANES/STEROID W/BETTYE Right 12/2/2021    Procedure: Interlaminar epidural steroid injection at L4-L5 ;  Surgeon: Fermín Lares MD;  Location: [facility];  Service: Pain Management     RHIZOTOMY Bilateral 3/1/2022    Procedure: RHIZOTOMY LUMBAR L3, L4, L5;  Surgeon: Fermín Lares MD;  Location: [facility];  Service: Pain Management     RHIZOTOMY Bilateral 11/1/2022    Procedure: RHIZOTOMY LUMBAR L3, L4, L5 medial branch nerves;  Surgeon: Fermín  "MD Luda;  Location: [facility];  Service: Pain Management     RHIZOTOMY Bilateral 2023    Procedure: RHIZOTOMY LUMBAR L3, L4, L5 medial branch nerves;  Surgeon: Fermín Lares MD;  Location: [facility];  Service: Pain Management     SHOULDER SURGERY Left        Family History   Problem Relation Age of Onset    Diabetes Mother     Migraines Mother     Hypertension Father        Social History     Occupational History    Not on file   Tobacco Use    Smoking status: Former     Current packs/day: 0.00     Average packs/day: 1 pack/day for 10.0 years (10.0 ttl pk-yrs)     Types: Cigarettes     Start date: 1990     Quit date: 2000     Years since quittin.9    Smokeless tobacco: Never   Vaping Use    Vaping status: Never Used   Substance and Sexual Activity    Alcohol use: Not Currently    Drug use: Yes     Types: Marijuana     Comment: Occasional marijuana    Sexual activity: Not on file       Current Outpatient Medications on File Prior to Visit   Medication Sig    hydrochlorothiazide (HYDRODIURIL) 50 mg tablet Take 50 mg by mouth daily    losartan (COZAAR) 50 mg tablet Take 50 mg by mouth daily    meloxicam (MOBIC) 7.5 mg tablet Take 1 tablet (7.5 mg total) by mouth 2 (two) times a day as needed for moderate pain    omeprazole (PriLOSEC) 20 mg delayed release capsule Take 20 mg by mouth daily    SUMAtriptan (IMITREX) 25 mg tablet As needed     No current facility-administered medications on file prior to visit.       Allergies   Allergen Reactions    Penicillins Hives     Hives           Physical Exam    Ht 5' 11\" (1.803 m)   Wt 92.5 kg (204 lb)   BMI 28.45 kg/m²     Constitutional: normal, well developed, well nourished, alert, in no distress and non-toxic and no overt pain behavior. and overweight  Eyes: anicteric  HEENT: grossly intact  Neck: supple, symmetric, trachea midline and no masses   Pulmonary:even and unlabored  Cardiovascular:No edema or pitting edema present  Skin:Normal without " rashes or lesions and well hydrated  Psychiatric:Mood and affect appropriate  Neurologic:Cranial Nerves II-XII grossly intact Sensation grossly intact; no clonus negative rodas's. Reflexes 2+ and brisk. SLR negative bilaterally.  Musculoskeletal:normal gait. 5/5 strength in all extremities with active range of motion movements bilaterally. Normal heel toe and tip toe walking.  pain with positive facet loading maneuvers bilaterally and with lateral spine rotation. No ttp over lumbar paraspinal muscles. Positive right-sided sacroiliac joint loading, positive Rony fingers test, positive Gaenslen's test.      Imaging    MRI LUMBAR SPINE WITHOUT CONTRAST     INDICATION: M54.16: Radiculopathy, lumbar region.   Low back pain with numbness into the left leg.     COMPARISON:  None.     TECHNIQUE:  Sagittal T1, sagittal T2, sagittal inversion recovery, axial T1 and axial T2, coronal T2.    IMAGE QUALITY:  Diagnostic     FINDINGS:     VERTEBRAL BODIES:  There are 5 lumbar type vertebral bodies.  Levoscoliosis apex L1-2.  Slight retrolisthesis L1-2 also noted.  Modic type I degenerative marrow signal L1-2 eccentric to the right.     SACRUM:  Normal signal within the sacrum. No evidence of insufficiency or stress fracture.     DISTAL CORD AND CONUS:  Normal size and signal within the distal cord and conus.     PARASPINAL SOFT TISSUES:  Paraspinal soft tissues are unremarkable.     LOWER THORACIC DISC SPACES:  Normal disc height and signal.  No disc herniation, canal stenosis or foraminal narrowing.     LUMBAR DISC SPACES:     L1-L2:  Degenerative disc osteophyte complex with marginal osteophytes noted with a superimposed right paramedian protrusion type disc herniation.  There is moderate right lateral recess stenosis.  Correlate for right L2 radiculopathy.  Severe right and   mild left foraminal narrowing with mild central stenosis.     L2-L3:  Degenerative disc osteophyte complex with marginal osteophytes results in mild  bilateral foraminal narrowing and mild central stenosis.  Mild/moderate left lateral recess stenosis.     L3-L4:  Mild annular bulge small marginal osteophytes result in moderate right and mild to moderate left foraminal narrowing with mild central stenosis.     L4-L5:  Moderate to severe left and mild right facet hypertrophy identified.  There is a disc osteophyte complex with marginal osteophytes resulting in moderate to severe left foraminal narrowing.  Correlate for left L4 radiculopathy.  Mild central   stenosis and moderate left lateral recess stenosis noted.     L5-S1:  Moderate facet hypertrophy identified.  There is annular bulging and small marginal osteophytes resulting in mild right foraminal narrowing and minimal central stenosis.     IMPRESSION:     Spondylotic degenerative changes are seen throughout the lumbar spine.  There is right lateral recess stenosis at L1-2 secondary to right paramedian protrusion type disc herniation with marginal osteophyte contributing to severe right foraminal   narrowing.  Choroid for right L1 or right 2 radiculopathy.     Severe left foraminal narrowing noted at L4-5 secondary to spondylotic degenerative changes.  Correlate for left L4 radiculopathy.     Levoscoliosis noted with its apex at L1-2.  Degenerative changes are noted at remaining lumbar levels as described.

## 2025-05-13 ENCOUNTER — TELEPHONE (OUTPATIENT)
Age: 55
End: 2025-05-13

## 2025-05-13 NOTE — TELEPHONE ENCOUNTER
Caller:  Patient    Doctor/Office: [facility]    Call regarding :  scheduling a procedure     Call was transferred to: [facility]

## 2025-05-14 ENCOUNTER — OFFICE VISIT (OUTPATIENT)
Dept: PAIN MEDICINE | Facility: CLINIC | Age: 55
End: 2025-05-14
Payer: COMMERCIAL

## 2025-05-14 ENCOUNTER — PREP FOR PROCEDURE (OUTPATIENT)
Dept: PAIN MEDICINE | Facility: CLINIC | Age: 55
End: 2025-05-14

## 2025-05-14 VITALS — WEIGHT: 203 LBS | BODY MASS INDEX: 28.42 KG/M2 | HEIGHT: 71 IN

## 2025-05-14 DIAGNOSIS — M47.816 LUMBAR SPONDYLOSIS: Primary | ICD-10-CM

## 2025-05-14 PROCEDURE — 99214 OFFICE O/P EST MOD 30 MIN: CPT | Performed by: ANESTHESIOLOGY

## 2025-05-14 NOTE — PATIENT INSTRUCTIONS
"Patient Education     Back exercises   The Basics   Written by the doctors and editors at [facility]   Why do I need to do back exercises? -- Back exercises can help ease back pain and might help prevent future back pain. Long term, it is important to strengthen the muscles in your lower back, buttocks, and belly. These are your \"core muscles.\" Stretching exercises are also important to keep your muscles flexible.  Below are some stretching and strengthening exercises that might help you. Other forms of movement can help ease or prevent back pain, too. For example, some people like to walk, do aerobic exercise, or do yoga or ashley chi. The most important thing is to move your body. Your doctor, nurse, or physical therapist can help you find different types of activity that work for you.  What stretching exercises should I do? -- Below are some examples of stretching exercises. Warm up your muscles before stretching to help prevent injury. To warm up, you can walk, jog, or cycle for a few minutes.  Start by repeating each of these stretches 2 to 3 times. Try to hold each stretch for 5 to 10 seconds, and try to do the stretches 2 to 3 times each day. Breathe slowly and deeply as you do the exercises. Never bounce when doing stretches.   Cat-cow stretch (figure 1) - Start on all fours on the floor, with your hands under your shoulders, knees under your hips, and your back flat. First, tuck your chin and tighten your stomach muscles as you round your back (like a \"cat\"). Hold the stretch for about 10 seconds. Rest for a few seconds as you flatten your back. Next, lift your chin and let your belly and lower back sink toward the floor (like a \"cow\"). Hold the stretch for about 10 seconds.   Single knee-to-chest stretches (figure 2) ? While lying on your back, bend your knees with your feet flat on the floor. Pull 1 knee toward your chest until you feel a stretch in your lower back and buttock area. Lower, and repeat with the " other knee. If you have knee problems, pull your knee up by grabbing the back of your thigh instead of the front of your knee. You can also do this exercise by grabbing both knees at the same time.   Lower trunk rotations (figure 3) ? While lying on your back, bend your knees with your feet flat on the floor. Keep your knees and ankles together, and then drop them to 1 side. Keep both of your shoulders touching the floor until you feel a stretch in the muscles at the side of the back. Repeat on the other side.   Lower back stretches seated (figure 4) ? Sit in a chair with your feet spread about shoulder width apart. Then, lean forward until you feel a stretch in your lower back.  What strengthening exercises should I do? -- Below are some examples of strengthening exercises.  Start by doing each exercise 2 to 3 times. Work up to doing each exercise 10 times. Hold each exercise for 3 to 5 seconds, and try to do the exercises 2 to 3 times each day. Do all exercises slowly.   Shoulder blade squeezes (figure 5) ? Pinch your shoulder blades together on your upper back, and hold 3 to 5 seconds. You can also do these 1 side at a time. Sit with good posture, and make sure that your shoulders do not rise up when you do this exercise. Relax.   Pelvic tilts (figure 6) ? Lie on your back with your knees bent and feet flat on the floor. Tighten your stomach muscles, and press your lower back down to the floor. Relax. You should be able to breathe comfortably during this exercise.   Hip lifts (figure 7) ? Lie on your back with your knees bent and feet flat on the floor. Tighten your stomach muscles, keep your back flat, and lift your buttocks off of the floor. Relax. You should feel this in your buttocks, not in your lower back.  What else should I know?    Exercise, including stretching, might be slightly uncomfortable. But you should not have sharp or severe pain. If you do get severe pain, stop what you are doing. If severe  "pain continues, call your doctor or nurse.   Do not hold your breath when exercising. If you tend to hold your breath, try counting out loud when exercising. If any exercise bothers you, stop right away.   Always warm up before exercising. Warm muscles stretch much easier than cool muscles. Stretching cool muscles can lead to injury.   Doing exercises before a meal can be a good way to get into a routine.  All topics are updated as new evidence becomes available and our peer review process is complete.  This topic retrieved from [employer] on: Apr 03, 2024.  Topic 200027 Version 2.0  Release: 32.2.4 - C32.92  © 2024 [employer]Date, Inc. and/or its affiliates. All rights reserved.  figure 1: Cat-cow stretch     Start on all fours on the floor, with hands under your shoulders, knees under your hips, and your back flat. First, tuck your chin and tighten your stomach muscles as you round your back (like a \"cat\"). Hold the stretch for about 10 seconds. Rest for a few seconds as you flatten your back. Next, lift your chin and let your belly and lower back sink toward the floor (like a \"cow\"). Hold the stretch for about 10 seconds.  Graphic [id number] Version 1.0  figure 2: Single knee-to-chest stretch     Lie on your back, bend your knees, and have your feet flat on the floor. Pull 1 knee toward your chest until you feel a stretch in your lower back and buttock area. Repeat with the other knee. If you have knee problems, pull your knee up by grabbing the back of your thigh instead of the front of your knee. You can also do this exercise by grabbing both knees at the same time.  Graphic [id number] Version 1.0  figure 3: Lower trunk rotation     While lying on your back, bend your knees and have your feet flat on the floor. Keep your legs together and then drop them to 1 side. Keep both of your shoulders touching the floor until you feel a stretch in the muscles at the side of the back. Repeat on the other side.  Graphic [id number] Version " 1.0  figure 4: Lower back stretch     Sit in a chair with your feet spread about shoulder width apart. Then, lean forward until you feel a stretch in your lower back.  Graphic [id number] Version 1.0  figure 5: Shoulder blade squeezes     Pinch your shoulder blades together on your upper back and hold for 3 to 5 seconds. Make sure that you are sitting with good posture and that your shoulders do not raise up when you do this exercise. Relax.  Graphic [id number] Version 1.0  figure 6: Pelvic tilts     Lie on your back with your knees bent and feet flat on the floor. Tighten your stomach muscles and press your lower back down to the floor. Relax.  Graphic [id number] Version 1.0  figure 7: Hip lifts     Lie on your back with your knees bent and feet flat on the floor. Tighten your stomach muscles and lift your buttocks off of the floor. Relax.  Graphic [id number] Version 1.0  Consumer Information Use and Disclaimer   Disclaimer: This generalized information is a limited summary of diagnosis, treatment, and/or medication information. It is not meant to be comprehensive and should be used as a tool to help the user understand and/or assess potential diagnostic and treatment options. It does NOT include all information about conditions, treatments, medications, side effects, or risks that may apply to a specific patient. It is not intended to be medical advice or a substitute for the medical advice, diagnosis, or treatment of a health care provider based on the health care provider's examination and assessment of a patient's specific and unique circumstances. Patients must speak with a health care provider for complete information about their health, medical questions, and treatment options, including any risks or benefits regarding use of medications. This information does not endorse any treatments or medications as safe, effective, or approved for treating a specific patient. Up[employer], Inc[employer] and its affiliates disclaim any warranty or  liability relating to this information or the use thereof.The use of this information is governed by the Terms of Use, available at https://www.wolterskluwer.com/en/know/clinical-effectiveness-terms. 2024© UpToDate, Inc. and its affiliates and/or licensors. All rights reserved.  Copyright   © 2024 [employer] and/or its affiliates. All rights reserved.

## 2025-05-14 NOTE — H&P (VIEW-ONLY)
Assessment  1. Lumbar spondylosis    Greater than 90% relief of pain with improved ability to participate with IADLs after bilateral L3, L4, L5 medial branch nerve radiofrequency ablation performed 9/24/24  with return of pain after nearly 8 months of relief. Continues to describe axial low back pain with aching, nagging, indolent, stabbing, and characteristics.  Positive facet loading maneuvers concordant with prominent lumbar facet arthropathy seen throughout lumbar spine on MRI. Meloxicam prn providing significant improvement with IADLs without side effects. Previously reported the following symptomatology:     Right low axial back pain in the area of right sacroiliac joint.  Positive right-sided sacroiliac joint loading, positive Rony fingers test, positive Gaenslen's test.  Aching, nagging, indolent, stabbing, throbbing pain.  Additionally describes symptomatology of leg weakness and numbness that has been progressive.  The pain resembles neurogenic claudication.  No pertinent imaging available to review; however, pain with positive facet loading maneuvers bilaterally.  5/5 strength in all extremities with active range of motion movements, negative SLR bilaterally.  He has failed more than 12 weeks of conservative therapy including physical and chiropractic therapy.  Reasonable at this time to proceed with multimodal pain therapy plan while obtaining MRI lumbar spine noncontrast to guide interventional therapy.    Plan  -Bilateral L3, L4, L5 medial branch nerve radiofrequency ablation; f/u 2 weeks post procedure  -gabapentin and lyrica discontinued. counseled regarding sedative effects of taking this medication and provided up titration calendar.  Counseled not to take medication while driving or operating heavy machinery/using stairs  -DME TENS  -Meloxicam 7.5 mg b.i.d. prn pain previously prescribed. Patient takes infrequently, 1-2x a week; counseled to let us know if he takes it every day so gi ppx can  be written. Patient educated regarding bleeding risk of taking this medication not taking any other nonsteroidal anti-inflammatory medications while taking this medication; counseled thoroughly regarding potential risk of Cardiovascular injury, Kidney injury, Gastrointestinal ulceration/bleeding. Patient voiced understanding  -has completed physical therapy for right-sided lumbar radiculopathy, lumbar facet arthropathy; Core exercises additionally provided for physician directed home PT that the patient plans to participate with for 1 hour, twice a week for the next 6 weeks.     There are risks associated with opioid medications, including dependence, addiction and tolerance. The patient understands and agrees to use these medications only as prescribed. Potential side effects of the medications include, but are not limited to, constipation, drowsiness, addiction, impaired judgment and risk of fatal overdose if not taken as prescribed. The patient was warned against driving while taking sedation medications.  Sharing medications is a felony. At this point in time, the patient is showing no signs of addiction, abuse, diversion or suicidal ideation.    [place] Prescription Drug Monitoring Program report was reviewed and was appropriate     Complete risks and benefits including bleeding, infection, tissue reaction, nerve injury and allergic reaction were discussed. The approach was demonstrated using models and literature was provided. Verbal and written consent was obtained.    My impressions and treatment recommendations were discussed in detail with the patient who verbalized understanding and had no further questions.  Discharge instructions were provided. I personally saw and examined the patient and I agree with the above discussed plan of care.    No orders of the defined types were placed in this encounter.      History of Present Illness    Felix Grajeda is a 54 y.o. male with pmhx of migraines, HTN  presenting with right low axial back pain described primarily arthritic features. He describes 8/10 right low back pain that is worse in the mornings and worse at the end of the day.  The pain is characterized by achy, nagging, indolent, crampy, stabbing pain in his axial right low back in the region of the sacroiliac joint.  The patient describes that the pain is worse with standing for long periods of time on hard surfaces as well as with walking.  The patient is a very active individual and feels as though this pain compromises his  participation with independent activities of daily living. The pain can be debilitating at times and contribute to significant disability, compromising overall activity and independent activities of daily living. He has tried chiropractic and physical therapy with limited relief of symptoms.  Medications the patient has tried in the past include ibuprofen with limited relief. He describes   radicular symptoms in the bilateral L4 and L5 dermatomes but otherwise has good strength.  He endorses weakness numbness or paresthesias. The patient denies any bowel or bladder dysfunction as well. The patient works as a  and states that he has significant debilitation with independent activities of daily living and overall function.    Additionally describes neck pain with arthritic features; pain with lateral rotation/extesion flexion. Chiropractic and PT did help with this pain in the past as have nsaids.    I have personally reviewed and/or updated the patient's past medical history, past surgical history, family history, social history, current medications, allergies, and vital signs today.     Review of Systems   Constitutional:  Positive for activity change.   HENT: Negative.     Eyes: Negative.    Respiratory: Negative.     Cardiovascular: Negative.    Gastrointestinal: Negative.    Endocrine: Negative.    Genitourinary: Negative.    Musculoskeletal:  Positive for  arthralgias, back pain, gait problem and myalgias.   Skin: Negative.    Allergic/Immunologic: Negative.    Neurological:  Positive for weakness and numbness.   Hematological: Negative.    Psychiatric/Behavioral: Negative.     All other systems reviewed and are negative.      Patient Active Problem List   Diagnosis    Essential hypertension    Cellulitis of left lower extremity    Lumbar spondylosis    Lumbar radiculopathy       Past Medical History:   Diagnosis Date    Arthritis     Hypertension     Migraines        Past Surgical History:   Procedure Laterality Date    FL GUIDED NEEDLE PLAC BX/ASP/INJ  3/1/2022    FL GUIDED NEEDLE PLAC BX/ASP/INJ  11/1/2022    IR SPINE AND PAIN PROCEDURE  1/30/2024    IR SPINE AND PAIN PROCEDURE  8/20/2024    IR SPINE AND PAIN PROCEDURE  9/24/2024    IR SPINE AND PAIN PROCEDURE  4/8/2025    NERVE BLOCK Bilateral 12/28/2021    Procedure: BLOCK MEDIAL BRANCH Bilateral L3, L4, L5 #1;  Surgeon: Fermín Lares MD;  Location: [facility];  Service: Pain Management     NERVE BLOCK Bilateral 1/20/2022    Procedure: BLOCK MEDIAL BRANCH Bilateral L3, L4, L5 #2;  Surgeon: Fermín Lares MD;  Location: [facility];  Service: Pain Management     MS INJECT SI JOINT ARTHRGRPHY&/ANES/STEROID W/BETTYE Right 12/2/2021    Procedure: Interlaminar epidural steroid injection at L4-L5 ;  Surgeon: Fermín Lares MD;  Location: [facility];  Service: Pain Management     RHIZOTOMY Bilateral 3/1/2022    Procedure: RHIZOTOMY LUMBAR L3, L4, L5;  Surgeon: Fermín Lares MD;  Location: [facility];  Service: Pain Management     RHIZOTOMY Bilateral 11/1/2022    Procedure: RHIZOTOMY LUMBAR L3, L4, L5 medial branch nerves;  Surgeon: Fermín Lares MD;  Location: [facility];  Service: Pain Management     RHIZOTOMY Bilateral 8/22/2023    Procedure: RHIZOTOMY LUMBAR L3, L4, L5 medial branch nerves;  Surgeon: Fermín Lares MD;  Location: [facility];  Service: Pain Management     SHOULDER SURGERY Left        Family History  "  Problem Relation Age of Onset    Diabetes Mother     Migraines Mother     Hypertension Father        Social History     Occupational History    Not on file   Tobacco Use    Smoking status: Former     Current packs/day: 0.00     Average packs/day: 1 pack/day for 10.0 years (10.0 ttl pk-yrs)     Types: Cigarettes     Start date: 1990     Quit date: 2000     Years since quittin.0    Smokeless tobacco: Never   Vaping Use    Vaping status: Never Used   Substance and Sexual Activity    Alcohol use: Not Currently    Drug use: Yes     Types: Marijuana     Comment: Occasional marijuana    Sexual activity: Not on file       Current Outpatient Medications on File Prior to Visit   Medication Sig    hydrochlorothiazide (HYDRODIURIL) 50 mg tablet Take 50 mg by mouth in the morning.    losartan (COZAAR) 50 mg tablet Take 50 mg by mouth in the morning.    meloxicam (MOBIC) 7.5 mg tablet Take 1 tablet (7.5 mg total) by mouth 2 (two) times a day as needed for moderate pain    omeprazole (PriLOSEC) 20 mg delayed release capsule Take 20 mg by mouth in the morning.    SUMAtriptan (IMITREX) 25 mg tablet As needed     No current facility-administered medications on file prior to visit.       Allergies   Allergen Reactions    Penicillins Hives     Hives           Physical Exam    Ht 5' 11\" (1.803 m)   Wt 92.1 kg (203 lb)   BMI 28.31 kg/m²     Constitutional: normal, well developed, well nourished, alert, in no distress and non-toxic and no overt pain behavior. and overweight  Eyes: anicteric  HEENT: grossly intact  Neck: supple, symmetric, trachea midline and no masses   Pulmonary:even and unlabored  Cardiovascular:No edema or pitting edema present  Skin:Normal without rashes or lesions and well hydrated  Psychiatric:Mood and affect appropriate  Neurologic:Cranial Nerves II-XII grossly intact Sensation grossly intact; no clonus negative rodas's. Reflexes 2+ and brisk. SLR negative bilaterally.  Musculoskeletal:normal " gait. 5/5 strength in all extremities with active range of motion movements bilaterally. Normal heel toe and tip toe walking.  pain with positive facet loading maneuvers bilaterally and with lateral spine rotation. No ttp over lumbar paraspinal muscles. Positive right-sided sacroiliac joint loading, positive Rony fingers test, positive Gaenslen's test.      Imaging    MRI LUMBAR SPINE WITHOUT CONTRAST     INDICATION: M54.16: Radiculopathy, lumbar region.   Low back pain with numbness into the left leg.     COMPARISON:  None.     TECHNIQUE:  Sagittal T1, sagittal T2, sagittal inversion recovery, axial T1 and axial T2, coronal T2.    IMAGE QUALITY:  Diagnostic     FINDINGS:     VERTEBRAL BODIES:  There are 5 lumbar type vertebral bodies.  Levoscoliosis apex L1-2.  Slight retrolisthesis L1-2 also noted.  Modic type I degenerative marrow signal L1-2 eccentric to the right.     SACRUM:  Normal signal within the sacrum. No evidence of insufficiency or stress fracture.     DISTAL CORD AND CONUS:  Normal size and signal within the distal cord and conus.     PARASPINAL SOFT TISSUES:  Paraspinal soft tissues are unremarkable.     LOWER THORACIC DISC SPACES:  Normal disc height and signal.  No disc herniation, canal stenosis or foraminal narrowing.     LUMBAR DISC SPACES:     L1-L2:  Degenerative disc osteophyte complex with marginal osteophytes noted with a superimposed right paramedian protrusion type disc herniation.  There is moderate right lateral recess stenosis.  Correlate for right L2 radiculopathy.  Severe right and   mild left foraminal narrowing with mild central stenosis.     L2-L3:  Degenerative disc osteophyte complex with marginal osteophytes results in mild bilateral foraminal narrowing and mild central stenosis.  Mild/moderate left lateral recess stenosis.     L3-L4:  Mild annular bulge small marginal osteophytes result in moderate right and mild to moderate left foraminal narrowing with mild central  stenosis.     L4-L5:  Moderate to severe left and mild right facet hypertrophy identified.  There is a disc osteophyte complex with marginal osteophytes resulting in moderate to severe left foraminal narrowing.  Correlate for left L4 radiculopathy.  Mild central   stenosis and moderate left lateral recess stenosis noted.     L5-S1:  Moderate facet hypertrophy identified.  There is annular bulging and small marginal osteophytes resulting in mild right foraminal narrowing and minimal central stenosis.     IMPRESSION:     Spondylotic degenerative changes are seen throughout the lumbar spine.  There is right lateral recess stenosis at L1-2 secondary to right paramedian protrusion type disc herniation with marginal osteophyte contributing to severe right foraminal   narrowing.  Choroid for right L1 or right 2 radiculopathy.     Severe left foraminal narrowing noted at L4-5 secondary to spondylotic degenerative changes.  Correlate for left L4 radiculopathy.     Levoscoliosis noted with its apex at L1-2.  Degenerative changes are noted at remaining lumbar levels as described.

## 2025-05-14 NOTE — PROGRESS NOTES
Assessment  1. Lumbar spondylosis    Greater than 90% relief of pain with improved ability to participate with IADLs after bilateral L3, L4, L5 medial branch nerve radiofrequency ablation performed 9/24/24  with return of pain after nearly 8 months of relief. Continues to describe axial low back pain with aching, nagging, indolent, stabbing, and characteristics.  Positive facet loading maneuvers concordant with prominent lumbar facet arthropathy seen throughout lumbar spine on MRI. Meloxicam prn providing significant improvement with IADLs without side effects. Previously reported the following symptomatology:     Right low axial back pain in the area of right sacroiliac joint.  Positive right-sided sacroiliac joint loading, positive Rony fingers test, positive Gaenslen's test.  Aching, nagging, indolent, stabbing, throbbing pain.  Additionally describes symptomatology of leg weakness and numbness that has been progressive.  The pain resembles neurogenic claudication.  No pertinent imaging available to review; however, pain with positive facet loading maneuvers bilaterally.  5/5 strength in all extremities with active range of motion movements, negative SLR bilaterally.  He has failed more than 12 weeks of conservative therapy including physical and chiropractic therapy.  Reasonable at this time to proceed with multimodal pain therapy plan while obtaining MRI lumbar spine noncontrast to guide interventional therapy.    Plan  -Bilateral L3, L4, L5 medial branch nerve radiofrequency ablation; f/u 2 weeks post procedure  -gabapentin and lyrica discontinued. counseled regarding sedative effects of taking this medication and provided up titration calendar.  Counseled not to take medication while driving or operating heavy machinery/using stairs  -DME TENS  -Meloxicam 7.5 mg b.i.d. prn pain previously prescribed. Patient takes infrequently, 1-2x a week; counseled to let us know if he takes it every day so gi ppx can  be written. Patient educated regarding bleeding risk of taking this medication not taking any other nonsteroidal anti-inflammatory medications while taking this medication; counseled thoroughly regarding potential risk of Cardiovascular injury, Kidney injury, Gastrointestinal ulceration/bleeding. Patient voiced understanding  -has completed physical therapy for right-sided lumbar radiculopathy, lumbar facet arthropathy; Core exercises additionally provided for physician directed home PT that the patient plans to participate with for 1 hour, twice a week for the next 6 weeks.     There are risks associated with opioid medications, including dependence, addiction and tolerance. The patient understands and agrees to use these medications only as prescribed. Potential side effects of the medications include, but are not limited to, constipation, drowsiness, addiction, impaired judgment and risk of fatal overdose if not taken as prescribed. The patient was warned against driving while taking sedation medications.  Sharing medications is a felony. At this point in time, the patient is showing no signs of addiction, abuse, diversion or suicidal ideation.    [place] Prescription Drug Monitoring Program report was reviewed and was appropriate     Complete risks and benefits including bleeding, infection, tissue reaction, nerve injury and allergic reaction were discussed. The approach was demonstrated using models and literature was provided. Verbal and written consent was obtained.    My impressions and treatment recommendations were discussed in detail with the patient who verbalized understanding and had no further questions.  Discharge instructions were provided. I personally saw and examined the patient and I agree with the above discussed plan of care.    No orders of the defined types were placed in this encounter.      History of Present Illness    Felix Grajeda is a 54 y.o. male with pmhx of migraines, HTN  presenting with right low axial back pain described primarily arthritic features. He describes 8/10 right low back pain that is worse in the mornings and worse at the end of the day.  The pain is characterized by achy, nagging, indolent, crampy, stabbing pain in his axial right low back in the region of the sacroiliac joint.  The patient describes that the pain is worse with standing for long periods of time on hard surfaces as well as with walking.  The patient is a very active individual and feels as though this pain compromises his  participation with independent activities of daily living. The pain can be debilitating at times and contribute to significant disability, compromising overall activity and independent activities of daily living. He has tried chiropractic and physical therapy with limited relief of symptoms.  Medications the patient has tried in the past include ibuprofen with limited relief. He describes   radicular symptoms in the bilateral L4 and L5 dermatomes but otherwise has good strength.  He endorses weakness numbness or paresthesias. The patient denies any bowel or bladder dysfunction as well. The patient works as a  and states that he has significant debilitation with independent activities of daily living and overall function.    Additionally describes neck pain with arthritic features; pain with lateral rotation/extesion flexion. Chiropractic and PT did help with this pain in the past as have nsaids.    I have personally reviewed and/or updated the patient's past medical history, past surgical history, family history, social history, current medications, allergies, and vital signs today.     Review of Systems   Constitutional:  Positive for activity change.   HENT: Negative.     Eyes: Negative.    Respiratory: Negative.     Cardiovascular: Negative.    Gastrointestinal: Negative.    Endocrine: Negative.    Genitourinary: Negative.    Musculoskeletal:  Positive for  arthralgias, back pain, gait problem and myalgias.   Skin: Negative.    Allergic/Immunologic: Negative.    Neurological:  Positive for weakness and numbness.   Hematological: Negative.    Psychiatric/Behavioral: Negative.     All other systems reviewed and are negative.      Patient Active Problem List   Diagnosis    Essential hypertension    Cellulitis of left lower extremity    Lumbar spondylosis    Lumbar radiculopathy       Past Medical History:   Diagnosis Date    Arthritis     Hypertension     Migraines        Past Surgical History:   Procedure Laterality Date    FL GUIDED NEEDLE PLAC BX/ASP/INJ  3/1/2022    FL GUIDED NEEDLE PLAC BX/ASP/INJ  11/1/2022    IR SPINE AND PAIN PROCEDURE  1/30/2024    IR SPINE AND PAIN PROCEDURE  8/20/2024    IR SPINE AND PAIN PROCEDURE  9/24/2024    IR SPINE AND PAIN PROCEDURE  4/8/2025    NERVE BLOCK Bilateral 12/28/2021    Procedure: BLOCK MEDIAL BRANCH Bilateral L3, L4, L5 #1;  Surgeon: Fermín Lares MD;  Location: [facility];  Service: Pain Management     NERVE BLOCK Bilateral 1/20/2022    Procedure: BLOCK MEDIAL BRANCH Bilateral L3, L4, L5 #2;  Surgeon: Fermín Lares MD;  Location: [facility];  Service: Pain Management     TN INJECT SI JOINT ARTHRGRPHY&/ANES/STEROID W/BETTYE Right 12/2/2021    Procedure: Interlaminar epidural steroid injection at L4-L5 ;  Surgeon: Fermín Lares MD;  Location: [facility];  Service: Pain Management     RHIZOTOMY Bilateral 3/1/2022    Procedure: RHIZOTOMY LUMBAR L3, L4, L5;  Surgeon: Fermín Lares MD;  Location: [facility];  Service: Pain Management     RHIZOTOMY Bilateral 11/1/2022    Procedure: RHIZOTOMY LUMBAR L3, L4, L5 medial branch nerves;  Surgeon: Fermín Lares MD;  Location: [facility];  Service: Pain Management     RHIZOTOMY Bilateral 8/22/2023    Procedure: RHIZOTOMY LUMBAR L3, L4, L5 medial branch nerves;  Surgeon: Fermín Lares MD;  Location: [facility];  Service: Pain Management     SHOULDER SURGERY Left        Family History  "  Problem Relation Age of Onset    Diabetes Mother     Migraines Mother     Hypertension Father        Social History     Occupational History    Not on file   Tobacco Use    Smoking status: Former     Current packs/day: 0.00     Average packs/day: 1 pack/day for 10.0 years (10.0 ttl pk-yrs)     Types: Cigarettes     Start date: 1990     Quit date: 2000     Years since quittin.0    Smokeless tobacco: Never   Vaping Use    Vaping status: Never Used   Substance and Sexual Activity    Alcohol use: Not Currently    Drug use: Yes     Types: Marijuana     Comment: Occasional marijuana    Sexual activity: Not on file       Current Outpatient Medications on File Prior to Visit   Medication Sig    hydrochlorothiazide (HYDRODIURIL) 50 mg tablet Take 50 mg by mouth in the morning.    losartan (COZAAR) 50 mg tablet Take 50 mg by mouth in the morning.    meloxicam (MOBIC) 7.5 mg tablet Take 1 tablet (7.5 mg total) by mouth 2 (two) times a day as needed for moderate pain    omeprazole (PriLOSEC) 20 mg delayed release capsule Take 20 mg by mouth in the morning.    SUMAtriptan (IMITREX) 25 mg tablet As needed     No current facility-administered medications on file prior to visit.       Allergies   Allergen Reactions    Penicillins Hives     Hives           Physical Exam    Ht 5' 11\" (1.803 m)   Wt 92.1 kg (203 lb)   BMI 28.31 kg/m²     Constitutional: normal, well developed, well nourished, alert, in no distress and non-toxic and no overt pain behavior. and overweight  Eyes: anicteric  HEENT: grossly intact  Neck: supple, symmetric, trachea midline and no masses   Pulmonary:even and unlabored  Cardiovascular:No edema or pitting edema present  Skin:Normal without rashes or lesions and well hydrated  Psychiatric:Mood and affect appropriate  Neurologic:Cranial Nerves II-XII grossly intact Sensation grossly intact; no clonus negative rodas's. Reflexes 2+ and brisk. SLR negative bilaterally.  Musculoskeletal:normal " gait. 5/5 strength in all extremities with active range of motion movements bilaterally. Normal heel toe and tip toe walking.  pain with positive facet loading maneuvers bilaterally and with lateral spine rotation. No ttp over lumbar paraspinal muscles. Positive right-sided sacroiliac joint loading, positive Rony fingers test, positive Gaenslen's test.      Imaging    MRI LUMBAR SPINE WITHOUT CONTRAST     INDICATION: M54.16: Radiculopathy, lumbar region.   Low back pain with numbness into the left leg.     COMPARISON:  None.     TECHNIQUE:  Sagittal T1, sagittal T2, sagittal inversion recovery, axial T1 and axial T2, coronal T2.    IMAGE QUALITY:  Diagnostic     FINDINGS:     VERTEBRAL BODIES:  There are 5 lumbar type vertebral bodies.  Levoscoliosis apex L1-2.  Slight retrolisthesis L1-2 also noted.  Modic type I degenerative marrow signal L1-2 eccentric to the right.     SACRUM:  Normal signal within the sacrum. No evidence of insufficiency or stress fracture.     DISTAL CORD AND CONUS:  Normal size and signal within the distal cord and conus.     PARASPINAL SOFT TISSUES:  Paraspinal soft tissues are unremarkable.     LOWER THORACIC DISC SPACES:  Normal disc height and signal.  No disc herniation, canal stenosis or foraminal narrowing.     LUMBAR DISC SPACES:     L1-L2:  Degenerative disc osteophyte complex with marginal osteophytes noted with a superimposed right paramedian protrusion type disc herniation.  There is moderate right lateral recess stenosis.  Correlate for right L2 radiculopathy.  Severe right and   mild left foraminal narrowing with mild central stenosis.     L2-L3:  Degenerative disc osteophyte complex with marginal osteophytes results in mild bilateral foraminal narrowing and mild central stenosis.  Mild/moderate left lateral recess stenosis.     L3-L4:  Mild annular bulge small marginal osteophytes result in moderate right and mild to moderate left foraminal narrowing with mild central  stenosis.     L4-L5:  Moderate to severe left and mild right facet hypertrophy identified.  There is a disc osteophyte complex with marginal osteophytes resulting in moderate to severe left foraminal narrowing.  Correlate for left L4 radiculopathy.  Mild central   stenosis and moderate left lateral recess stenosis noted.     L5-S1:  Moderate facet hypertrophy identified.  There is annular bulging and small marginal osteophytes resulting in mild right foraminal narrowing and minimal central stenosis.     IMPRESSION:     Spondylotic degenerative changes are seen throughout the lumbar spine.  There is right lateral recess stenosis at L1-2 secondary to right paramedian protrusion type disc herniation with marginal osteophyte contributing to severe right foraminal   narrowing.  Choroid for right L1 or right 2 radiculopathy.     Severe left foraminal narrowing noted at L4-5 secondary to spondylotic degenerative changes.  Correlate for left L4 radiculopathy.     Levoscoliosis noted with its apex at L1-2.  Degenerative changes are noted at remaining lumbar levels as described.

## 2025-05-27 ENCOUNTER — TELEPHONE (OUTPATIENT)
Dept: PAIN MEDICINE | Facility: CLINIC | Age: 55
End: 2025-05-27

## 2025-05-27 ENCOUNTER — HOSPITAL ENCOUNTER (OUTPATIENT)
Dept: INTERVENTIONAL RADIOLOGY/VASCULAR | Facility: HOSPITAL | Age: 55
Discharge: HOME | End: 2025-05-27
Attending: ANESTHESIOLOGY
Payer: COMMERCIAL

## 2025-05-27 VITALS
TEMPERATURE: 99 F | OXYGEN SATURATION: 99 % | HEIGHT: 71 IN | SYSTOLIC BLOOD PRESSURE: 135 MMHG | WEIGHT: 203 LBS | BODY MASS INDEX: 28.42 KG/M2 | DIASTOLIC BLOOD PRESSURE: 84 MMHG | HEART RATE: 60 BPM | RESPIRATION RATE: 18 BRPM

## 2025-05-27 DIAGNOSIS — M47.816 LUMBAR SPONDYLOSIS: ICD-10-CM

## 2025-05-27 PROCEDURE — 64636 DESTROY L/S FACET JNT ADDL: CPT | Performed by: ANESTHESIOLOGY

## 2025-05-27 PROCEDURE — 64635 DESTROY LUMB/SAC FACET JNT: CPT | Performed by: ANESTHESIOLOGY

## 2025-05-27 RX ORDER — LIDOCAINE HYDROCHLORIDE 20 MG/ML
INJECTION, SOLUTION EPIDURAL; INFILTRATION; INTRACAUDAL; PERINEURAL AS NEEDED
Status: COMPLETED | OUTPATIENT
Start: 2025-05-27 | End: 2025-05-27

## 2025-05-27 RX ORDER — BUPIVACAINE HYDROCHLORIDE 2.5 MG/ML
INJECTION, SOLUTION EPIDURAL; INFILTRATION; INTRACAUDAL; PERINEURAL AS NEEDED
Status: COMPLETED | OUTPATIENT
Start: 2025-05-27 | End: 2025-05-27

## 2025-05-27 RX ORDER — METHYLPREDNISOLONE ACETATE 80 MG/ML
INJECTION, SUSPENSION INTRA-ARTICULAR; INTRALESIONAL; INTRAMUSCULAR; SOFT TISSUE AS NEEDED
Status: COMPLETED | OUTPATIENT
Start: 2025-05-27 | End: 2025-05-27

## 2025-05-27 RX ORDER — LIDOCAINE HYDROCHLORIDE 10 MG/ML
INJECTION, SOLUTION EPIDURAL; INFILTRATION; INTRACAUDAL; PERINEURAL AS NEEDED
Status: COMPLETED | OUTPATIENT
Start: 2025-05-27 | End: 2025-05-27

## 2025-05-27 RX ADMIN — LIDOCAINE HYDROCHLORIDE 10 ML: 10 INJECTION, SOLUTION EPIDURAL; INFILTRATION; INTRACAUDAL; PERINEURAL at 13:17

## 2025-05-27 RX ADMIN — METHYLPREDNISOLONE ACETATE 80 MG: 80 INJECTION, SUSPENSION INTRA-ARTICULAR; INTRALESIONAL; INTRAMUSCULAR; SOFT TISSUE at 13:17

## 2025-05-27 RX ADMIN — LIDOCAINE HYDROCHLORIDE 5 ML: 20 INJECTION, SOLUTION EPIDURAL; INFILTRATION; INTRACAUDAL; PERINEURAL at 13:17

## 2025-05-27 RX ADMIN — BUPIVACAINE HYDROCHLORIDE 5 ML: 2.5 INJECTION, SOLUTION EPIDURAL; INFILTRATION; INTRACAUDAL; PERINEURAL at 13:17

## 2025-05-27 NOTE — INTERVAL H&P NOTE
H&P reviewed. After examining the patient I find no changes in the patients condition since the H&P had been written.    Vitals:    05/27/25 1255   BP: 131/79   Pulse: 69   Resp: 18   Temp: (!) 97.2 °F (36.2 °C)   SpO2: 99%

## 2025-05-27 NOTE — DISCHARGE INSTR - AVS FIRST PAGE
YOUR 2 WEEK FOLLOW UP HAS BEEN SCHEDULED; IF YOU WISH TO CHANGE THE FOLLOW UP, PLEASE CALL THE [facility] AT [place]: [phone]    Lumbar Radiofrequency Ablation   WHAT YOU NEED TO KNOW:   Lumbar radiofrequency ablation (RFA) is a procedure used to treat facet joint pain in your lower back. Facet joints are found at the back of each vertebra. A needle electrode is used to send electrical currents to the nerves in your facet joint. The electrical currents create heat that damages the nerve so it cannot send pain signals.   DISCHARGE INSTRUCTIONS:   Seek care immediately if:   You cannot move your leg.    You cannot control your urine or bowel movements.    You have severe pain in your lower back.    Call your doctor if:   You have leg weakness.     You develop new symptoms.     You have questions or concerns about your condition or care.    Medicines:   Pain medicine  may be given. Ask how to take this medicine safely.    Take your medicine as directed.  Contact your healthcare provider if you think your medicine is not helping or if you have side effects. Tell your provider if you are allergic to any medicine. Keep a list of the medicines, vitamins, and herbs you take. Include the amounts, and when and why you take them. Bring the list or the pill bottles to follow-up visits. Carry your medicine list with you in case of an emergency.    Activity:  Do not drive a car or operate machinery within 24 hours after your procedure. Ask your healthcare provider about any other activities you should avoid.  Follow up with your doctor as directed:  Write down your questions so you remember to ask them during your visits.    RADIO FREQUENCY MEDIAL BRANCH NEUROTOMY DISCHARGE INSTRUCTIONS      ACTIVITY  Do not drive or operate machinery today.  No strenuous activity today - bending, lifting, etc.  You may show today, but do not sit in a tub of water.  Resume normal activities tomorrow as tolerated.    CARE OF  THE INJECTION SITE  If you have soreness or pain, apply ice to the area today (20 minute on/20 minutes off).  Starting tomorrow, you may use warm, moist heat or ice if needed.  Notify the [facility] if you have any of the following: redness, drainage, swelling or fever above 100°F.    SPECIAL INSTRUCTIONS  Our office will call you tomorrow for a progress report and make an appointment for a follow-up visit in 4 weeks.  If you feel a sunburn-like sensation in the area of your procedure, call our office.    MEDICATIONS  Continue to take all routine medications.  Our office may have instructed you to hold some medications.  You may resume _______________________________________________.    If you have any problems specifically related to your procedure, please call our office at [phone]. Problems not related to your procedure should be directed at your primary care physician.

## 2025-06-16 ENCOUNTER — TELEPHONE (OUTPATIENT)
Age: 55
End: 2025-06-16

## 2025-06-16 NOTE — TELEPHONE ENCOUNTER
Caller: Patient    Doctor/Office: [facility]    Call regarding :  medication request    Call was transferred to: [facility]

## 2025-06-23 ENCOUNTER — OFFICE VISIT (OUTPATIENT)
Dept: PAIN MEDICINE | Facility: CLINIC | Age: 55
End: 2025-06-23
Payer: COMMERCIAL

## 2025-06-23 VITALS — BODY MASS INDEX: 28.08 KG/M2 | WEIGHT: 200.6 LBS | HEIGHT: 71 IN

## 2025-06-23 DIAGNOSIS — M47.26 OTHER SPONDYLOSIS WITH RADICULOPATHY, LUMBAR REGION: Primary | ICD-10-CM

## 2025-06-23 PROCEDURE — 99213 OFFICE O/P EST LOW 20 MIN: CPT | Performed by: ANESTHESIOLOGY

## 2025-06-23 RX ORDER — CYCLOBENZAPRINE HCL 10 MG
10 TABLET ORAL 3 TIMES DAILY PRN
Qty: 90 TABLET | Refills: 0 | Status: SHIPPED | OUTPATIENT
Start: 2025-06-23

## 2025-06-23 NOTE — PATIENT INSTRUCTIONS
"Patient Education     Back exercises   The Basics   Written by the doctors and editors at [facility]   Why do I need to do back exercises? -- Back exercises can help ease back pain and might help prevent future back pain. Long term, it is important to strengthen the muscles in your lower back, buttocks, and belly. These are your \"core muscles.\" Stretching exercises are also important to keep your muscles flexible.  Below are some stretching and strengthening exercises that might help you. Other forms of movement can help ease or prevent back pain, too. For example, some people like to walk, do aerobic exercise, or do yoga or ashley chi. The most important thing is to move your body. Your doctor, nurse, or physical therapist can help you find different types of activity that work for you.  What stretching exercises should I do? -- Below are some examples of stretching exercises. Warm up your muscles before stretching to help prevent injury. To warm up, you can walk, jog, or cycle for a few minutes.  Start by repeating each of these stretches 2 to 3 times. Try to hold each stretch for 5 to 10 seconds, and try to do the stretches 2 to 3 times each day. Breathe slowly and deeply as you do the exercises. Never bounce when doing stretches.   Cat-cow stretch (figure 1) - Start on all fours on the floor, with your hands under your shoulders, knees under your hips, and your back flat. First, tuck your chin and tighten your stomach muscles as you round your back (like a \"cat\"). Hold the stretch for about 10 seconds. Rest for a few seconds as you flatten your back. Next, lift your chin and let your belly and lower back sink toward the floor (like a \"cow\"). Hold the stretch for about 10 seconds.   Single knee-to-chest stretches (figure 2) ? While lying on your back, bend your knees with your feet flat on the floor. Pull 1 knee toward your chest until you feel a stretch in your lower back and buttock area. Lower, and repeat with the " other knee. If you have knee problems, pull your knee up by grabbing the back of your thigh instead of the front of your knee. You can also do this exercise by grabbing both knees at the same time.   Lower trunk rotations (figure 3) ? While lying on your back, bend your knees with your feet flat on the floor. Keep your knees and ankles together, and then drop them to 1 side. Keep both of your shoulders touching the floor until you feel a stretch in the muscles at the side of the back. Repeat on the other side.   Lower back stretches seated (figure 4) ? Sit in a chair with your feet spread about shoulder width apart. Then, lean forward until you feel a stretch in your lower back.  What strengthening exercises should I do? -- Below are some examples of strengthening exercises.  Start by doing each exercise 2 to 3 times. Work up to doing each exercise 10 times. Hold each exercise for 3 to 5 seconds, and try to do the exercises 2 to 3 times each day. Do all exercises slowly.   Shoulder blade squeezes (figure 5) ? Pinch your shoulder blades together on your upper back, and hold 3 to 5 seconds. You can also do these 1 side at a time. Sit with good posture, and make sure that your shoulders do not rise up when you do this exercise. Relax.   Pelvic tilts (figure 6) ? Lie on your back with your knees bent and feet flat on the floor. Tighten your stomach muscles, and press your lower back down to the floor. Relax. You should be able to breathe comfortably during this exercise.   Hip lifts (figure 7) ? Lie on your back with your knees bent and feet flat on the floor. Tighten your stomach muscles, keep your back flat, and lift your buttocks off of the floor. Relax. You should feel this in your buttocks, not in your lower back.  What else should I know?    Exercise, including stretching, might be slightly uncomfortable. But you should not have sharp or severe pain. If you do get severe pain, stop what you are doing. If severe  "pain continues, call your doctor or nurse.   Do not hold your breath when exercising. If you tend to hold your breath, try counting out loud when exercising. If any exercise bothers you, stop right away.   Always warm up before exercising. Warm muscles stretch much easier than cool muscles. Stretching cool muscles can lead to injury.   Doing exercises before a meal can be a good way to get into a routine.  All topics are updated as new evidence becomes available and our peer review process is complete.  This topic retrieved from [employer] on: Apr 03, 2024.  Topic 422146 Version 2.0  Release: 32.2.4 - C32.92  © 2024 UpToDate, Inc. and/or its affiliates. All rights reserved.  figure 1: Cat-cow stretch     Start on all fours on the floor, with hands under your shoulders, knees under your hips, and your back flat. First, tuck your chin and tighten your stomach muscles as you round your back (like a \"cat\"). Hold the stretch for about 10 seconds. Rest for a few seconds as you flatten your back. Next, lift your chin and let your belly and lower back sink toward the floor (like a \"cow\"). Hold the stretch for about 10 seconds.  Graphic [id number] Version 1.0  figure 2: Single knee-to-chest stretch     Lie on your back, bend your knees, and have your feet flat on the floor. Pull 1 knee toward your chest until you feel a stretch in your lower back and buttock area. Repeat with the other knee. If you have knee problems, pull your knee up by grabbing the back of your thigh instead of the front of your knee. You can also do this exercise by grabbing both knees at the same time.  Graphic [id number] Version 1.0  figure 3: Lower trunk rotation     While lying on your back, bend your knees and have your feet flat on the floor. Keep your legs together and then drop them to 1 side. Keep both of your shoulders touching the floor until you feel a stretch in the muscles at the side of the back. Repeat on the other side.  Graphic [id number] Version " 1.0  figure 4: Lower back stretch     Sit in a chair with your feet spread about shoulder width apart. Then, lean forward until you feel a stretch in your lower back.  Graphic [id number] Version 1.0  figure 5: Shoulder blade squeezes     Pinch your shoulder blades together on your upper back and hold for 3 to 5 seconds. Make sure that you are sitting with good posture and that your shoulders do not raise up when you do this exercise. Relax.  Graphic [id number] Version 1.0  figure 6: Pelvic tilts     Lie on your back with your knees bent and feet flat on the floor. Tighten your stomach muscles and press your lower back down to the floor. Relax.  Graphic [id number] Version 1.0  figure 7: Hip lifts     Lie on your back with your knees bent and feet flat on the floor. Tighten your stomach muscles and lift your buttocks off of the floor. Relax.  Graphic [id number] Version 1.0  Consumer Information Use and Disclaimer   Disclaimer: This generalized information is a limited summary of diagnosis, treatment, and/or medication information. It is not meant to be comprehensive and should be used as a tool to help the user understand and/or assess potential diagnostic and treatment options. It does NOT include all information about conditions, treatments, medications, side effects, or risks that may apply to a specific patient. It is not intended to be medical advice or a substitute for the medical advice, diagnosis, or treatment of a health care provider based on the health care provider's examination and assessment of a patient's specific and unique circumstances. Patients must speak with a health care provider for complete information about their health, medical questions, and treatment options, including any risks or benefits regarding use of medications. This information does not endorse any treatments or medications as safe, effective, or approved for treating a specific patient. UpToDate, Inc. and its affiliates disclaim any warranty or  liability relating to this information or the use thereof.The use of this information is governed by the Terms of Use, available at https://www.wolterskluwer.com/en/know/clinical-effectiveness-terms. 2024© UpToDate, Inc. and its affiliates and/or licensors. All rights reserved.  Copyright   © 2024 [employer] and/or its affiliates. All rights reserved.

## 2025-06-23 NOTE — ASSESSMENT & PLAN NOTE
Orders:    cyclobenzaprine (FLEXERIL) 10 mg tablet; Take 1 tablet (10 mg total) by mouth 3 (three) times a day as needed for muscle spasms    MRI lumbar spine wo contrast; Future    Greater than 90% relief of pain with improved ability to participate with IADLs after bilateral L3, L4, L5 medial branch nerve RFA thus far. Patient has completed formal PT with modest benefit, is compliant with formal PT and notes at least 30% relief of pain and improved function taking meloxicam and occasionally flexeril for acute spasms without side effects. Previously reported the following symptomatology:

## 2025-06-23 NOTE — PROGRESS NOTES
Name: Felix Grajeda      : 1970      MRN: [id number]  Encounter Provider: Fermín Lares MD  Encounter Date: 2025   Encounter department: [facility]  :  Assessment & Plan  Other spondylosis with radiculopathy, lumbar region    Orders:    cyclobenzaprine (FLEXERIL) 10 mg tablet; Take 1 tablet (10 mg total) by mouth 3 (three) times a day as needed for muscle spasms    MRI lumbar spine wo contrast; Future    Greater than 90% relief of pain with improved ability to participate with IADLs after bilateral L3, L4, L5 medial branch nerve RFA thus far. Patient has completed formal PT with modest benefit, is compliant with formal PT and notes at least 30% relief of pain and improved function taking meloxicam and occasionally flexeril for acute spasms without side effects. Previously reported the following symptomatology:     -MRI lumbar spine; will f/u result  -flexeril 10 mg t.i.d. prn spasm ordered for patient; counseled regarding sedative effects of taking this medication and provided up titration calendar.  Counseled not to take medication while driving or operating heavy machinery/using stairs  -Meloxicam 7.5 mg b.i.d. prn pain prescribed.  Patient educated regarding bleeding risk of taking this medication as well as not taking any other nonsteroidal anti-inflammatory medications while taking this medication; counseled thoroughly regarding potential risk of Cardiovascular injury, Kidney injury, Gastrointestinal ulceration/bleeding. Patient voiced understanding  -continue home physical therapy for lumbar radiculopathy; Physician directed home exercise plan as per [employer] demonstrated and handouts provided that patient plans to participate with for 1 hour, twice a week for the next 6 weeks.     Complete risks and benefits including bleeding, infection, tissue reaction, nerve injury and allergic reaction were discussed. The approach was demonstrated using models and  literature was provided. Verbal and written consent was obtained.    My impressions and treatment recommendations were discussed in detail with the patient who verbalized understanding and had no further questions.  Discharge instructions were provided. I personally saw and examined the patient and I agree with the above discussed plan of care.    History of Present Illness     Felix Grajeda is a 54 y.o. male who presents for a follow up office visit in regards to Follow-up. The patient’s current symptoms include axial back pain described primarily arthritic features. He describes 8/10 right low back pain that is worse in the mornings and worse at the end of the day.  The pain is characterized by achy, nagging, indolent, crampy, stabbing pain in his axial right low back in the region of the sacroiliac joint.  The patient describes that the pain is worse with standing for long periods of time on hard surfaces as well as with walking.  The patient is a very active individual and feels as though this pain compromises his  participation with independent activities of daily living. The pain can be debilitating at times and contribute to significant disability, compromising overall activity and independent activities of daily living. He has tried chiropractic and physical therapy with limited relief of symptoms.  Medications the patient has tried in the past include ibuprofen with limited relief. He describes   radicular symptoms in the bilateral L4 and L5 dermatomes but otherwise has good strength.  He endorses weakness numbness or paresthesias. The patient denies any bowel or bladder dysfunction as well.      Review of Systems   Constitutional:  Positive for activity change.   HENT: Negative.     Eyes: Negative.    Respiratory: Negative.     Cardiovascular: Negative.    Gastrointestinal: Negative.    Endocrine: Negative.    Genitourinary: Negative.    Musculoskeletal:  Positive for arthralgias, back pain, gait problem and  "myalgias.   Skin: Negative.    Allergic/Immunologic: Negative.    Neurological:  Positive for weakness and numbness.   Hematological: Negative.    Psychiatric/Behavioral: Negative.     All other systems reviewed and are negative.      Medical History Reviewed by provider this encounter:  Tobacco  Allergies  Meds  Problems  Med Hx  Surg Hx  Fam Hx     .    Objective   Ht 5' 11\" (1.803 m)   Wt 91 kg (200 lb 9.6 oz)   BMI 27.98 kg/m²         Physical Exam  Constitutional: normal, well developed, well nourished, alert, in no distress and non-toxic and no overt pain behavior. and overweight  Eyes: anicteric  HEENT: grossly intact  Neck: supple, symmetric, trachea midline and no masses   Pulmonary:even and unlabored  Cardiovascular:No edema or pitting edema present  Skin:Normal without rashes or lesions and well hydrated  Psychiatric:Mood and affect appropriate  Neurologic:Cranial Nerves II-XII grossly intact Sensation grossly intact; no clonus negative rodas's. Reflexes 2+ and brisk. SLR negative bilaterally.  Musculoskeletal:normal gait. 5/5 strength in all extremities with active range of motion movements bilaterally. Normal heel toe and tip toe walking.  pain with positive facet loading maneuvers bilaterally and with lateral spine rotation. No ttp over lumbar paraspinal muscles. Positive right-sided sacroiliac joint loading, positive Rony fingers test, positive Gaenslen's test.    Radiology Results Review: I personally reviewed the following image studies in PACS and associated radiology reports: MRI spine. My interpretation of the radiology images/reports is: Spondylotic degenerative changes are seen throughout the lumbar spine most prominently noted at L4-L5 level.    Administrative Statements   I have spent a total time of 30 minutes in caring for this patient on the day of the visit/encounter including Diagnostic results, Prognosis, Risks and benefits of tx options, Instructions for management, " Patient and family education, Importance of tx compliance, Risk factor reductions, Impressions, Counseling / Coordination of care, Documenting in the medical record, Reviewing/placing orders in the medical record (including tests, medications, and/or procedures), Obtaining or reviewing history  , and Communicating with other healthcare professionals .

## 2025-07-03 ENCOUNTER — TELEPHONE (OUTPATIENT)
Dept: RADIOLOGY | Facility: CLINIC | Age: 55
End: 2025-07-03

## 2025-07-03 DIAGNOSIS — M47.26 OTHER SPONDYLOSIS WITH RADICULOPATHY, LUMBAR REGION: Primary | ICD-10-CM

## 2025-07-03 NOTE — TELEPHONE ENCOUNTER
[facility]  The prior authorization request for this study has not been approved. You can schedule a peer to peer by calling [facility] [phone] Tracking# [id number]  with the deadline date of Scheduled on 7/11. The insurance determined the following:    [ ] Does not meet Medical Necessity  [ ] No prior imaging  [x ] PT or conservative treatment incomplete  [ ] Missing Labs  [ ] Frequency    Clinicals needed: Dates and duration of treatment (physical therapy, chiropractic, osteopathic manipulative treatment or home exercise program) showing 6 weeks worth within the past 6 months.      If you choose not to complete a peer to peer then please reply to this message to let us know. Please notify your patient and contact central scheduling by calling [phone] to cancel the appointment and cancel the order in Epic.

## 2025-07-03 NOTE — TELEPHONE ENCOUNTER
Caller: Felix    Doctor/Office: Dr Lares    Call regarding :  MRI     Call was transferred to: nurse

## 2025-07-03 NOTE — TELEPHONE ENCOUNTER
S/W pt and advised the same  Advised PT referral in [facility]  Pt would like PT contact info for [facility] sent via [email or web address]

## 2025-07-03 NOTE — TELEPHONE ENCOUNTER
Attempted to contact pt   Mailbox is not set up yet unable to leave message    Need PT Rx put in to EPIC

## (undated) DEVICE — DRAPE TOWEL: Brand: CONVERTORS

## (undated) DEVICE — GLOVE SRG LF STRL BGL SKNSNS 7.5 PF

## (undated) DEVICE — GAUZE SPONGES,USP TYPE VII GAUZE, 12 PLY: Brand: CURITY

## (undated) DEVICE — DRAPE SHEET THREE QUARTER

## (undated) DEVICE — IV EXTENSION TUBING SMALL BORE

## (undated) DEVICE — SKIN MARKER DUAL TIP WITH RULER CAP, FLEXIBLE RULER AND LABELS: Brand: DEVON

## (undated) DEVICE — SYRINGE 10ML LL

## (undated) DEVICE — UTILITY MARKER,BLACK WITH LABELS: Brand: DEVON

## (undated) DEVICE — NEEDLE 25G X 1 1/2

## (undated) DEVICE — NEEDLE 18 G X 1 1/2 SAFETY

## (undated) DEVICE — PLASTIC ADHESIVE BANDAGE: Brand: CURITY

## (undated) DEVICE — PAD GROUNDING IONIC RF DISP

## (undated) DEVICE — NEEDLE BLUNT 18 G X 1 1/2IN

## (undated) DEVICE — GAUZE SPONGES,16 PLY: Brand: CURITY

## (undated) DEVICE — NEEDLE SPINAL 22G X 3.5IN  QUINCKE

## (undated) DEVICE — TELFA ADHESIVE ISLAND DRESSING: Brand: TELFA

## (undated) DEVICE — SYRINGE 5ML LL

## (undated) DEVICE — Device

## (undated) DEVICE — ELECTRODE RF 10CM

## (undated) DEVICE — CHLORAPREP HI-LITE 10.5ML ORANGE

## (undated) DEVICE — TRAY EPID CONT PERIFIX 18G X 3.5IN 10ML CLSD TIP

## (undated) DEVICE — FLEXIBLE ADHESIVE BANDAGE,X-LARGE: Brand: CURITY

## (undated) DEVICE — SYRINGE 3ML LL

## (undated) DEVICE — GLOVE INDICATOR PI UNDERGLOVE SZ 7.5 BLUE

## (undated) DEVICE — SYRINGE LOR 8ML PLASTIC LL